# Patient Record
Sex: MALE | Race: WHITE | NOT HISPANIC OR LATINO | Employment: FULL TIME | URBAN - METROPOLITAN AREA
[De-identification: names, ages, dates, MRNs, and addresses within clinical notes are randomized per-mention and may not be internally consistent; named-entity substitution may affect disease eponyms.]

---

## 2022-08-17 ENCOUNTER — TELEPHONE (OUTPATIENT)
Dept: NEUROLOGY | Facility: HOSPITAL | Age: 72
End: 2022-08-17
Payer: COMMERCIAL

## 2022-08-17 DIAGNOSIS — D50.0 ANEMIA DUE TO CHRONIC BLOOD LOSS: Primary | ICD-10-CM

## 2022-08-17 NOTE — TELEPHONE ENCOUNTER
----- Message from Michael Silveira MD sent at 8/17/2022 11:28 AM CDT -----  Please schedule for upper DBE tomorrow. I know it's too many!

## 2022-08-17 NOTE — TELEPHONE ENCOUNTER
Attempt to give pt arrival time and instructions for upper sbe scheduled on Thursday, August 18, 2022.  Pt request to be call again in 30-45 minutes.     You are scheduled for an Upper SBE on ___Thursday, August 18, 2022 at [facility] at [address] [place] [address]  700_____________________________    You should eat light meals the day before the procedure and nothing to eat or drink after midnight the night before your procedure.    You will need to be at the 1st floor admission desk at the hospital on ___130pm arrival ____________________

## 2022-08-17 NOTE — TELEPHONE ENCOUNTER
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 Surgery is being considered by Dr. Ruiz but upper DBE is requested to evaluate for additional tumors of the small bowel prior to surgery.     Plan:   Upper DBE tomorrow with general anesthesia   Inspect stomach for history of "gastritis" which could be related to therapy for renal cell cancer     Michael Silveira MD  "

## 2022-08-18 ENCOUNTER — ANESTHESIA EVENT (OUTPATIENT)
Dept: ENDOSCOPY | Facility: HOSPITAL | Age: 72
End: 2022-08-18
Payer: COMMERCIAL

## 2022-08-18 ENCOUNTER — ANESTHESIA (OUTPATIENT)
Dept: ENDOSCOPY | Facility: HOSPITAL | Age: 72
End: 2022-08-18
Payer: COMMERCIAL

## 2022-08-18 ENCOUNTER — TELEPHONE (OUTPATIENT)
Dept: NEUROLOGY | Facility: HOSPITAL | Age: 72
End: 2022-08-18
Payer: COMMERCIAL

## 2022-08-18 ENCOUNTER — HOSPITAL ENCOUNTER (OUTPATIENT)
Facility: HOSPITAL | Age: 72
Discharge: HOME | End: 2022-08-18
Attending: INTERNAL MEDICINE | Admitting: INTERNAL MEDICINE
Payer: COMMERCIAL

## 2022-08-18 VITALS
SYSTOLIC BLOOD PRESSURE: 128 MMHG | BODY MASS INDEX: 32.32 KG/M2 | OXYGEN SATURATION: 100 % | DIASTOLIC BLOOD PRESSURE: 72 MMHG | WEIGHT: 194 LBS | RESPIRATION RATE: 14 BRPM | HEIGHT: 65 IN | HEART RATE: 66 BPM | TEMPERATURE: 98 F

## 2022-08-18 DIAGNOSIS — D64.9 ANEMIA: ICD-10-CM

## 2022-08-18 DIAGNOSIS — K92.2 GI BLEED: ICD-10-CM

## 2022-08-18 DIAGNOSIS — D50.0 ANEMIA DUE TO BLOOD LOSS, CHRONIC: ICD-10-CM

## 2022-08-18 PROCEDURE — 88341 IMHCHEM/IMCYTCHM EA ADD ANTB: CPT | Mod: 26,,, | Performed by: PATHOLOGY

## 2022-08-18 PROCEDURE — 63600175 PHARM REV CODE 636 W HCPCS: Performed by: ANESTHESIOLOGY

## 2022-08-18 PROCEDURE — 88305 TISSUE EXAM BY PATHOLOGIST: CPT | Mod: 26,,, | Performed by: PATHOLOGY

## 2022-08-18 PROCEDURE — 88342 IMHCHEM/IMCYTCHM 1ST ANTB: CPT | Mod: 59 | Performed by: PATHOLOGY

## 2022-08-18 PROCEDURE — 27201089 HC SNARE, DISP (ANY): Performed by: INTERNAL MEDICINE

## 2022-08-18 PROCEDURE — 88342 CHG IMMUNOCYTOCHEMISTRY: ICD-10-PCS | Mod: 26,,, | Performed by: PATHOLOGY

## 2022-08-18 PROCEDURE — 37000009 HC ANESTHESIA EA ADD 15 MINS: Performed by: INTERNAL MEDICINE

## 2022-08-18 PROCEDURE — 63600175 PHARM REV CODE 636 W HCPCS: Performed by: INTERNAL MEDICINE

## 2022-08-18 PROCEDURE — 25000003 PHARM REV CODE 250: Performed by: NURSE ANESTHETIST, CERTIFIED REGISTERED

## 2022-08-18 PROCEDURE — 25000003 PHARM REV CODE 250: Performed by: INTERNAL MEDICINE

## 2022-08-18 PROCEDURE — 44799 UNLISTED PX SMALL INTESTINE: CPT | Performed by: INTERNAL MEDICINE

## 2022-08-18 PROCEDURE — 63600175 PHARM REV CODE 636 W HCPCS: Performed by: NURSE ANESTHETIST, CERTIFIED REGISTERED

## 2022-08-18 PROCEDURE — 44364 SMALL BOWEL ENDOSCOPY: CPT | Mod: 59 | Performed by: INTERNAL MEDICINE

## 2022-08-18 PROCEDURE — 27201028 HC NEEDLE, SCLERO: Performed by: INTERNAL MEDICINE

## 2022-08-18 PROCEDURE — 44377 SMALL BOWEL ENDOSCOPY/BIOPSY: CPT | Performed by: INTERNAL MEDICINE

## 2022-08-18 PROCEDURE — 88305 TISSUE EXAM BY PATHOLOGIST: CPT | Mod: 59 | Performed by: PATHOLOGY

## 2022-08-18 PROCEDURE — 44366 SMALL BOWEL ENDOSCOPY: CPT | Performed by: INTERNAL MEDICINE

## 2022-08-18 PROCEDURE — 88341 PR IHC OR ICC EACH ADD'L SINGLE ANTIBODY  STAINPR: ICD-10-PCS | Mod: 26,,, | Performed by: PATHOLOGY

## 2022-08-18 PROCEDURE — 88342 IMHCHEM/IMCYTCHM 1ST ANTB: CPT | Mod: 26,,, | Performed by: PATHOLOGY

## 2022-08-18 PROCEDURE — 27201238 HC BALLOON, OVERTUBE (ANY): Performed by: INTERNAL MEDICINE

## 2022-08-18 PROCEDURE — 37000008 HC ANESTHESIA 1ST 15 MINUTES: Performed by: INTERNAL MEDICINE

## 2022-08-18 PROCEDURE — 88341 IMHCHEM/IMCYTCHM EA ADD ANTB: CPT | Performed by: PATHOLOGY

## 2022-08-18 PROCEDURE — 88305 TISSUE EXAM BY PATHOLOGIST: ICD-10-PCS | Mod: 26,,, | Performed by: PATHOLOGY

## 2022-08-18 PROCEDURE — 27200997: Performed by: INTERNAL MEDICINE

## 2022-08-18 RX ORDER — EPINEPHRINE 0.1 MG/ML
INJECTION INTRAVENOUS
Status: COMPLETED | OUTPATIENT
Start: 2022-08-18 | End: 2022-08-18

## 2022-08-18 RX ORDER — SUCCINYLCHOLINE CHLORIDE 20 MG/ML
INJECTION INTRAMUSCULAR; INTRAVENOUS
Status: DISCONTINUED | OUTPATIENT
Start: 2022-08-18 | End: 2022-08-18

## 2022-08-18 RX ORDER — SODIUM CHLORIDE 9 MG/ML
INJECTION, SOLUTION INTRAVENOUS CONTINUOUS
Status: DISCONTINUED | OUTPATIENT
Start: 2022-08-18 | End: 2022-08-18 | Stop reason: HOSPADM

## 2022-08-18 RX ORDER — ONDANSETRON 2 MG/ML
4 INJECTION INTRAMUSCULAR; INTRAVENOUS DAILY PRN
Status: DISCONTINUED | OUTPATIENT
Start: 2022-08-18 | End: 2022-08-18 | Stop reason: HOSPADM

## 2022-08-18 RX ORDER — HYDROMORPHONE HYDROCHLORIDE 2 MG/ML
0.5 INJECTION, SOLUTION INTRAMUSCULAR; INTRAVENOUS; SUBCUTANEOUS EVERY 5 MIN PRN
Status: DISCONTINUED | OUTPATIENT
Start: 2022-08-18 | End: 2022-08-18 | Stop reason: HOSPADM

## 2022-08-18 RX ORDER — LIDOCAINE HCL/PF 100 MG/5ML
SYRINGE (ML) INTRAVENOUS
Status: DISCONTINUED | OUTPATIENT
Start: 2022-08-18 | End: 2022-08-18

## 2022-08-18 RX ORDER — PHENYLEPHRINE HYDROCHLORIDE 10 MG/ML
INJECTION INTRAVENOUS
Status: DISCONTINUED | OUTPATIENT
Start: 2022-08-18 | End: 2022-08-18

## 2022-08-18 RX ORDER — FENTANYL CITRATE 50 UG/ML
INJECTION, SOLUTION INTRAMUSCULAR; INTRAVENOUS
Status: DISCONTINUED | OUTPATIENT
Start: 2022-08-18 | End: 2022-08-18

## 2022-08-18 RX ORDER — PROPOFOL 10 MG/ML
VIAL (ML) INTRAVENOUS
Status: DISCONTINUED | OUTPATIENT
Start: 2022-08-18 | End: 2022-08-18

## 2022-08-18 RX ORDER — ONDANSETRON 2 MG/ML
INJECTION INTRAMUSCULAR; INTRAVENOUS
Status: DISCONTINUED | OUTPATIENT
Start: 2022-08-18 | End: 2022-08-18

## 2022-08-18 RX ORDER — DEXTROMETHORPHAN/PSEUDOEPHED 2.5-7.5/.8
DROPS ORAL
Status: COMPLETED | OUTPATIENT
Start: 2022-08-18 | End: 2022-08-18

## 2022-08-18 RX ORDER — SODIUM CHLORIDE 0.9 % (FLUSH) 0.9 %
10 SYRINGE (ML) INJECTION
Status: DISCONTINUED | OUTPATIENT
Start: 2022-08-18 | End: 2022-08-18 | Stop reason: HOSPADM

## 2022-08-18 RX ORDER — DIPHENHYDRAMINE HYDROCHLORIDE 50 MG/ML
INJECTION INTRAMUSCULAR; INTRAVENOUS
Status: DISCONTINUED | OUTPATIENT
Start: 2022-08-18 | End: 2022-08-18

## 2022-08-18 RX ORDER — GLUCAGON 1 MG
KIT INJECTION
Status: DISCONTINUED | OUTPATIENT
Start: 2022-08-18 | End: 2022-08-18

## 2022-08-18 RX ORDER — ROCURONIUM BROMIDE 10 MG/ML
INJECTION, SOLUTION INTRAVENOUS
Status: DISCONTINUED | OUTPATIENT
Start: 2022-08-18 | End: 2022-08-18

## 2022-08-18 RX ORDER — SODIUM CHLORIDE 0.9 % (FLUSH) 0.9 %
3 SYRINGE (ML) INJECTION
Status: DISCONTINUED | OUTPATIENT
Start: 2022-08-18 | End: 2022-08-18 | Stop reason: HOSPADM

## 2022-08-18 RX ORDER — DIPHENHYDRAMINE HYDROCHLORIDE 50 MG/ML
12.5 INJECTION INTRAMUSCULAR; INTRAVENOUS EVERY 6 HOURS PRN
Status: DISCONTINUED | OUTPATIENT
Start: 2022-08-18 | End: 2022-08-18 | Stop reason: HOSPADM

## 2022-08-18 RX ADMIN — LIDOCAINE HYDROCHLORIDE 20 MG: 20 INJECTION, SOLUTION INTRAVENOUS at 01:08

## 2022-08-18 RX ADMIN — PROPOFOL 200 MG: 10 INJECTION, EMULSION INTRAVENOUS at 01:08

## 2022-08-18 RX ADMIN — ONDANSETRON 4 MG: 2 INJECTION INTRAMUSCULAR; INTRAVENOUS at 03:08

## 2022-08-18 RX ADMIN — ROCURONIUM BROMIDE 20 MG: 10 INJECTION, SOLUTION INTRAVENOUS at 01:08

## 2022-08-18 RX ADMIN — SUCCINYLCHOLINE CHLORIDE 140 MG: 20 INJECTION, SOLUTION INTRAMUSCULAR; INTRAVENOUS at 01:08

## 2022-08-18 RX ADMIN — ONDANSETRON 8 MG: 2 INJECTION, SOLUTION INTRAMUSCULAR; INTRAVENOUS at 01:08

## 2022-08-18 RX ADMIN — SUGAMMADEX 200 MG: 100 INJECTION, SOLUTION INTRAVENOUS at 02:08

## 2022-08-18 RX ADMIN — SODIUM CHLORIDE, SODIUM LACTATE, POTASSIUM CHLORIDE, AND CALCIUM CHLORIDE: .6; .31; .03; .02 INJECTION, SOLUTION INTRAVENOUS at 02:08

## 2022-08-18 RX ADMIN — SIMETHICONE 40 MG: 20 SUSPENSION/ DROPS ORAL at 01:08

## 2022-08-18 RX ADMIN — GLUCAGON HYDROCHLORIDE 0.5 MG: KIT at 02:08

## 2022-08-18 RX ADMIN — EPINEPHRINE 1 MG: 0.1 INJECTION, SOLUTION ENDOTRACHEAL; INTRACARDIAC; INTRAVENOUS at 02:08

## 2022-08-18 RX ADMIN — PHENYLEPHRINE HYDROCHLORIDE 100 MCG: 10 INJECTION INTRAVENOUS at 01:08

## 2022-08-18 RX ADMIN — DIPHENHYDRAMINE HYDROCHLORIDE 12.5 MG: 50 INJECTION INTRAMUSCULAR; INTRAVENOUS at 01:08

## 2022-08-18 RX ADMIN — ROCURONIUM BROMIDE 20 MG: 10 INJECTION, SOLUTION INTRAVENOUS at 02:08

## 2022-08-18 RX ADMIN — FENTANYL CITRATE 50 MCG: 50 INJECTION, SOLUTION INTRAMUSCULAR; INTRAVENOUS at 01:08

## 2022-08-18 RX ADMIN — ROCURONIUM BROMIDE 10 MG: 10 INJECTION, SOLUTION INTRAVENOUS at 01:08

## 2022-08-18 RX ADMIN — SODIUM CHLORIDE: 0.9 INJECTION, SOLUTION INTRAVENOUS at 12:08

## 2022-08-18 NOTE — ANESTHESIA PREPROCEDURE EVALUATION
08/18/2022  Mark Coppola is a 71 y.o., male.      Pre-op Assessment    I have reviewed the Patient Summary Reports.     I have reviewed the Nursing Notes. I have reviewed the NPO Status.   I have reviewed the Medications.     Review of Systems  Anesthesia Hx:  No problems with previous Anesthesia    Cardiovascular:   Hypertension    Pulmonary:  Pulmonary Normal    Endocrine:   Diabetes Hypothyroidism        Physical Exam  General: Well nourished, Cooperative, Alert and Oriented    Airway:  Mallampati: II   Mouth Opening: Normal  TM Distance: Normal  Tongue: Normal    Chest/Lungs:  Clear to auscultation, Normal Respiratory Rate        Anesthesia Plan  Type of Anesthesia, risks & benefits discussed:    Anesthesia Type: MAC  Intra-op Monitoring Plan: Standard ASA Monitors  Post Op Pain Control Plan: multimodal analgesia  Induction:  IV  Informed Consent: Informed consent signed with the Patient and all parties understand the risks and agree with anesthesia plan.  All questions answered.   ASA Score: 3    Ready For Surgery From Anesthesia Perspective.     .

## 2022-08-18 NOTE — OR NURSING
Discharge instructions reviewed with patient and spouse. Copy of procedural results given to patient.   Patient sitting up side of bed at his request  Prior to getting dress       Dr Silveira notifiy patient phone number

## 2022-08-18 NOTE — PLAN OF CARE
Patient has met PACU discharge criteria, VSS, pain well controlled. Family updated by phone. Released from PACU by Dr. Mendez

## 2022-08-18 NOTE — H&P
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 Surgery is being considered by Dr. Ruiz but upper DBE is requested to evaluate for additional tumors of the small bowel prior to surgery.     Past Medical History  Renal cell CA - advanced on diagnosis in 2016; initially treated with right nephrectomy; complicated by metastatic disease to the perineum and now duodenum   History of FE   Hypertension   Gout   History of prostate CA s/p prostatectomy   History of Hashimoto's thyroiditis s/p thyroidectomy      Physical Examination  /71 (BP Location: Left arm, Patient Position: Lying)   Pulse 76   Temp 98.4 °F (36.9 °C) (Tympanic)   Resp 16   Ht 5' 5" (1.651 m)   Wt 88 kg (194 lb)   SpO2 98%   BMI 32.28 kg/m²     General appearance: alert, cooperative, no distress  HENT: Normocephalic, atraumatic, neck symmetrical, no nasal discharge   Lungs: clear to auscultation bilaterally, no dullness to percussion bilaterally  Heart: regular rate and rhythm without rub; no displacement of the PMI   Abdomen: soft, non-tender; bowel sounds normoactive; no organomegaly    Assessment:   71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 Surgery is being considered by Dr. Ruiz but upper DBE is requested to evaluate for additional tumors of the small bowel prior to surgery as well as potential attempt at tumor resection if feasible.     Plan:  Upper DBE with general anesthesia  Potential attempt at endoscopic resection or debulking of the duodenal tumor, especially if there are no additional tumors found in the deep small bowel. Risks/benefits of endoscopic intervention discussed with patient and wife in detail including " risks of perforation and bleeding which are much higher than standard endoscopy. As major surgery such as Whipple is the other option for therapy, patient wishes to plan for endoscopic resection if feasible.     Michael Silveira MD   [address] [facility] [facility], [address]   [place] [address] [phone]

## 2022-08-18 NOTE — PLAN OF CARE
Recovery time completed.IV discontinued at this time.All questions encouraged and answered at this time.

## 2022-08-18 NOTE — TELEPHONE ENCOUNTER
Pt given 1230pm arrival today, [facility] at [address], [place]. [address].  Pt states he is npo.

## 2022-08-18 NOTE — ANESTHESIA POSTPROCEDURE EVALUATION
Anesthesia Post Evaluation    Patient: Mark Coppola    Procedure(s) Performed: Procedure(s) (LRB):  Upper DBE with general anesthesia (N/A)    Final Anesthesia Type: MAC      Patient location during evaluation: PACU  Patient participation: Yes- Able to Participate  Level of consciousness: awake and alert, oriented and awake  Post-procedure vital signs: reviewed and stable  Pain management: adequate  Airway patency: patent    PONV status at discharge: No PONV  Anesthetic complications: no      Cardiovascular status: blood pressure returned to baseline  Respiratory status: unassisted and room air  Hydration status: euvolemic  Follow-up not needed.          Vitals Value Taken Time   /72 08/18/22 1610   Temp 36.8 °C (98.2 °F) 08/18/22 1543   Pulse 66 08/18/22 1610   Resp 14 08/18/22 1610   SpO2 100 % 08/18/22 1610         Event Time   Out of Recovery 16:29:47         Pain/Yg Score: Yg Score: 10 (8/18/2022  4:10 PM)

## 2022-08-18 NOTE — PLAN OF CARE
Admission process complete. Patient ready for procedure. Plan of care reviewed with patient. VSS. NPO status maintained. Call light in reach. Bed locked and in lowest position.      DC instructions

## 2022-08-18 NOTE — TRANSFER OF CARE
"Anesthesia Transfer of Care Note    Patient: Mark Coppola    Procedure(s) Performed: Procedure(s) (LRB):  Upper DBE with general anesthesia (N/A)    Patient location: PACU    Anesthesia Type: general    Transport from OR: Transported from OR on 2-3 L/min O2 by NC with adequate spontaneous ventilation    Post pain: adequate analgesia    Post assessment: no apparent anesthetic complications and tolerated procedure well    Post vital signs: stable    Level of consciousness: sedated    Nausea/Vomiting: no nausea/vomiting    Complications: none    Transfer of care protocol was followed      Last vitals:   Visit Vitals  /71   Pulse 80   Temp 36.5 °C (97.7 °F) (Skin)   Resp 18   Ht 5' 5" (1.651 m)   Wt 88 kg (194 lb)   SpO2 100%   BMI 32.28 kg/m²     "

## 2022-08-18 NOTE — PLAN OF CARE
Pt procedure completed with no complications.V/S are stable. Pt denies any pain or discomfort at this time.Wife at the bedside.Voices no complaints or concerns.Will continue to monitor.

## 2022-08-18 NOTE — [employer]ATION PATIENT INSTRUCTIONS
Discharge Summary/Instructions after an Endoscopic Procedure  Patient Name: Mark Coppola  Patient MRN: [id number]  Patient YOB: 1950  Thursday, August 18, 2022  Michael Silveira MD  Dear patient,  As a result of recent federal legislation (The Federal Cures Act), you may   receive lab or pathology results from your procedure in your [facility]   account before your physician is able to contact you. Your physician or   their representative will relay the results to you with their   recommendations at their soonest availability.  Thank you,  Your health is very important to us during the Covid Crisis. Following your   procedure today, you will receive a daily text for 2 weeks asking about   signs or symptoms of Covid 19.  Please respond to this text when you   receive it so we can follow up and keep you as safe as possible.   RESTRICTIONS:  During your procedure today, you received medications for sedation.  These   medications may affect your judgment, balance and coordination.  Therefore,   for 24 hours, you have the following restrictions:   - DO NOT drive a car, operate machinery, make legal/financial decisions,   sign important papers or drink alcohol.    ACTIVITY:  Today: no heavy lifting, straining or running due to procedural   sedation/anesthesia.  The following day: return to full activity including work.  DIET:  Eat and drink normally unless instructed otherwise.     TREATMENT FOR COMMON SIDE EFFECTS:  - Mild abdominal pain, nausea, belching, bloating or excessive gas:  rest,   eat lightly and use a heating pad.  - Sore Throat: treat with throat lozenges and/or gargle with warm salt   water.  - Because air was used during the procedure, expelling large amounts of air   from your rectum or belching is normal.  - If a bowel prep was taken, you may not have a bowel movement for 1-3 days.    This is normal.  SYMPTOMS TO WATCH FOR AND REPORT TO YOUR PHYSICIAN:  1. Abdominal pain or bloating, other  than gas cramps.  2. Chest pain.  3. Back pain.  4. Signs of infection such as: chills or fever occurring within 24 hours   after the procedure.  5. Rectal bleeding, which would show as bright red, maroon, or black stools.   (A tablespoon of blood from the rectum is not serious, especially if   hemorrhoids are present.)  6. Vomiting.  7. Weakness or dizziness.  GO DIRECTLY TO THE NEAREST EMERGENCY ROOM IF YOU HAVE ANY OF THE FOLLOWING:      Difficulty breathing              Chills and/or fever over 101 F   Persistent vomiting and/or vomiting blood   Severe abdominal pain   Severe chest pain   Black, tarry stools   Bleeding- more than one tablespoon   Any other symptom or condition that you feel may need urgent attention  Your doctor recommends these additional instructions:  If any biopsies were taken, your doctors clinic will contact you in 1 to 2   weeks with any results.  - Discharge patient to home.   - Condition stable   - The signs and symptoms of potential delayed complications were discussed   with the patient. If signs or symptoms of these complications develop, call   the [facility] at [phone].   - Return to normal activities tomorrow.  Written discharge instructions were   provided to the patient.  For questions, problems or results please call your physician - Michael Silveira MD.  EMERGENCY PHONE NUMBER: [phone],  LAB RESULTS: [phone]  IF A COMPLICATION OR EMERGENCY SITUATION ARISES AND YOU ARE UNABLE TO REACH   YOUR PHYSICIAN - GO DIRECTLY TO THE EMERGENCY ROOM.  MD Michael Davis MD  8/18/2022 3:01:38 PM  This report has been verified and signed electronically.  Dear patient,  As a result of recent federal legislation (The Federal Cures Act), you may   receive lab or pathology results from your procedure in your [facility]   account before your physician is able to contact you. Your physician or   their representative will relay the results to you with  their   recommendations at their soonest availability.  Thank you,  PROVATION

## 2022-08-18 NOTE — ANESTHESIA PROCEDURE NOTES
Intubation    Date/Time: 8/18/2022 1:33 PM  Performed by: Candice Payne CRNA  Authorized by: Candice Payne CRNA     Intubation:     Induction:  Rapid sequence induction    Intubated:  Postinduction    Mask Ventilation:  Easy mask    Attempts:  1    Attempted By:  CRNA    Method of Intubation:  Video laryngoscopy    Blade:  Antonio 3    Laryngeal View Grade: Grade I - full view of cords      Difficult Airway Encountered?: No      Complications:  None    Airway Device:  Oral endotracheal tube    Airway Device Size:  7.5    Tube secured:  21    Secured at:  The lips    Placement Verified By:  Capnometry    Complicating Factors:  None    Findings Post-Intubation:  BS equal bilateral and atraumatic/condition of teeth unchanged

## 2022-08-19 LAB — POCT GLUCOSE: 111 MG/DL (ref 70–110)

## 2022-08-25 ENCOUNTER — TELEPHONE (OUTPATIENT)
Dept: NEUROLOGY | Facility: HOSPITAL | Age: 72
End: 2022-08-25
Payer: COMMERCIAL

## 2022-08-25 LAB
COMMENT: NORMAL
FINAL PATHOLOGIC DIAGNOSIS: NORMAL
GROSS: NORMAL
Lab: NORMAL
MICROSCOPIC EXAM: NORMAL

## 2022-08-25 NOTE — TELEPHONE ENCOUNTER
----- Message from Michael Silveira MD sent at 8/25/2022 11:35 AM CDT -----  Please fax this patient's procedure report to Dr. Ruiz at his new office.   Please schedule a clinic visit for Dr. Coppola to see me sometime in September just to review his results in detail and pictures and review plans for future management

## 2022-08-25 NOTE — TELEPHONE ENCOUNTER
Clinic appt schedule with pt on Wednesday, September 14, 2022 at 11am.  Pt given clinic address and repeated correctly.  Procedure report faxed to Dr. Ruiz at [phone].

## 2022-09-13 ENCOUNTER — TELEPHONE (OUTPATIENT)
Dept: NEUROLOGY | Facility: HOSPITAL | Age: 72
End: 2022-09-13
Payer: COMMERCIAL

## 2022-09-13 NOTE — TELEPHONE ENCOUNTER
----- Message from Geena Oscar sent at 9/13/2022  1:03 PM CDT -----  Contact: [phone]  Who Called: PT  Regarding: change appt time to later in the day   Would the patient rather a call back or a response via [employer]? Call back  Best Call Back Number: [phone]  Additional Information: n/a

## 2022-09-19 ENCOUNTER — OFFICE VISIT (OUTPATIENT)
Dept: NEUROLOGY | Facility: HOSPITAL | Age: 72
End: 2022-09-19
Attending: INTERNAL MEDICINE
Payer: COMMERCIAL

## 2022-09-19 VITALS
HEIGHT: 65 IN | DIASTOLIC BLOOD PRESSURE: 79 MMHG | TEMPERATURE: 98 F | HEART RATE: 62 BPM | SYSTOLIC BLOOD PRESSURE: 152 MMHG | WEIGHT: 194 LBS | BODY MASS INDEX: 32.32 KG/M2

## 2022-09-19 DIAGNOSIS — C78.4 SECONDARY MALIGNANCY OF DUODENUM: Primary | ICD-10-CM

## 2022-09-19 PROCEDURE — 99213 OFFICE O/P EST LOW 20 MIN: CPT | Performed by: INTERNAL MEDICINE

## 2022-09-19 RX ORDER — APREMILAST 30 MG/1
30 TABLET, FILM COATED ORAL 2 TIMES DAILY
Status: ON HOLD | COMMUNITY
End: 2023-11-17 | Stop reason: CLARIF

## 2022-09-19 NOTE — PROGRESS NOTES
"[facility]    CC: GI blood loss    HPI 71 y.o. male with a history of metastatic renal cell cancer presenting for follow up of DBE for evaluation of iron deficiency anemia secondary to chronic GI blood loss associated with recently discovered renal cell metastasis to the duodenum. 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 He denies any fatigue, shortness of breath, chest pain, or weakness. He denies any dark stool, melena, or hematochezia. He reports his last iron infusion was on 9/7/2022.    Upper DBE 8/18  - 10 mm polyp around ampulla  - 25 mm pedunculated polyp with actively bleeding in 2nd portion of duodenum  - patchy mucosa in jejunum     Past Medical History  Renal Cell Carcinoma (2016) s/p right nephrectomy now with metastasis to perineum and duodenum  FE  HTN  Gout  History of prostate cancer s/p porstatectomy  Hashimoto's Thyroiditis s/p Thyroidectomy      Physical Examination  BP (!) 152/79 (BP Location: Right arm, Patient Position: Sitting, BP Method: Large (Automatic))   Pulse 62   Temp 97.8 °F (36.6 °C) (Oral)   Ht 5' 5" (1.651 m)   Wt 88 kg (194 lb 0.1 oz)   BMI 32.28 kg/m²   General appearance: alert, cooperative, no distress  HENT: Normocephalic, atraumatic, neck symmetrical, no nasal discharge   Lungs: clear to auscultation bilaterally, no dullness to percussion bilaterally  Heart: regular rate and rhythm without rub; no displacement of the PMI   Abdomen: soft, non-tender; bowel sounds normoactive; no organomegaly  Extremities: extremities symmetric; no clubbing, cyanosis, or edema  Neurologic: Alert and conversant    Labs:  Hb = 9.4 I MCV = 87    Assessment:   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 This intervention has successfully improved his GI " blood loss. However, he has another metastatic lesion involving his ampulla which could be proactively removed by ampulectomy for debulking to prevent future complications such as bleeding     Plan:  Case referred to Dr. Yifan Partida with [facility] for ampulectomy for debulking of ampullary renal cell metastasis   Plan future sessions of debulking for recurrent renal cell tumor grown in the small bowel by DBE as needed     Michael Silveira MD   [address] [facility], [address]   [place] [address] [phone]

## 2023-04-06 DIAGNOSIS — R26.9 ABNORMALITY OF GAIT: ICD-10-CM

## 2023-04-06 DIAGNOSIS — I51.9 MYXEDEMA HEART DISEASE: ICD-10-CM

## 2023-04-06 DIAGNOSIS — I10 ESSENTIAL HYPERTENSION, MALIGNANT: ICD-10-CM

## 2023-04-06 DIAGNOSIS — E78.5 HYPERLIPEMIA: ICD-10-CM

## 2023-04-06 DIAGNOSIS — E03.9 MYXEDEMA HEART DISEASE: ICD-10-CM

## 2023-04-06 DIAGNOSIS — R53.83 FATIGUE: ICD-10-CM

## 2023-04-06 DIAGNOSIS — D64.9 ANEMIA, UNSPECIFIED: ICD-10-CM

## 2023-04-06 DIAGNOSIS — D51.9 VITAMIN B12 DEFICIENCY ANEMIA: ICD-10-CM

## 2023-10-02 ENCOUNTER — HOSPITAL ENCOUNTER (OUTPATIENT)
Dept: RADIATION THERAPY | Facility: HOSPITAL | Age: 73
Discharge: HOME | End: 2023-10-02
Attending: RADIOLOGY
Payer: COMMERCIAL

## 2023-10-05 ENCOUNTER — OFFICE VISIT (OUTPATIENT)
Dept: HEMATOLOGY/ONCOLOGY | Facility: CLINIC | Age: 73
End: 2023-10-05
Payer: COMMERCIAL

## 2023-10-05 VITALS
DIASTOLIC BLOOD PRESSURE: 65 MMHG | HEART RATE: 76 BPM | HEIGHT: 65 IN | BODY MASS INDEX: 32.28 KG/M2 | RESPIRATION RATE: 17 BRPM | SYSTOLIC BLOOD PRESSURE: 142 MMHG | TEMPERATURE: 97 F | OXYGEN SATURATION: 97 %

## 2023-10-05 DIAGNOSIS — C79.31 METASTATIC RENAL CELL CARCINOMA TO BRAIN: Primary | ICD-10-CM

## 2023-10-05 DIAGNOSIS — D64.9 SYMPTOMATIC ANEMIA: ICD-10-CM

## 2023-10-05 DIAGNOSIS — C79.31 METASTATIC CANCER TO BRAIN: Primary | ICD-10-CM

## 2023-10-05 DIAGNOSIS — C64.9 METASTATIC RENAL CELL CARCINOMA TO BRAIN: Primary | ICD-10-CM

## 2023-10-05 DIAGNOSIS — C79.31 METASTATIC RENAL CELL CARCINOMA TO BRAIN: ICD-10-CM

## 2023-10-05 DIAGNOSIS — C64.9 METASTATIC RENAL CELL CARCINOMA TO BRAIN: ICD-10-CM

## 2023-10-05 DIAGNOSIS — C64.9 METASTATIC RENAL CELL CARCINOMA, UNSPECIFIED LATERALITY: ICD-10-CM

## 2023-10-05 PROCEDURE — 3288F PR FALLS RISK ASSESSMENT DOCUMENTED: ICD-10-PCS | Mod: CPTII,S$GLB,, | Performed by: INTERNAL MEDICINE

## 2023-10-05 PROCEDURE — 99205 OFFICE O/P NEW HI 60 MIN: CPT | Mod: S$GLB,,, | Performed by: INTERNAL MEDICINE

## 2023-10-05 PROCEDURE — 1126F AMNT PAIN NOTED NONE PRSNT: CPT | Mod: CPTII,S$GLB,, | Performed by: INTERNAL MEDICINE

## 2023-10-05 PROCEDURE — 3078F PR MOST RECENT DIASTOLIC BLOOD PRESSURE < 80 MM HG: ICD-10-PCS | Mod: CPTII,S$GLB,, | Performed by: INTERNAL MEDICINE

## 2023-10-05 PROCEDURE — 99205 PR OFFICE/OUTPT VISIT, NEW, LEVL V, 60-74 MIN: ICD-10-PCS | Mod: S$GLB,,, | Performed by: INTERNAL MEDICINE

## 2023-10-05 PROCEDURE — 99999 PR PBB SHADOW E&M-EST. PATIENT-LVL V: CPT | Mod: PBBFAC,,, | Performed by: INTERNAL MEDICINE

## 2023-10-05 PROCEDURE — 3077F PR MOST RECENT SYSTOLIC BLOOD PRESSURE >= 140 MM HG: ICD-10-PCS | Mod: CPTII,S$GLB,, | Performed by: INTERNAL MEDICINE

## 2023-10-05 PROCEDURE — 1159F MED LIST DOCD IN RCRD: CPT | Mod: CPTII,S$GLB,, | Performed by: INTERNAL MEDICINE

## 2023-10-05 PROCEDURE — 1159F PR MEDICATION LIST DOCUMENTED IN MEDICAL RECORD: ICD-10-PCS | Mod: CPTII,S$GLB,, | Performed by: INTERNAL MEDICINE

## 2023-10-05 PROCEDURE — 1160F RVW MEDS BY RX/DR IN RCRD: CPT | Mod: CPTII,S$GLB,, | Performed by: INTERNAL MEDICINE

## 2023-10-05 PROCEDURE — 1126F PR PAIN SEVERITY QUANTIFIED, NO PAIN PRESENT: ICD-10-PCS | Mod: CPTII,S$GLB,, | Performed by: INTERNAL MEDICINE

## 2023-10-05 PROCEDURE — 3008F BODY MASS INDEX DOCD: CPT | Mod: CPTII,S$GLB,, | Performed by: INTERNAL MEDICINE

## 2023-10-05 PROCEDURE — 99999 PR PBB SHADOW E&M-EST. PATIENT-LVL V: ICD-10-PCS | Mod: PBBFAC,,, | Performed by: INTERNAL MEDICINE

## 2023-10-05 PROCEDURE — 3078F DIAST BP <80 MM HG: CPT | Mod: CPTII,S$GLB,, | Performed by: INTERNAL MEDICINE

## 2023-10-05 PROCEDURE — 1101F PR PT FALLS ASSESS DOC 0-1 FALLS W/OUT INJ PAST YR: ICD-10-PCS | Mod: CPTII,S$GLB,, | Performed by: INTERNAL MEDICINE

## 2023-10-05 PROCEDURE — 3008F PR BODY MASS INDEX (BMI) DOCUMENTED: ICD-10-PCS | Mod: CPTII,S$GLB,, | Performed by: INTERNAL MEDICINE

## 2023-10-05 PROCEDURE — 3288F FALL RISK ASSESSMENT DOCD: CPT | Mod: CPTII,S$GLB,, | Performed by: INTERNAL MEDICINE

## 2023-10-05 PROCEDURE — 3077F SYST BP >= 140 MM HG: CPT | Mod: CPTII,S$GLB,, | Performed by: INTERNAL MEDICINE

## 2023-10-05 PROCEDURE — 1160F PR REVIEW ALL MEDS BY PRESCRIBER/CLIN PHARMACIST DOCUMENTED: ICD-10-PCS | Mod: CPTII,S$GLB,, | Performed by: INTERNAL MEDICINE

## 2023-10-05 PROCEDURE — 1101F PT FALLS ASSESS-DOCD LE1/YR: CPT | Mod: CPTII,S$GLB,, | Performed by: INTERNAL MEDICINE

## 2023-10-05 NOTE — PROGRESS NOTES
Subjective     Patient ID: Mark Coppola is a 72 y.o. male.    Chief Complaint: No chief complaint on file.    HPI      - 7/1/2016 Left nephrectomy  Pathology:  Clear cell RCC, Grade 3, pT3, negative margins    - 10/2/2018 recurrence on surveillance imaging at right paratracheal LN  Tried Sutent x 1-2 months but discontinued with side effects    - end of 2018- 2/2020: Ipi/Nivo followed by Nivo single agent  Developed lichen planus and discontinued    - 3/19/2020 Bone scan negative  - 5/8/2020 Brain MRI negative  - 5/8/2020 CT C/A/P revealed a 1.5 cm LLL pleural nodule  - 8/19/2020 CT C/A/P revealed the 1.5 cm LLL pleural nodule and a 0.5 cm LLL noduke    - 2/26/2021 CT C/A/P: revealed increased increased pleural and pulmonary nodules  - 5/18/2021 CT C/A/P Sacroiliac lytic lesion, other areas stable    - XRT    - GI Bleed  Intervention 9/2022    PMH:  - Prostate Cancer  2002, attempted radical prostatectomy aborted but LND completed  Karval 8 prostate cancer  Completed proton beam XRT and ADT  Radiation proctitis s/p APC 2020    -HTN  - Type 2 DM  - Hyperlipidemia  - Hypothyroidism (Hashimoto's-> thyroidectomy)  - Neuropathy  - Diverticulosis  - Fe def anemia    Review of Systems       Objective     Physical Exam       Assessment and Plan     {There are no diagnoses linked to this encounter. (Refresh or delete this SmartLink)}    ***         No follow-ups on file.

## 2023-10-05 NOTE — PROGRESS NOTES
Subjective     Patient ID: Mark Coppola is a 72 y.o. male.    Chief Complaint: Prostate Cancer    HPI    Presents for medical oncology opinion:  Metastatic renal cell cancer  CARIS- [facility]  Currently off therapy- multiple transfusions with disease related bleed    Primary Medical Oncologist: Dr. Gael Corado  Primary Radiation Oncologist: Dr. Baylee Hinson    Reports the following:  - stereotactic XRT CNS approx 6 months ago and told most recent MRI - Friday MRI  Left cerebellar, right cerebellar, right frontal progression    6 weeks ago stopped working- rhinovirus  Got very ill    Chronic nausea  No headaches  Undergoing PT-- generalized weakness  LE swelling  Weight decreased over last 2-3 years -- eating better with steroid and now having blood glucose elevations (> 200)  Treating hypercalcemia    Prior consults at [facility] [place], [facility]    Oncology History:    [facility]  [place] like he would die climbing there and when he returned home and sought out work up  Developed hematuria and hydronephrosis    - 7/1/2016 Left nephrectomy at [facility]  Pathology:  Clear cell RCC, Grade 3, pT3, negative margins    - surveillance imaging    - 10/2/2018 recurrence on surveillance imaging at right paratracheal LN  Tried Sutent x 1-2 months but discontinued with side effects (hand foot symptoms)    - end of 2018- 2/2020: Ipi/Nivo followed by Nivo single agent  Plaquenil  Otezela  Developed lichen planus and discontinued    - 3/19/2020 Bone scan negative    - 5/8/2020 Brain MRI negative    - 5/8/2020 CT C/A/P revealed a 1.5 cm LLL pleural nodule    - 8/19/2020 CT C/A/P revealed the 1.5 cm LLL pleural nodule and a 0.5 cm LLL noduke    - 2/26/2021 CT C/A/P: revealed increased increased pleural and pulmonary nodules    - 5/18/2021 CT C/A/P Sacroiliac lytic lesion, other areas stable    - he reports on Keytruda at same point    - XRT to SI joint    6 weeks ago rhinovirus  -dexamethasone 4 mg bid  Xgeva (touro) to  reduce calcium Dr. Sotelo    Last Keytruda dose  Told to discontinue      - GI Bleed  Intervention 2022- cauterization   Intermittently requires transfusion - last transfusion was last Saturday  Can be 1 x per week  Recent scopes - Dr. 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 Negative for chills and fever.   HENT:  Negative for trouble swallowing.    Eyes:  Negative for visual disturbance.   Respiratory:  Negative for cough and shortness of breath.    Cardiovascular:  Positive for leg swelling. Negative for chest pain and palpitations.   Gastrointestinal:  Negative for abdominal distention, abdominal pain, change in bowel habit, constipation and nausea.   Genitourinary:  Negative for decreased urine volume, difficulty urinating and dysuria.   Musculoskeletal:  Negative for arthralgias.   Neurological:  Negative for dizziness, weakness, light-headedness and numbness.   Hematological:  Negative for adenopathy. Does not bruise/bleed easily.   Psychiatric/Behavioral:  Negative for dysphoric mood. The patient is not nervous/anxious.           Objective     Physical Exam  Constitutional:       General: He is not in acute distress.     Appearance: He is ill-appearing.      Comments: Presents with son  ECOG= 2   HENT:      Head: Normocephalic and atraumatic.   Eyes:      General: No scleral icterus.     Extraocular Movements: Extraocular movements intact.      Pupils: Pupils are  equal, round, and reactive to light.   Cardiovascular:      Rate and Rhythm: Normal rate and regular rhythm.      Heart sounds: Murmur heard.      No friction rub. No gallop.   Pulmonary:      Effort: Pulmonary effort is normal. No respiratory distress.      Breath sounds: Normal breath sounds. No wheezing, rhonchi or rales.   Abdominal:      General: Abdomen is flat. Bowel sounds are normal. There is no distension.      Palpations: Abdomen is soft. There is no mass.      Tenderness: There is no abdominal tenderness. There is no rebound.   Musculoskeletal:         General: No swelling. Normal range of motion.      Cervical back: Normal range of motion and neck supple.      Right lower leg: No edema.      Left lower leg: No edema.   Skin:     General: Skin is warm and dry.      Coloration: Skin is pale. Skin is not jaundiced.      Findings: No rash.   Neurological:      General: No focal deficit present.      Mental Status: He is alert and oriented to person, place, and time.      Sensory: No sensory deficit.      Motor: Weakness (generalized) present.      Gait: Gait abnormal.   Psychiatric:         Mood and Affect: Mood normal.         Behavior: Behavior normal.         Thought Content: Thought content normal.         Judgment: Judgment normal.     Outside available recrrds reviewed     Assessment and Plan     1. Metastatic cancer to brain  -     Ambulatory referral/consult to Radiation Oncology; Future; Expected date: 10/13/2023    2. Metastatic renal cell carcinoma, unspecified laterality    3. Symptomatic anemia    4. Metastatic renal cell carcinoma to brain        Dr. Donn Velazquez referral asap with progression in brain    NGS testing    RTC post to see what systemic option remain- concerns with performance status and bleed    Route Chart for Scheduling    Med Onc Chart Routing  Urgent    Follow up with physician . See Dr. Donn Velazquez next week; RTC 2 weeks cbc, cmp and EP with me- needs 1 hour slot and on a  Tuesday   Follow up with [facility]    Infusion scheduling note    Injection scheduling note    Labs    Imaging    Pharmacy appointment    Other referrals

## 2023-10-13 ENCOUNTER — TELEPHONE (OUTPATIENT)
Dept: HEMATOLOGY/ONCOLOGY | Facility: CLINIC | Age: 73
End: 2023-10-13
Payer: COMMERCIAL

## 2023-10-13 PROBLEM — C79.31 METASTATIC RENAL CELL CARCINOMA TO BRAIN: Status: ACTIVE | Noted: 2023-10-13

## 2023-10-13 PROBLEM — C64.9 METASTATIC RENAL CELL CARCINOMA TO BRAIN: Status: ACTIVE | Noted: 2023-10-13

## 2023-10-13 PROBLEM — D64.9 SYMPTOMATIC ANEMIA: Status: ACTIVE | Noted: 2023-10-13

## 2023-10-13 PROBLEM — C64.9 METASTATIC RENAL CELL CARCINOMA: Status: ACTIVE | Noted: 2023-10-13

## 2023-10-13 NOTE — TELEPHONE ENCOUNTER
----- Message from Veronica Acevedo sent at 10/13/2023  2:16 PM CDT -----  Contact: son  Type:  Needs Medical Advice    Who Called: pt's son jr.   Would the patient rather a call back or a response via [facility]? call  Best Call Back Number: [phone]   Additional Information:   Son requesting a call regarding what is the next steps in for his for care

## 2023-10-17 DIAGNOSIS — E11.65 TYPE 2 DIABETES MELLITUS WITH HYPERGLYCEMIA, UNSPECIFIED WHETHER LONG TERM INSULIN USE: Primary | ICD-10-CM

## 2023-10-17 RX ORDER — LANCING DEVICE
1 EACH MISCELLANEOUS 2 TIMES DAILY WITH MEALS
Qty: 1 EACH | Refills: 0 | Status: ON HOLD | OUTPATIENT
Start: 2023-10-17 | End: 2023-11-17 | Stop reason: CLARIF

## 2023-10-17 RX ORDER — INSULIN PUMP SYRINGE, 3 ML
EACH MISCELLANEOUS
Qty: 1 EACH | Refills: 0 | Status: ON HOLD | OUTPATIENT
Start: 2023-10-17 | End: 2023-11-17 | Stop reason: CLARIF

## 2023-10-17 RX ORDER — PEN NEEDLE, DIABETIC 30 GX3/16"
1 NEEDLE, DISPOSABLE MISCELLANEOUS 2 TIMES DAILY WITH MEALS
Qty: 100 EACH | Refills: 11 | Status: ON HOLD | OUTPATIENT
Start: 2023-10-17 | End: 2023-11-17 | Stop reason: CLARIF

## 2023-10-17 RX ORDER — INSULIN ASPART 100 [IU]/ML
INJECTION, SOLUTION INTRAVENOUS; SUBCUTANEOUS
Status: ON HOLD
Start: 2023-10-17 | End: 2023-11-17 | Stop reason: CLARIF

## 2023-10-17 RX ORDER — INSULIN ASPART 100 [IU]/ML
5 INJECTION, SOLUTION INTRAVENOUS; SUBCUTANEOUS
Qty: 13.5 ML | Refills: 0 | Status: ON HOLD | OUTPATIENT
Start: 2023-10-17 | End: 2023-11-17 | Stop reason: CLARIF

## 2023-10-17 RX ORDER — PEN NEEDLE, DIABETIC 29 G X1/2"
1 NEEDLE, DISPOSABLE MISCELLANEOUS 2 TIMES DAILY WITH MEALS
Qty: 100 EACH | Refills: 11 | Status: ON HOLD | OUTPATIENT
Start: 2023-10-17 | End: 2023-11-17 | Stop reason: CLARIF

## 2023-10-17 RX ORDER — LANCETS
1 EACH MISCELLANEOUS 2 TIMES DAILY WITH MEALS
Qty: 100 EACH | Refills: 11 | Status: ON HOLD | OUTPATIENT
Start: 2023-10-17 | End: 2023-11-17 | Stop reason: CLARIF

## 2023-10-17 NOTE — PROGRESS NOTES
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  He was recently started on steroids for his brain tumor.  He does not want to go to the ER.  Will order insulin and diabetic supplies.  Will increase Metformin to 1g BID.   He requests to also have a new home health agency.  Orders done.  CM/SW informed of new orders.    Adele Morales MD, [facility]  Senior Physician  [facility]

## 2023-10-17 NOTE — PROGRESS NOTES
[facility]  [address]  [facility] [address]  Phone: [phone]    Home Health Orders  Face to Face Encounter    Patient Name: Mark Coppola  YOB: 1950    PCP: IVAN Corado MD   PCP Address: [address] / [facility] [address]  PCP Phone Number: [phone]  PCP Fax: [phone]    Encounter Date: 10/17/2023    Admit To Home Health    Diagnoses:  There are no hospital problems to display for this patient.      Follow Up Appointments:  Future Appointments   Date Time Provider Department Center   10/18/2023  2:00 PM Donn Velazquez MD [facility]           Allergies:  Review of patient's allergies indicates:   Allergen Reactions    Iodinated contrast media Rash         Medications: Review discharge medications with patient and family and provide education.      Cannot display discharge medications since this is not an admission.        I have seen and examined this patient within the last 30 days. My clinical findings that support the need for the home health skilled services and home bound status are the following:no   Weakness/numbness causing balance and gait disturbance due to Malignancy/Cancer making it taxing to leave home.       Code Status:    Code Status: Prior      Nursing:   Agency to admit patient within 24 hours of hospital discharge unless specified on physician order or at patient request    SN to complete comprehensive assessment including routine vital signs. Instruct on disease process and s/s of complications to report to MD. Review/verify medication list sent home with the patient at time of discharge  and instruct patient/caregiver as needed. Frequency may be adjusted depending on start of care date.     Skilled nurse to perform up to 3 visits PRN for symptoms related to diagnosis    Notify MD if SBP > 160 or < 90; DBP > 90 or < 50; HR > 120 or < 50; Temp > 101; O2 < 88%    Ok to schedule additional visits based  on staff availability and patient request on consecutive days within the home health episode.      When multiple disciplines ordered:    Start of Care occurs on Sunday - Wednesday schedule remaining discipline evaluations as ordered on separate consecutive days following the start of care.    Thursday SOC -schedule subsequent evaluations Friday and Monday the following week.     Friday - Saturday SOC - schedule subsequent discipline evaluations on consecutive days starting Monday of the following week.      Diet:   diabetic diet 1800 calorie      Labs:  SN to perform labs:  CBC: Weekly; 2 week(s) and CMP: Weekly; 2 week(s) and Report Lab results to PCP.      Activities:   activity as tolerated      Diabetes Care:       Check blood sugar:        Fingerstick blood sugar AC and HS    Fingerstick blood sugar every 6 hours if unable to eat       Report CBG < 60 or > 400 to physician.                                          Insulin Sliding Scale          Glucose  Novolog Insulin Subcutaneous        0 - 60   Orange juice or glucose tablet, hold insulin      No insulin   201-250  2 units   251-300  4 units   301-350  6 units   351-400  8 units   >400   10 units then call physician      For all post-discharge communication and subsequent orders please contact patient's primary care physician. If unable to reach primary care physician or do not receive response within 30 minutes, please contact [facility] on call for clinical staff order clarification      I certify that this patient is confined to his home and needs intermittent skilled nursing care, physical therapy and occupational therapy.      Adele Morales MD  [facility]

## 2023-10-19 ENCOUNTER — OFFICE VISIT (OUTPATIENT)
Dept: RADIATION ONCOLOGY | Facility: CLINIC | Age: 73
End: 2023-10-19
Payer: COMMERCIAL

## 2023-10-19 DIAGNOSIS — C64.9 METASTATIC RENAL CELL CARCINOMA TO BRAIN: Primary | ICD-10-CM

## 2023-10-19 DIAGNOSIS — C79.31 METASTATIC RENAL CELL CARCINOMA TO BRAIN: Primary | ICD-10-CM

## 2023-10-19 DIAGNOSIS — C79.31 METASTATIC CANCER TO BRAIN: ICD-10-CM
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
## 2023-10-19 DIAGNOSIS — I67.89 RADIATION THERAPY INDUCED BRAIN NECROSIS: ICD-10-CM

## 2023-10-19 PROCEDURE — 99204 OFFICE O/P NEW MOD 45 MIN: CPT | Mod: 95,,, | Performed by: RADIOLOGY

## 2023-10-19 PROCEDURE — 99204 PR OFFICE/OUTPT VISIT, NEW, LEVL IV, 45-59 MIN: ICD-10-PCS | Mod: 95,,, | Performed by: RADIOLOGY

## 2023-10-19 NOTE — PROGRESS NOTES
10/19/2023    The patient location is: [place], [place]  The chief complaint leading to consultation is: Brain mets vs radiation necrosis    Visit type: audiovisual    Face to Face time with patient: 13 minutes  48 minutes of total time spent on the encounter, which includes face to face time and non-face to face time preparing to see the patient (eg, review of tests), Obtaining and/or reviewing separately obtained history, Documenting clinical information in the electronic or other health record, Independently interpreting results (not separately reported) and communicating results to the patient/family/caregiver, or Care coordination (not separately reported).   Each patient to whom he or she provides medical services by telemedicine is:  (1) informed of the relationship between the physician and patient and the respective role of any other health care provider with respect to management of the patient; and (2) notified that he or she may decline to receive medical services by telemedicine and may withdraw from such care at any time.      Radiation Oncology Consultation    Assessment   This is a 73 y.o. 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 He has had multiple systemic therapies and courses of radiation since that time. Most recent MRI Brain 9/29/23 with 3 enhancing lesions, 2 of which appear to have been previously treated. He is referred for consideration of treatment options.    Unfortunately I have no prior images before his 9/29/23 MRI and none of his prior radiation records. The cerebellar lesions could be radiation necrosis rather than new metastases, but I cannot make that determination without more records. Since there is an alternative diagnosis, I do not recommend proceeding with additional radiation until I can review those records. He was unsure of any dates of his prior treatments and directed me to reach out to his former Radiation  Oncologist Dr. Baylee Hinson, who has since left [facility].        Plan   1) Will try to obtain prior radiation records from [facility] and [facility].            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 In 10/2018 he was discovered to have metastatic disease. He has been treated with multiple systemic therapies off and on since that time. He has also had multiple courses of radiation (no records available at time of consult; summary of treatments per outside Medical Oncology note). CT C/A/P 9/8/23 demonstrated systemic progression of disease. MRI Brain 9/29/23 demonstrated 2 bilateral enhancing lesions with appearance of prior treatment effect as well as a smaller Right anterior frontal dural-based lesion. He met with Dr. Michael on 10/5/23 for second opinion on systemic therapy options. She has in turn referred him for consideration of management of brain metastases.     I met with the patient and his son by video visit. He reports having confusion starting in September, which prompted the MRI and then initiation of dexamethasone. Confusion has improved with dex, but this has also caused hyperglycemia. His dex was recently decreased by another physician. He denies focal weakness.       Is the patient female between ages 15-55:  no    Does the patient have a CIED:  no    Prior Radiation History:   Course 1: Prostate radiation at [facility] 7/2012  Course 2: SBRT to SI Joint  Course 3: 7/23/21  SRS to 4 brain metastases  Course 4: Palliative RT to ampulla/metastatic deposit in abdomen w/ Dr. Hinson at [facility]        No past medical history on file.    Past Surgical History:   Procedure Laterality Date    ENDOSCOPY OF PROXIMAL SMALL INTESTINE N/A 8/18/2022    Procedure: Upper DBE with general anesthesia;  Surgeon: Michael Silveira MD;  Location: [facility];  Service: Endoscopy;   "Laterality: N/A;    RADICAL NEPHRECTOMY Right 08/18/2016    THYROIDECTOMY         Social History     Tobacco Use    Smoking status: Never       Cancer-related family history is not on file.    Current Outpatient Medications on File Prior to Visit   Medication Sig Dispense Refill    apremilast (OTEZLA) 30 mg Tab Take 30 mg by mouth 2 (two) times daily.      blood sugar diagnostic Strp 1 strip by Misc.(Non-Drug; Combo Route) route 2 (two) times daily with meals. 100 strip 11    blood-glucose meter kit Use as instructed 1 each 0    insulin aspart U-100 (NOVOLOG U-100 INSULIN ASPART) 100 unit/mL injection Inject 5 Units into the skin 3 (three) times daily before meals. 13.5 mL 0    insulin aspart U-100 (NOVOLOG U-100 INSULIN ASPART) 100 unit/mL injection Moderate dose: Novolog correction based on before meal glucose: 150-200: add 2 units 201-250: add 4 units 251-300: add 6 units 301-350: add 8 units >350: add 10 units      insulin detemir U-100 (LEVEMIR) 100 unit/mL injection Inject 10 Units into the skin 2 (two) times a day. 18 mL 0    insulin syringe-needle U-100 0.3 mL 30 gauge x 5/16" Syrg 1 each by Misc.(Non-Drug; Combo Route) route 2 (two) times daily with meals. 100 each 11    irbesartan (AVAPRO) 150 MG tablet   0    lancets Misc 1 lancet  by Misc.(Non-Drug; Combo Route) route 2 (two) times daily with meals. 100 each 11    lancing device Misc 1 Device by Misc.(Non-Drug; Combo Route) route 2 (two) times daily with meals. 1 each 0    lenvatinib (LENVIMA) 14 mg/day(10 mg x 1-4 mg x 1) Cap Take by mouth.      levothyroxine (SYNTHROID) 200 MCG tablet   0    metformin (GLUCOPHAGE) 500 MG tablet   0    pembrolizumab (KEYTRUDA IV) Inject into the vein.      pen needle, diabetic 31 gauge x 5/16" Ndle 1 each by Misc.(Non-Drug; Combo Route) route 2 (two) times daily with meals. 100 each 11    pravastatin (PRAVACHOL) 40 MG tablet Take 20 mg by mouth once daily. Taking 1/2 of 40 mg tablet daily    0    " triamterene-hydrochlorothiazide 37.5-25 mg (MAXZIDE-25) 37.5-25 mg per tablet   0     No current facility-administered medications on file prior to visit.       Review of patient's allergies indicates:   Allergen Reactions    Iodinated contrast media Rash         Vital Signs: There were no vitals taken for this visit.    ECOG Performance Status: (2) Ambulatory and capable of self care, unable to carry out work activity, up and about > 50% or waking hours    Physical Exam  Constitutional:       Appearance: Normal appearance.   HENT:      Head: Normocephalic and atraumatic.   Eyes:      General: No scleral icterus.     Extraocular Movements: Extraocular movements intact.   Pulmonary:      Effort: No accessory muscle usage or respiratory distress.   Neurological:      Mental Status: He is alert and oriented to person, place, and time.   Psychiatric:         Mood and Affect: Mood and affect normal.         Judgment: Judgment normal.          Labs:    Imaging: I have personally reviewed the patient's available images and reports and summarized pertinent findings above in HPI.     Pathology: I have personally reviewed the patient's available pathology and summarized pertinent findings above in HPI.       - Thank you for allowing me to participate in the care of your patient.    Donn Velazquez MD, PhD

## 2023-10-24 ENCOUNTER — LAB VISIT (OUTPATIENT)
Dept: LAB | Facility: HOSPITAL | Age: 73
End: 2023-10-24
Attending: HOSPITALIST
Payer: COMMERCIAL

## 2023-10-24 DIAGNOSIS — E11.00 TYPE II DIABETES MELLITUS WITH HYPEROSMOLARITY, UNCONTROLLED: Primary | ICD-10-CM

## 2023-10-24 LAB
ALBUMIN SERPL BCP-MCNC: 1.9 G/DL (ref 3.5–5.2)
ALP SERPL-CCNC: 145 U/L (ref 55–135)
ALT SERPL W/O P-5'-P-CCNC: 18 U/L (ref 10–44)
ANION GAP SERPL CALC-SCNC: 8 MMOL/L (ref 8–16)
AST SERPL-CCNC: 10 U/L (ref 10–40)
BASOPHILS # BLD AUTO: 0 K/UL (ref 0–0.2)
BASOPHILS NFR BLD: 0 % (ref 0–1.9)
BILIRUB SERPL-MCNC: 0.4 MG/DL (ref 0.1–1)
BUN SERPL-MCNC: 28 MG/DL (ref 8–23)
CALCIUM SERPL-MCNC: 9.6 MG/DL (ref 8.7–10.5)
CHLORIDE SERPL-SCNC: 100 MMOL/L (ref 95–110)
CO2 SERPL-SCNC: 22 MMOL/L (ref 23–29)
CREAT SERPL-MCNC: 0.8 MG/DL (ref 0.5–1.4)
DIFFERENTIAL METHOD: ABNORMAL
EOSINOPHIL # BLD AUTO: 0 K/UL (ref 0–0.5)
EOSINOPHIL NFR BLD: 0 % (ref 0–8)
ERYTHROCYTE [DISTWIDTH] IN BLOOD BY AUTOMATED COUNT: 19.8 % (ref 11.5–14.5)
EST. GFR  (NO RACE VARIABLE): >60 ML/MIN/1.73 M^2
GLUCOSE SERPL-MCNC: 335 MG/DL (ref 70–110)
HCT VFR BLD AUTO: 28 % (ref 40–54)
HGB BLD-MCNC: 8.4 G/DL (ref 14–18)
IMM GRANULOCYTES # BLD AUTO: 0.12 K/UL (ref 0–0.04)
IMM GRANULOCYTES NFR BLD AUTO: 1.7 % (ref 0–0.5)
LYMPHOCYTES # BLD AUTO: 0.2 K/UL (ref 1–4.8)
LYMPHOCYTES NFR BLD: 3.2 % (ref 18–48)
MCH RBC QN AUTO: 26.3 PG (ref 27–31)
MCHC RBC AUTO-ENTMCNC: 30 G/DL (ref 32–36)
MCV RBC AUTO: 88 FL (ref 82–98)
MONOCYTES # BLD AUTO: 0.3 K/UL (ref 0.3–1)
MONOCYTES NFR BLD: 3.7 % (ref 4–15)
NEUTROPHILS # BLD AUTO: 6.5 K/UL (ref 1.8–7.7)
NEUTROPHILS NFR BLD: 91.4 % (ref 38–73)
NRBC BLD-RTO: 0 /100 WBC
PLATELET # BLD AUTO: 208 K/UL (ref 150–450)
PMV BLD AUTO: 9 FL (ref 9.2–12.9)
POTASSIUM SERPL-SCNC: 4.8 MMOL/L (ref 3.5–5.1)
PROT SERPL-MCNC: 5.6 G/DL (ref 6–8.4)
RBC # BLD AUTO: 3.2 M/UL (ref 4.6–6.2)
SODIUM SERPL-SCNC: 130 MMOL/L (ref 136–145)
WBC # BLD AUTO: 7.12 K/UL (ref 3.9–12.7)

## 2023-10-24 PROCEDURE — 85025 COMPLETE CBC W/AUTO DIFF WBC: CPT | Performed by: HOSPITALIST

## 2023-10-24 PROCEDURE — 80053 COMPREHEN METABOLIC PANEL: CPT | Performed by: HOSPITALIST

## 2023-10-25 ENCOUNTER — TELEPHONE (OUTPATIENT)
Dept: DIABETES | Facility: CLINIC | Age: 73
End: 2023-10-25

## 2023-10-25 DIAGNOSIS — Z79.4 TYPE 2 DIABETES MELLITUS WITH HYPERGLYCEMIA, WITH LONG-TERM CURRENT USE OF INSULIN: Primary | ICD-10-CM

## 2023-10-25 DIAGNOSIS — E11.65 TYPE 2 DIABETES MELLITUS WITH HYPERGLYCEMIA, WITH LONG-TERM CURRENT USE OF INSULIN: Primary | ICD-10-CM

## 2023-10-27 ENCOUNTER — OFFICE VISIT (OUTPATIENT)
Dept: RADIATION ONCOLOGY | Facility: CLINIC | Age: 73
End: 2023-10-27
Payer: COMMERCIAL

## 2023-10-27 VITALS
WEIGHT: 179 LBS | DIASTOLIC BLOOD PRESSURE: 61 MMHG | BODY MASS INDEX: 29.79 KG/M2 | OXYGEN SATURATION: 95 % | HEART RATE: 80 BPM | TEMPERATURE: 98 F | SYSTOLIC BLOOD PRESSURE: 128 MMHG

## 2023-10-27 DIAGNOSIS — C79.31 METASTATIC RENAL CELL CARCINOMA TO BRAIN: Primary | ICD-10-CM

## 2023-10-27 DIAGNOSIS — C64.9 METASTATIC RENAL CELL CARCINOMA TO BRAIN: Primary | ICD-10-CM

## 2023-10-27 PROCEDURE — 1126F PR PAIN SEVERITY QUANTIFIED, NO PAIN PRESENT: ICD-10-PCS | Mod: CPTII,S$GLB,, | Performed by: RADIOLOGY

## 2023-10-27 PROCEDURE — 3078F PR MOST RECENT DIASTOLIC BLOOD PRESSURE < 80 MM HG: ICD-10-PCS | Mod: CPTII,S$GLB,, | Performed by: RADIOLOGY

## 2023-10-27 PROCEDURE — 1101F PT FALLS ASSESS-DOCD LE1/YR: CPT | Mod: CPTII,S$GLB,, | Performed by: RADIOLOGY

## 2023-10-27 PROCEDURE — 99999 PR PBB SHADOW E&M-EST. PATIENT-LVL IV: CPT | Mod: PBBFAC,,, | Performed by: RADIOLOGY

## 2023-10-27 PROCEDURE — 1126F AMNT PAIN NOTED NONE PRSNT: CPT | Mod: CPTII,S$GLB,, | Performed by: RADIOLOGY

## 2023-10-27 PROCEDURE — 3288F FALL RISK ASSESSMENT DOCD: CPT | Mod: CPTII,S$GLB,, | Performed by: RADIOLOGY

## 2023-10-27 PROCEDURE — 1159F MED LIST DOCD IN RCRD: CPT | Mod: CPTII,S$GLB,, | Performed by: RADIOLOGY

## 2023-10-27 PROCEDURE — 99214 OFFICE O/P EST MOD 30 MIN: CPT | Mod: 25,S$GLB,, | Performed by: RADIOLOGY

## 2023-10-27 PROCEDURE — 99214 PR OFFICE/OUTPT VISIT, EST, LEVL IV, 30-39 MIN: ICD-10-PCS | Mod: 25,S$GLB,, | Performed by: RADIOLOGY

## 2023-10-27 PROCEDURE — 77263 PR  RADIATION THERAPY PLAN COMPLEX: ICD-10-PCS | Mod: ,,, | Performed by: RADIOLOGY

## 2023-10-27 PROCEDURE — 3078F DIAST BP <80 MM HG: CPT | Mod: CPTII,S$GLB,, | Performed by: RADIOLOGY

## 2023-10-27 PROCEDURE — 3288F PR FALLS RISK ASSESSMENT DOCUMENTED: ICD-10-PCS | Mod: CPTII,S$GLB,, | Performed by: RADIOLOGY

## 2023-10-27 PROCEDURE — 1159F PR MEDICATION LIST DOCUMENTED IN MEDICAL RECORD: ICD-10-PCS | Mod: CPTII,S$GLB,, | Performed by: RADIOLOGY

## 2023-10-27 PROCEDURE — 3074F PR MOST RECENT SYSTOLIC BLOOD PRESSURE < 130 MM HG: ICD-10-PCS | Mod: CPTII,S$GLB,, | Performed by: RADIOLOGY

## 2023-10-27 PROCEDURE — 3074F SYST BP LT 130 MM HG: CPT | Mod: CPTII,S$GLB,, | Performed by: RADIOLOGY

## 2023-10-27 PROCEDURE — 99999 PR PBB SHADOW E&M-EST. PATIENT-LVL IV: ICD-10-PCS | Mod: PBBFAC,,, | Performed by: RADIOLOGY

## 2023-10-27 PROCEDURE — 1101F PR PT FALLS ASSESS DOC 0-1 FALLS W/OUT INJ PAST YR: ICD-10-PCS | Mod: CPTII,S$GLB,, | Performed by: RADIOLOGY

## 2023-10-27 PROCEDURE — 77263 THER RADIOLOGY TX PLNG CPLX: CPT | Mod: ,,, | Performed by: RADIOLOGY

## 2023-10-30 NOTE — PROGRESS NOTES
10/27/2023    Radiation Oncology Follow-Up Visit      Prior Radiation History:   Course 1: Prostate radiation at [facility] 7/2012  Course 2: SBRT to SI Joint  Course 3: 7/23/21  SRS to 4 brain metastases  Course 4: Palliative RT to ampulla/metastatic deposit in abdomen w/ Dr. Hinson at [facility]    Is the patient female between ages 15-55:  No    Does the patient have a CIED:  No      Assessment   This is a 73 y.o. 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 He has had multiple systemic therapies and courses of radiation since that time including SRS to 4 metastases (2 Right cerebellar, 1 Left cerebellar, 1 Right anterior frontal periventricular) 7/23/23 at [facility] with Dr. Baylee Hinson. Most recent MRI Brain 9/29/23 with 3 enhancing lesions, 2 of which appear to have been previously treated. He is referred for consideration of treatment options.    Upon my review of his MRI from 5/2023 and 9/2023, all lesions are stable with the exception of interval development of the Right anterior frontal dural based enhancing lesion; this is consistent with new metastasis. The other lesions were included in his prior SRS fields from 7/2021 and do not appear worse compared to May 2023 scan.     I recommend treating the Right anterior frontal metastasis to 24 Gy x1 with Gamma Knife radiosurgery. I discussed potential side effects including short-term vasogenic edema and late radiation necrosis, which could result in neurologic deficits. At the end of our discussion, he was in agreement with proceeding with the recommended treatment.          Plan   1) Referral to Dr. Ragland for co-planning of Gamma Knife radiosurgery.   2) Schedule MRI Brain stealth w/ contrast for radiosurgery treatment planning.   3) Schedule Gamma Knife radiosurgery (frameless).            CHIEF COMPLAINT: Brain metastasis    HPI/Focused ROS: Since his initial consultation with me, I received dosimetry from his SRS  treatment at [facility] in 7/2021. I also spoke with his former Radiation Oncologist Dr. Baylee Hinson to confirm his radiation history. Per her history, he developed significant radiation necrosis following SRS when the patient was started on Ipi/Nivo combination immunotherapy. I was able to obtain his most recent prior MRI performed at [facility] 5/4/23 and compare with the MRI performed at [facility] 9/29/23. He is here today to discuss next steps in treatment.     In clinic today, accompanied by his wife and son. Blood sugars are improving, though he has not been sticking to a low sugar diet. Currently on dexamethasone 2 mg bid. They have not noted worsening of AMS with decrease in decadron. Denies focal weakness, visual change, or seizures.       History reviewed. No pertinent past medical history.    Past Surgical History:   Procedure Laterality Date    ENDOSCOPY OF PROXIMAL SMALL INTESTINE N/A 8/18/2022    Procedure: Upper DBE with general anesthesia;  Surgeon: Michael Silveira MD;  Location: [facility];  Service: Endoscopy;  Laterality: N/A;    RADICAL NEPHRECTOMY Right 08/18/2016    THYROIDECTOMY         Social History     Tobacco Use    Smoking status: Never       Cancer-related family history is not on file.    Current Outpatient Medications on File Prior to Visit   Medication Sig Dispense Refill    apremilast (OTEZLA) 30 mg Tab Take 30 mg by mouth 2 (two) times daily.      blood sugar diagnostic Strp 1 strip by Misc.(Non-Drug; Combo Route) route 2 (two) times daily with meals. 100 strip 11    blood-glucose meter kit Use as instructed 1 each 0    insulin aspart U-100 (NOVOLOG U-100 INSULIN ASPART) 100 unit/mL injection Inject 5 Units into the skin 3 (three) times daily before meals. 13.5 mL 0    insulin aspart U-100 (NOVOLOG U-100 INSULIN ASPART) 100 unit/mL injection Moderate dose: Novolog correction based on before meal glucose: 150-200: add 2 units 201-250: add 4 units 251-300: add 6 units 301-350: add 8 units  ">350: add 10 units      insulin detemir U-100 (LEVEMIR) 100 unit/mL injection Inject 10 Units into the skin 2 (two) times a day. 18 mL 0    insulin syringe-needle U-100 0.3 mL 30 gauge x 5/16" Syrg 1 each by Misc.(Non-Drug; Combo Route) route 2 (two) times daily with meals. 100 each 11    irbesartan (AVAPRO) 150 MG tablet   0    lancets Misc 1 lancet  by Misc.(Non-Drug; Combo Route) route 2 (two) times daily with meals. 100 each 11    lancing device Misc 1 Device by Misc.(Non-Drug; Combo Route) route 2 (two) times daily with meals. 1 each 0    lenvatinib (LENVIMA) 14 mg/day(10 mg x 1-4 mg x 1) Cap Take by mouth.      levothyroxine (SYNTHROID) 200 MCG tablet   0    metformin (GLUCOPHAGE) 500 MG tablet   0    pembrolizumab (KEYTRUDA IV) Inject into the vein.      pen needle, diabetic 31 gauge x 5/16" Ndle 1 each by Misc.(Non-Drug; Combo Route) route 2 (two) times daily with meals. 100 each 11    pravastatin (PRAVACHOL) 40 MG tablet Take 20 mg by mouth once daily. Taking 1/2 of 40 mg tablet daily    0    triamterene-hydrochlorothiazide 37.5-25 mg (MAXZIDE-25) 37.5-25 mg per tablet   0     No current facility-administered medications on file prior to visit.       Review of patient's allergies indicates:   Allergen Reactions    Iodinated contrast media Rash         Vital Signs: /61 (BP Location: Right arm, Patient Position: Sitting, BP Method: Large (Automatic))   Pulse 80   Temp 97.9 °F (36.6 °C)   Wt 81.2 kg (179 lb 0.2 oz)   SpO2 95%   BMI 29.79 kg/m²     ECOG Performance Status: 1 - Ambulates, capable of light work    Physical Exam  Constitutional:       Appearance: Normal appearance.   HENT:      Head: Normocephalic and atraumatic.   Eyes:      General: No scleral icterus.     Extraocular Movements: Extraocular movements intact.   Pulmonary:      Effort: No accessory muscle usage or respiratory distress.   Musculoskeletal:      Cervical back: Normal range of motion.   Neurological:      Mental Status: He " is alert and oriented to person, place, and time.      Comments: In clinic supplied wheelchair   Psychiatric:         Mood and Affect: Mood and affect normal.         Judgment: Judgment normal.          Labs:    Imaging: I have personally reviewed the patient's available images and reports and summarized pertinent findings above in HPI.     Pathology: No new path

## 2023-10-31 ENCOUNTER — HOSPITAL ENCOUNTER (OUTPATIENT)
Dept: RADIOLOGY | Facility: HOSPITAL | Age: 73
Discharge: HOME | End: 2023-10-31
Attending: NEUROLOGICAL SURGERY
Payer: COMMERCIAL

## 2023-10-31 DIAGNOSIS — C64.9 METASTATIC RENAL CELL CARCINOMA TO BRAIN: ICD-10-CM

## 2023-10-31 DIAGNOSIS — C79.31 METASTATIC RENAL CELL CARCINOMA TO BRAIN: ICD-10-CM

## 2023-10-31 PROCEDURE — A9585 GADOBUTROL INJECTION: HCPCS | Performed by: NEUROLOGICAL SURGERY

## 2023-10-31 PROCEDURE — 70552 MRI BRAIN STEM W/DYE: CPT | Mod: TC

## 2023-10-31 PROCEDURE — 25500020 PHARM REV CODE 255: Performed by: NEUROLOGICAL SURGERY

## 2023-10-31 PROCEDURE — 70552 MRI BRAIN STEALTH W/O FIDUCIALS WITH CONTRAST: ICD-10-PCS | Mod: 26,,, | Performed by: RADIOLOGY

## 2023-10-31 PROCEDURE — 70552 MRI BRAIN STEM W/DYE: CPT | Mod: 26,,, | Performed by: RADIOLOGY

## 2023-10-31 RX ORDER — GADOBUTROL 604.72 MG/ML
9 INJECTION INTRAVENOUS
Status: COMPLETED | OUTPATIENT
Start: 2023-10-31 | End: 2023-10-31

## 2023-10-31 RX ADMIN — GADOBUTROL 9 ML: 604.72 INJECTION INTRAVENOUS at 08:10

## 2023-11-02 ENCOUNTER — TELEPHONE (OUTPATIENT)
Dept: NEUROSURGERY | Facility: CLINIC | Age: 73
End: 2023-11-02
Payer: COMMERCIAL

## 2023-11-03 ENCOUNTER — TELEPHONE (OUTPATIENT)
Dept: NEUROSURGERY | Facility: CLINIC | Age: 73
End: 2023-11-03
Payer: COMMERCIAL

## 2023-11-03 ENCOUNTER — APPOINTMENT (OUTPATIENT)
Dept: RADIATION THERAPY | Facility: HOSPITAL | Age: 73
End: 2023-11-03
Attending: RADIOLOGY
Payer: COMMERCIAL

## 2023-11-03 PROCEDURE — 77334 RADIATION TREATMENT AID(S): CPT | Mod: 26,,, | Performed by: RADIOLOGY

## 2023-11-03 PROCEDURE — 77290 THER RAD SIMULAJ FIELD CPLX: CPT | Mod: 26,,, | Performed by: RADIOLOGY

## 2023-11-03 PROCEDURE — 77334 RADIATION TREATMENT AID(S): CPT | Mod: TC | Performed by: RADIOLOGY

## 2023-11-03 PROCEDURE — 77290 THER RAD SIMULAJ FIELD CPLX: CPT | Mod: TC | Performed by: RADIOLOGY

## 2023-11-03 PROCEDURE — 77334 PR  RADN TREATMENT AID(S) COMPLX: ICD-10-PCS | Mod: 26,,, | Performed by: RADIOLOGY

## 2023-11-03 PROCEDURE — 77290 PR  SET RADN THERAPY FIELD COMPLEX: ICD-10-PCS | Mod: 26,,, | Performed by: RADIOLOGY

## 2023-11-08 ENCOUNTER — PATIENT MESSAGE (OUTPATIENT)
Dept: HEMATOLOGY/ONCOLOGY | Facility: CLINIC | Age: 73
End: 2023-11-08
Payer: COMMERCIAL

## 2023-11-08 ENCOUNTER — TELEPHONE (OUTPATIENT)
Dept: HEMATOLOGY/ONCOLOGY | Facility: CLINIC | Age: 73
End: 2023-11-08
Payer: COMMERCIAL

## 2023-11-08 ENCOUNTER — OFFICE VISIT (OUTPATIENT)
Dept: NEUROSURGERY | Facility: CLINIC | Age: 73
End: 2023-11-08
Payer: COMMERCIAL

## 2023-11-08 DIAGNOSIS — C64.9 METASTATIC RENAL CELL CARCINOMA TO BRAIN: Primary | ICD-10-CM

## 2023-11-08 DIAGNOSIS — C79.31 METASTATIC RENAL CELL CARCINOMA TO BRAIN: Primary | ICD-10-CM

## 2023-11-08 PROCEDURE — 99204 OFFICE O/P NEW MOD 45 MIN: CPT | Mod: 95,,, | Performed by: NEUROLOGICAL SURGERY

## 2023-11-08 PROCEDURE — 1160F PR REVIEW ALL MEDS BY PRESCRIBER/CLIN PHARMACIST DOCUMENTED: ICD-10-PCS | Mod: CPTII,95,, | Performed by: NEUROLOGICAL SURGERY

## 2023-11-08 PROCEDURE — 1160F RVW MEDS BY RX/DR IN RCRD: CPT | Mod: CPTII,95,, | Performed by: NEUROLOGICAL SURGERY

## 2023-11-08 PROCEDURE — 1159F MED LIST DOCD IN RCRD: CPT | Mod: CPTII,95,, | Performed by: NEUROLOGICAL SURGERY

## 2023-11-08 PROCEDURE — 1159F PR MEDICATION LIST DOCUMENTED IN MEDICAL RECORD: ICD-10-PCS | Mod: CPTII,95,, | Performed by: NEUROLOGICAL SURGERY

## 2023-11-08 PROCEDURE — 99204 PR OFFICE/OUTPT VISIT, NEW, LEVL IV, 45-59 MIN: ICD-10-PCS | Mod: 95,,, | Performed by: NEUROLOGICAL SURGERY

## 2023-11-08 NOTE — PROGRESS NOTES
The patient location is: [place]  The chief complaint leading to consultation is: GK for mets    Visit type: audiovisual    Face to Face time with patient: 15 minutes  30 minutes of total time spent on the encounter, which includes face to face time and non-face to face time preparing to see the patient (eg, review of tests), Obtaining and/or reviewing separately obtained history, Documenting clinical information in the electronic or other health record, Independently interpreting results (not separately reported) and communicating results to the patient/family/caregiver, or Care coordination (not separately reported).         Each patient to whom he or she provides medical services by telemedicine is:  (1) informed of the relationship between the physician and patient and the respective role of any other health care provider with respect to management of the patient; and (2) notified that he or she may decline to receive medical services by telemedicine and may withdraw from such care at any time.    Notes:    Subjective:   I, Josie Mejia, attest that this documentation has been prepared under the direction and in the presence of Bartolo Ragland MD.     Patient ID: Mark Coppola is a 73 y.o. male     Chief Complaint: No chief complaint on file.      HPI  Mr. Mark Coppola is a 73 y.o. gentleman with Stage IV RCC s/p Left nephrectomy 7/2016 with development of metastatic disease in 2018. He has had multiple systemic therapies and courses of radiation since that time including SRS to 4 metastases (2 Right cerebellar, 1 Left cerebellar, 1 Right anterior frontal periventricular) 7/23/23 at [facility] with Dr. Baylee Hinson. Most recent MRI Brain 9/29/23 with 3 enhancing lesions, 2 which were previously treated. There is interval development of the Right anterior frontal dural based enhancing lesion, consistent with new metastasis.  He is referred to me for treatment planning of his new lesion. Denies headaches, changes in  vision, or any other neurologic deficits at this time.    Review of Systems   Constitutional:  Negative for activity change, appetite change, fatigue, fever and unexpected weight change.   HENT:  Negative for facial swelling.    Eyes: Negative.    Respiratory: Negative.     Cardiovascular: Negative.    Gastrointestinal:  Negative for diarrhea, nausea and vomiting.   Endocrine: Negative.    Genitourinary: Negative.    Musculoskeletal:  Negative for back pain, joint swelling, myalgias and neck pain.   Neurological:  Negative for dizziness, seizures, weakness, numbness and headaches.   Psychiatric/Behavioral: Negative.          No past medical history on file.    Objective:    There were no vitals filed for this visit.   Physical Exam  Constitutional:       General: He is not in acute distress.     Appearance: Normal appearance.   HENT:      Head: Normocephalic and atraumatic.   Neurological:      Mental Status: He is alert and oriented to person, place, and time.         IMAGING:  MRI Brain Stealth (10/31/2023):  Limited postcontrast only MRI brain for intraoperative navigation/preop planning purposes.  Previously identified lesion scattered enhancing lesions again identified relatively similar.  Allowing for single sequence postcontrast examination there is no definite new lesion.     I have personally reviewed the images with the pt.      I, Dr. Bartolo Ragland, personally performed the services described in this documentation. All medical record entries made by the scribe, Josie Mejia, were at my direction and in my presence.  I have reviewed the chart and agree that the record reflects my personal performance and is accurate and complete. Bartolo Ragland MD. 11/08/2023    Assessment:       Renal cell carcinoma.  Brain metastasis.      Plan:   I have personally reviewed the MRI brain with the pt which shows limited postcontrast only MRI brain for intraoperative navigation/preop planning purposes.  Previously identified  lesion scattered enhancing lesions again identified relatively similar.  Allowing for single sequence postcontrast examination there is no definite new lesion.     I recommend GK SRS for brain mets. I have discussed the risks/benefits, indications, and alternatives for the proposed procedure in detail. I have answered all of their questions and patient wish to proceed with surgery. We will schedule patient.    In addition to this, the pt presented to me with changes in mentation and gait instability. I have discussed the possibility of NPH and suggested performing a high-volume lumbar puncture to establish diagnosis of NPH and predict response to  shunting. I have discussed the risks/benefits, indications, and alternatives for the proposed proedure in detail. I have answered all of their questions and the pt would like some time to consider. The patient and his family would like to proceed with radiosurgery first and would like to re-discuss LP afterwards.

## 2023-11-08 NOTE — NURSING
Attempted to contact patient for follow up scheduling with Dr. Michael. No answer. Detailed voicemail with Oncology Nurse Navigator's direct contact number provided for return call. Portal message also sent for 11/28, 8am as tentative appointment time.

## 2023-11-08 NOTE — PATIENT INSTRUCTIONS
I have personally reviewed the MRI brain with the pt which shows limited postcontrast only MRI brain for intraoperative navigation/preop planning purposes.  Previously identified lesion scattered enhancing lesions again identified relatively similar.  Allowing for single sequence postcontrast examination there is no definite new lesion.     I recommend GK SRS for brain mets. I have discussed the risks/benefits, indications, and alternatives for the proposed procedure in detail. I have answered all of their questions and patient wish to proceed with surgery. We will schedule patient.    In addition to this, the pt presented to me with changes in mentation and gait instability. I have discussed the possibility of NPH and suggested performing a high-volume lumbar puncture to establish diagnosis of NPH and predict response to  shunting. I have discussed the risks/benefits, indications, and alternatives for the proposed proedure in detail. I have answered all of their questions and the pt would like some time to consider. The patient and his family would like to proceed with radiosurgery first and would like to re-discuss LP afterwards.

## 2023-11-09 PROCEDURE — 77470 PR  SPECIAL RADIATION TREATMENT: ICD-10-PCS | Mod: 26,59,, | Performed by: RADIOLOGY

## 2023-11-09 PROCEDURE — 77470 SPECIAL RADIATION TREATMENT: CPT | Mod: 59,TC | Performed by: RADIOLOGY

## 2023-11-09 PROCEDURE — 77470 SPECIAL RADIATION TREATMENT: CPT | Mod: 26,59,, | Performed by: RADIOLOGY

## 2023-11-10 ENCOUNTER — DOCUMENT SCAN (OUTPATIENT)
Dept: HOME HEALTH SERVICES | Facility: HOSPITAL | Age: 73
End: 2023-11-10
Payer: COMMERCIAL

## 2023-11-10 ENCOUNTER — PATIENT MESSAGE (OUTPATIENT)
Dept: RADIATION THERAPY | Facility: HOSPITAL | Age: 73
End: 2023-11-10
Payer: COMMERCIAL

## 2023-11-10 ENCOUNTER — PROCEDURE VISIT (OUTPATIENT)
Dept: RADIATION THERAPY | Facility: HOSPITAL | Age: 73
End: 2023-11-10
Payer: COMMERCIAL

## 2023-11-10 DIAGNOSIS — C79.31 METASTATIC RENAL CELL CARCINOMA TO BRAIN: Primary | ICD-10-CM

## 2023-11-10 DIAGNOSIS — C64.9 METASTATIC RENAL CELL CARCINOMA TO BRAIN: Primary | ICD-10-CM

## 2023-11-10 PROCEDURE — 77370 RADIATION PHYSICS CONSULT: CPT | Performed by: RADIOLOGY

## 2023-11-10 PROCEDURE — 77295 3-D RADIOTHERAPY PLAN: CPT | Mod: TC | Performed by: RADIOLOGY

## 2023-11-10 PROCEDURE — 77300 PR RADIATION THERAPY,DOSIMETRY PLAN: ICD-10-PCS | Mod: 26,,, | Performed by: RADIOLOGY

## 2023-11-10 PROCEDURE — 77334 RADIATION TREATMENT AID(S): CPT | Mod: 26,,, | Performed by: RADIOLOGY

## 2023-11-10 PROCEDURE — 77295 3-D RADIOTHERAPY PLAN: CPT | Mod: 26,,, | Performed by: RADIOLOGY

## 2023-11-10 PROCEDURE — 77300 RADIATION THERAPY DOSE PLAN: CPT | Mod: TC | Performed by: RADIOLOGY

## 2023-11-10 PROCEDURE — 61796 SRS CRANIAL LESION SIMPLE: CPT | Mod: ,,, | Performed by: NEUROLOGICAL SURGERY

## 2023-11-10 PROCEDURE — 77334 RADIATION TREATMENT AID(S): CPT | Mod: TC | Performed by: RADIOLOGY

## 2023-11-10 PROCEDURE — 61796 PR STEREOTACTIC RADIOSURGERY, CRANIAL,SIMPLE,SINGLE: ICD-10-PCS | Mod: ,,, | Performed by: NEUROLOGICAL SURGERY

## 2023-11-10 PROCEDURE — 77300 RADIATION THERAPY DOSE PLAN: CPT | Mod: 26,,, | Performed by: RADIOLOGY

## 2023-11-10 PROCEDURE — 77432 PR STEREOTACTIC RADIATION TX MANAGEMENT CRANIAL LESION: ICD-10-PCS | Mod: ,,, | Performed by: RADIOLOGY

## 2023-11-10 PROCEDURE — 77334 PR  RADN TREATMENT AID(S) COMPLX: ICD-10-PCS | Mod: 26,,, | Performed by: RADIOLOGY

## 2023-11-10 PROCEDURE — 77371 SRS MULTISOURCE: CPT | Performed by: RADIOLOGY

## 2023-11-10 PROCEDURE — 77432 STEREOTACTIC RADIATION TRMT: CPT | Mod: ,,, | Performed by: RADIOLOGY

## 2023-11-10 PROCEDURE — 77295 PR 3D RADIOTHERAPY PLAN: ICD-10-PCS | Mod: 26,,, | Performed by: RADIOLOGY

## 2023-11-10 NOTE — PROCEDURES
Patient: Mark Coppola    MRN: [id number]    : 1950    DATE OF PROCEDURE: 11/10/2023      PRE-OPERATIVE DIAGNOSIS:  Brain metastasis       POST-OPERATIVE DIAGNOSIS:  Same         PROCEDURE PERFORMED:                 1)         Gamma Knife stereotactic radiosurgery to Right frontal brain metastasis.       RADIATION ONCOLOGIST: Donn Velazquez      NEUROSURGEON:  AMILCAR Neurosurgeon: Bartolo Ragland       MEDICAL PHYSICIST: AMILCAR Medical Physicist: Jackson Mon       PROCEDURE SUMMARY:    Target # Site Dose per Fraction (Gy) Cumulative Dose % Isodose Line Fraction # Total Fractions   1 Rt Frontal 24 24 52 1 1       INDICATIONS AND CONSENT:  Mark Coppola is a 73 y.o. male with  Stage IV RCC s/p Left nephrectomy 2016 with development of metastatic disease in 2018. He has had multiple systemic therapies and courses of radiation since that time including SRS to 4 metastases (2 Right cerebellar, 1 Left cerebellar, 1 Right anterior frontal periventricular) 23 at [facility] with Dr. Baylee Hinson. Most recent MRI Brain 23 with 3 enhancing lesions, 2 of which were previously treated.  It was recommended that he undergo Gamma Knife stereotactic radiosurgery to the new Right frontal metastasis.  The risks of this procedure as well as possible complications were discussed with the patient pre-operatively.       DESCRIPTION OF PROCEDURE:  On the day of the procedure, the patient was positioned on the table in a custom-shaped immobilization mask.  Cone-beam CT was performed for stereotactic reference, and the images were imported into the Gamma Knife planning station. Images were co-registered with pre-planning MRI, which was used to define the target volume(s) and organs at risk. Under direct physician supervision, acceptable localization of target and normal tissue was achieved with image guidance.  We targeted the lesion(s) with the dose(s) prescribed above to the margin of (each) lesion.  Treatment was carried out  without difficulty using automated positioning.  Upon completion of the Gamma Knife procedure, the immobilization mask was removed.

## 2023-11-10 NOTE — PROCEDURES
GAMMA KNIFE TREATMENT SUMMARY     NAME OF PATIENT:              Mark Coppola      DATE:                                     11/10/2023     Neurosurgeon:                       Bartolo Ragland M.D., Ph.D.     Radiation Oncologist:           Donn Velazquez M.D.     DIAGNOSIS:                          Renal cell carcinoma.  Brain metastasis.                                                                                             Operative Procedure:    Planning of gamma radiosurgery.  Delivery of gamma radiosurgery.    Indications in Detail:    Mr. Mark Coppola is a 73 y.o. gentleman with Stage IV RCC s/p Left nephrectomy 7/2016 with development of metastatic disease in 2018. He has had multiple systemic therapies and courses of radiation since that time including SRS to 4 metastases (2 Right cerebellar, 1 Left cerebellar, 1 Right anterior frontal periventricular) 7/23/23 at [facility] with Dr. Baylee Hinson. Most recent MRI Brain 9/29/23 with 3 enhancing lesions, 2 which were previously treated. There is interval development of the Right anterior frontal dural based enhancing lesion, consistent with new metastasis.  He is referred to me for treatment planning of his new lesion. Denies headaches, changes in vision, or any other neurologic deficits at this time.  Since his previous treatment, he has developed a solitary new right frontal metastasis.  I have recommend GK SRS for brain mets. I have discussed the risks/benefits, indications, and alternatives for the proposed procedure in detail. I have answered all of their questions and patient wish to proceed.     Procedure in Detail:  The patient was seen in the pretreatment area and the risks, benefits, and alternatives were discussed. The patient understood and wished to proceed. Dr. Ragland, Dr. Velazquez and Dr. Mon planned the radiosurgery based on a recent MRI that had been performed.  The right frontal target was identified.  The tumors were targeted using Gamma  Plan (Lightning planned).  The treatment consisted of 11 shots using various collimators.  Dr. Velazquez prescribed a dose of 24 Gy to the 52% isodose line. The patient had a mask that was made in used to immobilize him during the treatment.  The patient was immobilized using this mask.  The cone beam CT and plan were coregistered.  The patient was placed in the unit and the shots were delivered sequentially.  The patient was placed in the unit and the shots were delivered sequentially.  The total beam on-time was ... minutes.  The patient tolerated the treatment well.      COMPLICATIONS:  None.  ESTIMATED BLOOD LOSS:  None.  DISPOSITION:  The patient is to followup in the [facility] clinic.           Dr. Bartolo Ragland MD, Ph.D.

## 2023-11-10 NOTE — PATIENT INSTRUCTIONS
After Gamma Knife Treatment    You may return to your normal activities as you feel up to doing them.   Do not drive the day of your treatment.    When should I call the doctor?    Call your doctor right away if you have any of the following:   Seizures   Severe headache   Vision changes   Numbness in your arms and/or legs   Weakness      What follow-up care will I have?       The results of the Gamma Knife treatment do not happen right away.   They may show up over a period of time. You will have scans (CT, MRI   or angiography) done again to measure the success of the treatment.     The first scan is normally done about 2- 3 months after the treatment.   How often these scans will be done depends on your diagnosis.     It is very important to keep your follow up appointments with the   radiation oncologist and neurosurgeon. You will be given information   with the date, time and location for these appointments.  Who do I call if I have questions?     If you have questions about your Gamma Knife treatment :     The [facility] Radiation Oncology Department- Gamma Knife Suite at [phone].     To reach the neurosurgeon in the evenings or weekends, call the   hospital  at [phone] and ask for the neurosurgeon on   call.

## 2023-11-13 ENCOUNTER — TELEPHONE (OUTPATIENT)
Dept: RADIATION ONCOLOGY | Facility: CLINIC | Age: 73
End: 2023-11-13
Payer: COMMERCIAL

## 2023-11-13 PROCEDURE — 77336 RADIATION PHYSICS CONSULT: CPT | Performed by: RADIOLOGY

## 2023-11-13 NOTE — TELEPHONE ENCOUNTER
I spoke with patient's son this morning to inquire how he tolerated increasing dexamethasone to 4 mg bid over the weekend (from 2 mg bid last Friday). There was no improvement in his confusion, but his blood glucose increased and was difficult to manage.     Since he had no improvement with increase in decadron, I do not believe his AMS is primarily related to cerebral edema. I provided the following taper schedule:   - Today start 2 mg bid x4 days  - Then 2 mg daily x7 days  - Then 2 mg every other day x 7 days, then stop.     He is scheduled for re-staging MRI Brain and follow-up in Neuro-Oncology clinic in February 2024.

## 2023-11-14 ENCOUNTER — PATIENT MESSAGE (OUTPATIENT)
Dept: NEUROSURGERY | Facility: CLINIC | Age: 73
End: 2023-11-14
Payer: COMMERCIAL

## 2023-11-14 ENCOUNTER — LAB VISIT (OUTPATIENT)
Dept: LAB | Facility: HOSPITAL | Age: 73
End: 2023-11-14
Attending: HOSPITALIST
Payer: COMMERCIAL

## 2023-11-14 DIAGNOSIS — E11.00 TYPE II DIABETES MELLITUS WITH HYPEROSMOLARITY, UNCONTROLLED: Primary | ICD-10-CM

## 2023-11-14 LAB
ALBUMIN SERPL BCP-MCNC: 1.9 G/DL (ref 3.5–5.2)
ALP SERPL-CCNC: 155 U/L (ref 55–135)
ALT SERPL W/O P-5'-P-CCNC: 29 U/L (ref 10–44)
ANION GAP SERPL CALC-SCNC: 8 MMOL/L (ref 8–16)
AST SERPL-CCNC: 12 U/L (ref 10–40)
BACTERIA #/AREA URNS AUTO: NORMAL /HPF
BASOPHILS # BLD AUTO: 0.01 K/UL (ref 0–0.2)
BASOPHILS NFR BLD: 0.1 % (ref 0–1.9)
BILIRUB SERPL-MCNC: 0.4 MG/DL (ref 0.1–1)
BILIRUB UR QL STRIP: NEGATIVE
BUN SERPL-MCNC: 50 MG/DL (ref 8–23)
CALCIUM SERPL-MCNC: 10.7 MG/DL (ref 8.7–10.5)
CHLORIDE SERPL-SCNC: 101 MMOL/L (ref 95–110)
CLARITY UR REFRACT.AUTO: CLEAR
CO2 SERPL-SCNC: 23 MMOL/L (ref 23–29)
COLOR UR AUTO: YELLOW
CREAT SERPL-MCNC: 0.9 MG/DL (ref 0.5–1.4)
DIFFERENTIAL METHOD: ABNORMAL
EOSINOPHIL # BLD AUTO: 0 K/UL (ref 0–0.5)
EOSINOPHIL NFR BLD: 0 % (ref 0–8)
ERYTHROCYTE [DISTWIDTH] IN BLOOD BY AUTOMATED COUNT: 19.2 % (ref 11.5–14.5)
EST. GFR  (NO RACE VARIABLE): >60 ML/MIN/1.73 M^2
GLUCOSE SERPL-MCNC: 322 MG/DL (ref 70–110)
GLUCOSE UR QL STRIP: ABNORMAL
HCT VFR BLD AUTO: 28 % (ref 40–54)
HGB BLD-MCNC: 8.1 G/DL (ref 14–18)
HGB UR QL STRIP: NEGATIVE
IMM GRANULOCYTES # BLD AUTO: 0.13 K/UL (ref 0–0.04)
IMM GRANULOCYTES NFR BLD AUTO: 1.3 % (ref 0–0.5)
KETONES UR QL STRIP: NEGATIVE
LEUKOCYTE ESTERASE UR QL STRIP: NEGATIVE
LYMPHOCYTES # BLD AUTO: 0.4 K/UL (ref 1–4.8)
LYMPHOCYTES NFR BLD: 3.5 % (ref 18–48)
MCH RBC QN AUTO: 25.1 PG (ref 27–31)
MCHC RBC AUTO-ENTMCNC: 28.9 G/DL (ref 32–36)
MCV RBC AUTO: 87 FL (ref 82–98)
MICROSCOPIC COMMENT: NORMAL
MONOCYTES # BLD AUTO: 0.6 K/UL (ref 0.3–1)
MONOCYTES NFR BLD: 5.7 % (ref 4–15)
NEUTROPHILS # BLD AUTO: 9.3 K/UL (ref 1.8–7.7)
NEUTROPHILS NFR BLD: 89.4 % (ref 38–73)
NITRITE UR QL STRIP: NEGATIVE
NRBC BLD-RTO: 0 /100 WBC
PH UR STRIP: 6 [PH] (ref 5–8)
PLATELET # BLD AUTO: 298 K/UL (ref 150–450)
PMV BLD AUTO: 8.7 FL (ref 9.2–12.9)
POTASSIUM SERPL-SCNC: 4.4 MMOL/L (ref 3.5–5.1)
PROT SERPL-MCNC: 5.8 G/DL (ref 6–8.4)
PROT UR QL STRIP: NEGATIVE
RBC # BLD AUTO: 3.23 M/UL (ref 4.6–6.2)
RBC #/AREA URNS AUTO: 0 /HPF (ref 0–4)
SODIUM SERPL-SCNC: 132 MMOL/L (ref 136–145)
SP GR UR STRIP: 1.01 (ref 1–1.03)
SQUAMOUS #/AREA URNS AUTO: 0 /HPF
URN SPEC COLLECT METH UR: ABNORMAL
WBC # BLD AUTO: 10.36 K/UL (ref 3.9–12.7)
WBC #/AREA URNS AUTO: 1 /HPF (ref 0–5)
YEAST UR QL AUTO: NORMAL

## 2023-11-14 PROCEDURE — 87088 URINE BACTERIA CULTURE: CPT | Performed by: HOSPITALIST

## 2023-11-14 PROCEDURE — 85025 COMPLETE CBC W/AUTO DIFF WBC: CPT | Performed by: HOSPITALIST

## 2023-11-14 PROCEDURE — 87077 CULTURE AEROBIC IDENTIFY: CPT | Performed by: HOSPITALIST

## 2023-11-14 PROCEDURE — 81001 URINALYSIS AUTO W/SCOPE: CPT | Performed by: HOSPITALIST

## 2023-11-14 PROCEDURE — 87086 URINE CULTURE/COLONY COUNT: CPT | Performed by: HOSPITALIST

## 2023-11-14 PROCEDURE — 80053 COMPREHEN METABOLIC PANEL: CPT | Performed by: HOSPITALIST

## 2023-11-14 PROCEDURE — 87186 SC STD MICRODIL/AGAR DIL: CPT | Performed by: HOSPITALIST

## 2023-11-15 ENCOUNTER — PATIENT MESSAGE (OUTPATIENT)
Dept: CASE MANAGEMENT | Facility: HOSPITAL | Age: 73
End: 2023-11-15
Payer: COMMERCIAL

## 2023-11-15 RX ORDER — CIPROFLOXACIN 500 MG/1
500 TABLET ORAL 2 TIMES DAILY
Qty: 20 TABLET | Refills: 0 | Status: ON HOLD | OUTPATIENT
Start: 2023-11-15 | End: 2023-11-19 | Stop reason: HOSPADM

## 2023-11-15 NOTE — TELEPHONE ENCOUNTER
Urine cx returned with GNR, could be making sugars worse.  Start Cipro 500mg BID and send to [facility] on [address].  Son Hong informed.    Adele Morales MD, [facility]

## 2023-11-16 ENCOUNTER — HOSPITAL ENCOUNTER (OUTPATIENT)
Facility: HOSPITAL | Age: 73
Discharge: HOME | End: 2023-11-19
Attending: STUDENT IN AN ORGANIZED HEALTH CARE EDUCATION/TRAINING PROGRAM | Admitting: STUDENT IN AN ORGANIZED HEALTH CARE EDUCATION/TRAINING PROGRAM
Payer: COMMERCIAL

## 2023-11-16 DIAGNOSIS — G93.41 ENCEPHALOPATHY, METABOLIC: Primary | ICD-10-CM

## 2023-11-16 DIAGNOSIS — R41.82 AMS (ALTERED MENTAL STATUS): ICD-10-CM

## 2023-11-16 PROBLEM — R73.9 STEROID-INDUCED HYPERGLYCEMIA: Status: ACTIVE | Noted: 2023-11-16

## 2023-11-16 PROBLEM — E83.52 HYPERCALCEMIA OF MALIGNANCY: Status: ACTIVE | Noted: 2023-11-16

## 2023-11-16 PROBLEM — T38.0X5A STEROID-INDUCED HYPERGLYCEMIA: Status: ACTIVE | Noted: 2023-11-16

## 2023-11-16 PROBLEM — N30.00 ACUTE CYSTITIS WITHOUT HEMATURIA: Status: ACTIVE | Noted: 2023-11-16

## 2023-11-16 PROBLEM — D69.6 THROMBOCYTOPENIA: Status: ACTIVE | Noted: 2023-11-16

## 2023-11-16 LAB
ALBUMIN SERPL BCP-MCNC: 2.1 G/DL (ref 3.5–5.2)
ALP SERPL-CCNC: 174 U/L (ref 55–135)
ALT SERPL W/O P-5'-P-CCNC: 29 U/L (ref 10–44)
ANION GAP SERPL CALC-SCNC: 9 MMOL/L (ref 8–16)
AST SERPL-CCNC: 12 U/L (ref 10–40)
BACTERIA #/AREA URNS AUTO: NORMAL /HPF
BACTERIA UR CULT: ABNORMAL
BASOPHILS # BLD AUTO: 0.01 K/UL (ref 0–0.2)
BASOPHILS NFR BLD: 0.1 % (ref 0–1.9)
BILIRUB SERPL-MCNC: 0.5 MG/DL (ref 0.1–1)
BILIRUB UR QL STRIP: NEGATIVE
BUN SERPL-MCNC: 37 MG/DL (ref 8–23)
CA-I BLDV-SCNC: 1.34 MMOL/L (ref 1.06–1.42)
CALCIUM SERPL-MCNC: 10.3 MG/DL (ref 8.7–10.5)
CHLORIDE SERPL-SCNC: 100 MMOL/L (ref 95–110)
CLARITY UR REFRACT.AUTO: CLEAR
CO2 SERPL-SCNC: 25 MMOL/L (ref 23–29)
COLOR UR AUTO: YELLOW
CREAT SERPL-MCNC: 0.9 MG/DL (ref 0.5–1.4)
DIFFERENTIAL METHOD: ABNORMAL
EOSINOPHIL # BLD AUTO: 0 K/UL (ref 0–0.5)
EOSINOPHIL NFR BLD: 0 % (ref 0–8)
ERYTHROCYTE [DISTWIDTH] IN BLOOD BY AUTOMATED COUNT: 18.7 % (ref 11.5–14.5)
EST. GFR  (NO RACE VARIABLE): >60 ML/MIN/1.73 M^2
ESTIMATED AVG GLUCOSE: 226 MG/DL (ref 68–131)
GLUCOSE SERPL-MCNC: 188 MG/DL (ref 70–110)
GLUCOSE UR QL STRIP: NEGATIVE
HBA1C MFR BLD: 9.5 % (ref 4–5.6)
HCT VFR BLD AUTO: 33.9 % (ref 40–54)
HGB BLD-MCNC: 9.2 G/DL (ref 14–18)
HGB UR QL STRIP: NEGATIVE
IMM GRANULOCYTES # BLD AUTO: 0.21 K/UL (ref 0–0.04)
IMM GRANULOCYTES NFR BLD AUTO: 1.8 % (ref 0–0.5)
KETONES UR QL STRIP: NEGATIVE
LEUKOCYTE ESTERASE UR QL STRIP: NEGATIVE
LYMPHOCYTES # BLD AUTO: 0.3 K/UL (ref 1–4.8)
LYMPHOCYTES NFR BLD: 2.8 % (ref 18–48)
MAGNESIUM SERPL-MCNC: 1.8 MG/DL (ref 1.6–2.6)
MCH RBC QN AUTO: 24.4 PG (ref 27–31)
MCHC RBC AUTO-ENTMCNC: 27.1 G/DL (ref 32–36)
MCV RBC AUTO: 90 FL (ref 82–98)
MICROSCOPIC COMMENT: NORMAL
MONOCYTES # BLD AUTO: 0.4 K/UL (ref 0.3–1)
MONOCYTES NFR BLD: 3.7 % (ref 4–15)
NEUTROPHILS # BLD AUTO: 10.6 K/UL (ref 1.8–7.7)
NEUTROPHILS NFR BLD: 91.6 % (ref 38–73)
NITRITE UR QL STRIP: NEGATIVE
NRBC BLD-RTO: 0 /100 WBC
PH UR STRIP: 5 [PH] (ref 5–8)
PHOSPHATE SERPL-MCNC: 2.9 MG/DL (ref 2.7–4.5)
PLATELET # BLD AUTO: 98 K/UL (ref 150–450)
PLATELET BLD QL SMEAR: ABNORMAL
PMV BLD AUTO: 8.5 FL (ref 9.2–12.9)
POTASSIUM SERPL-SCNC: 4.8 MMOL/L (ref 3.5–5.1)
PROT SERPL-MCNC: 6.1 G/DL (ref 6–8.4)
PROT UR QL STRIP: NEGATIVE
RBC # BLD AUTO: 3.77 M/UL (ref 4.6–6.2)
RBC #/AREA URNS AUTO: 1 /HPF (ref 0–4)
SODIUM SERPL-SCNC: 134 MMOL/L (ref 136–145)
SP GR UR STRIP: 1.02 (ref 1–1.03)
URN SPEC COLLECT METH UR: NORMAL
WBC # BLD AUTO: 11.54 K/UL (ref 3.9–12.7)
WBC #/AREA URNS AUTO: 1 /HPF (ref 0–5)

## 2023-11-16 PROCEDURE — 25000003 PHARM REV CODE 250

## 2023-11-16 PROCEDURE — 63600175 PHARM REV CODE 636 W HCPCS

## 2023-11-16 PROCEDURE — 84100 ASSAY OF PHOSPHORUS: CPT

## 2023-11-16 PROCEDURE — G0378 HOSPITAL OBSERVATION PER HR: HCPCS

## 2023-11-16 PROCEDURE — 80053 COMPREHEN METABOLIC PANEL: CPT

## 2023-11-16 PROCEDURE — 99205 OFFICE O/P NEW HI 60 MIN: CPT | Mod: ,,, | Performed by: STUDENT IN AN ORGANIZED HEALTH CARE EDUCATION/TRAINING PROGRAM

## 2023-11-16 PROCEDURE — 82330 ASSAY OF CALCIUM: CPT | Performed by: STUDENT IN AN ORGANIZED HEALTH CARE EDUCATION/TRAINING PROGRAM

## 2023-11-16 PROCEDURE — 83036 HEMOGLOBIN GLYCOSYLATED A1C: CPT | Performed by: INTERNAL MEDICINE

## 2023-11-16 PROCEDURE — 81001 URINALYSIS AUTO W/SCOPE: CPT

## 2023-11-16 PROCEDURE — 96372 THER/PROPH/DIAG INJ SC/IM: CPT | Performed by: INTERNAL MEDICINE

## 2023-11-16 PROCEDURE — 99285 EMERGENCY DEPT VISIT HI MDM: CPT | Mod: 25

## 2023-11-16 PROCEDURE — 63600175 PHARM REV CODE 636 W HCPCS: Performed by: INTERNAL MEDICINE

## 2023-11-16 PROCEDURE — 85025 COMPLETE CBC W/AUTO DIFF WBC: CPT

## 2023-11-16 PROCEDURE — 96365 THER/PROPH/DIAG IV INF INIT: CPT

## 2023-11-16 PROCEDURE — 96361 HYDRATE IV INFUSION ADD-ON: CPT

## 2023-11-16 PROCEDURE — 96366 THER/PROPH/DIAG IV INF ADDON: CPT

## 2023-11-16 PROCEDURE — 82962 GLUCOSE BLOOD TEST: CPT

## 2023-11-16 PROCEDURE — 99205 PR OFFICE/OUTPT VISIT, NEW, LEVL V, 60-74 MIN: ICD-10-PCS | Mod: ,,, | Performed by: STUDENT IN AN ORGANIZED HEALTH CARE EDUCATION/TRAINING PROGRAM

## 2023-11-16 PROCEDURE — 83735 ASSAY OF MAGNESIUM: CPT

## 2023-11-16 PROCEDURE — 96367 TX/PROPH/DG ADDL SEQ IV INF: CPT

## 2023-11-16 PROCEDURE — 25000003 PHARM REV CODE 250: Performed by: INTERNAL MEDICINE

## 2023-11-16 RX ORDER — FAMOTIDINE 20 MG/1
20 TABLET, FILM COATED ORAL 2 TIMES DAILY
Status: DISCONTINUED | OUTPATIENT
Start: 2023-11-16 | End: 2023-11-18

## 2023-11-16 RX ORDER — MINERAL OIL
1 ENEMA (ML) RECTAL NIGHTLY
Status: ON HOLD | COMMUNITY
End: 2023-11-17 | Stop reason: CLARIF

## 2023-11-16 RX ORDER — INSULIN ASPART 100 [IU]/ML
5 INJECTION, SOLUTION INTRAVENOUS; SUBCUTANEOUS
Status: DISCONTINUED | OUTPATIENT
Start: 2023-11-16 | End: 2023-11-16

## 2023-11-16 RX ORDER — MULTIVITAMIN
1 TABLET ORAL DAILY
Status: ON HOLD | COMMUNITY
End: 2023-11-17 | Stop reason: CLARIF

## 2023-11-16 RX ORDER — IBUPROFEN 200 MG
16 TABLET ORAL
Status: DISCONTINUED | OUTPATIENT
Start: 2023-11-16 | End: 2023-11-18

## 2023-11-16 RX ORDER — AMOXICILLIN 250 MG
1 CAPSULE ORAL 2 TIMES DAILY
Status: DISCONTINUED | OUTPATIENT
Start: 2023-11-16 | End: 2023-11-19 | Stop reason: HOSPADM

## 2023-11-16 RX ORDER — HYDROCODONE BITARTRATE AND ACETAMINOPHEN 5; 325 MG/1; MG/1
1 TABLET ORAL EVERY 4 HOURS PRN
Status: DISCONTINUED | OUTPATIENT
Start: 2023-11-16 | End: 2023-11-19 | Stop reason: HOSPADM

## 2023-11-16 RX ORDER — DIPHENOXYLATE HYDROCHLORIDE AND ATROPINE SULFATE 2.5; .025 MG/1; MG/1
2 TABLET ORAL EVERY 6 HOURS PRN
Status: ON HOLD | COMMUNITY
Start: 2023-07-31 | End: 2023-11-17 | Stop reason: CLARIF

## 2023-11-16 RX ORDER — BENZONATATE 100 MG/1
100 CAPSULE ORAL 3 TIMES DAILY
Status: ON HOLD | COMMUNITY
Start: 2023-09-13 | End: 2023-11-17 | Stop reason: CLARIF

## 2023-11-16 RX ORDER — INSULIN ASPART 100 [IU]/ML
7 INJECTION, SOLUTION INTRAVENOUS; SUBCUTANEOUS
Status: DISCONTINUED | OUTPATIENT
Start: 2023-11-17 | End: 2023-11-17

## 2023-11-16 RX ORDER — IBUPROFEN 200 MG
24 TABLET ORAL
Status: DISCONTINUED | OUTPATIENT
Start: 2023-11-16 | End: 2023-11-18

## 2023-11-16 RX ORDER — DEXAMETHASONE 4 MG/1
TABLET ORAL
Status: ON HOLD | COMMUNITY
Start: 2023-09-28 | End: 2023-11-17 | Stop reason: CLARIF

## 2023-11-16 RX ORDER — INSULIN ASPART 100 [IU]/ML
0-5 INJECTION, SOLUTION INTRAVENOUS; SUBCUTANEOUS
Status: DISCONTINUED | OUTPATIENT
Start: 2023-11-16 | End: 2023-11-17

## 2023-11-16 RX ORDER — ENOXAPARIN SODIUM 100 MG/ML
40 INJECTION SUBCUTANEOUS EVERY 24 HOURS
Status: DISCONTINUED | OUTPATIENT
Start: 2023-11-16 | End: 2023-11-18

## 2023-11-16 RX ORDER — HYDROCODONE BITARTRATE AND ACETAMINOPHEN 5; 325 MG/1; MG/1
1 TABLET ORAL EVERY 6 HOURS PRN
Status: ON HOLD | COMMUNITY
Start: 2023-09-19 | End: 2023-11-17 | Stop reason: CLARIF

## 2023-11-16 RX ORDER — PEN NEEDLE, DIABETIC 32GX 5/32"
NEEDLE, DISPOSABLE MISCELLANEOUS
Status: ON HOLD | COMMUNITY
Start: 2023-11-11 | End: 2023-11-17 | Stop reason: CLARIF

## 2023-11-16 RX ORDER — LANCETS 33 GAUGE
EACH MISCELLANEOUS 2 TIMES DAILY
Status: ON HOLD | COMMUNITY
Start: 2023-10-18 | End: 2023-11-17 | Stop reason: CLARIF

## 2023-11-16 RX ORDER — ESOMEPRAZOLE MAGNESIUM 40 MG/1
40 CAPSULE, DELAYED RELEASE ORAL 2 TIMES DAILY
COMMUNITY

## 2023-11-16 RX ORDER — ACETAMINOPHEN 500 MG
2000 TABLET ORAL
Status: ON HOLD | COMMUNITY
End: 2023-11-17 | Stop reason: CLARIF

## 2023-11-16 RX ORDER — INSULIN ASPART 100 [IU]/ML
INJECTION, SOLUTION INTRAVENOUS; SUBCUTANEOUS
Status: ON HOLD | COMMUNITY
Start: 2023-10-19 | End: 2023-11-17 | Stop reason: CLARIF

## 2023-11-16 RX ORDER — MULTIVITAMIN
1 TABLET ORAL
Status: ON HOLD | COMMUNITY
End: 2023-11-17 | Stop reason: CLARIF

## 2023-11-16 RX ORDER — HYDROXYCHLOROQUINE SULFATE 200 MG/1
200 TABLET, FILM COATED ORAL 2 TIMES DAILY
Status: ON HOLD | COMMUNITY
Start: 2023-06-29 | End: 2023-11-17 | Stop reason: CLARIF

## 2023-11-16 RX ORDER — FLUCONAZOLE 200 MG/1
200 TABLET ORAL
Status: ON HOLD | COMMUNITY
End: 2023-11-17 | Stop reason: CLARIF

## 2023-11-16 RX ORDER — SODIUM CHLORIDE, SODIUM LACTATE, POTASSIUM CHLORIDE, CALCIUM CHLORIDE 600; 310; 30; 20 MG/100ML; MG/100ML; MG/100ML; MG/100ML
INJECTION, SOLUTION INTRAVENOUS CONTINUOUS
Status: DISCONTINUED | OUTPATIENT
Start: 2023-11-16 | End: 2023-11-17

## 2023-11-16 RX ORDER — LOPERAMIDE HCL 2 MG
2 TABLET ORAL
Status: ON HOLD | COMMUNITY
Start: 2023-07-31 | End: 2023-11-17 | Stop reason: CLARIF

## 2023-11-16 RX ORDER — ACETAMINOPHEN 325 MG/1
650 TABLET ORAL EVERY 8 HOURS PRN
Status: DISCONTINUED | OUTPATIENT
Start: 2023-11-16 | End: 2023-11-19 | Stop reason: HOSPADM

## 2023-11-16 RX ORDER — LEVOTHYROXINE SODIUM 100 UG/1
0.2 TABLET ORAL
Status: DISCONTINUED | OUTPATIENT
Start: 2023-11-17 | End: 2023-11-19 | Stop reason: HOSPADM

## 2023-11-16 RX ORDER — CABOZANTINIB 40 MG/1
TABLET ORAL
Status: ON HOLD | COMMUNITY
Start: 2023-09-18 | End: 2023-11-17 | Stop reason: CLARIF

## 2023-11-16 RX ORDER — PRAVASTATIN SODIUM 20 MG/1
20 TABLET ORAL NIGHTLY
Status: ON HOLD | COMMUNITY
Start: 2023-06-29 | End: 2023-11-17 | Stop reason: CLARIF

## 2023-11-16 RX ORDER — DEXAMETHASONE 1 MG/1
2 TABLET ORAL 2 TIMES DAILY WITH MEALS
Status: DISCONTINUED | OUTPATIENT
Start: 2023-11-16 | End: 2023-11-17

## 2023-11-16 RX ORDER — CIPROFLOXACIN 2 MG/ML
400 INJECTION, SOLUTION INTRAVENOUS ONCE
Status: COMPLETED | OUTPATIENT
Start: 2023-11-16 | End: 2023-11-16

## 2023-11-16 RX ORDER — IBUPROFEN 100 MG/5ML
1 SUSPENSION, ORAL (FINAL DOSE FORM) ORAL DAILY
Status: ON HOLD | COMMUNITY
End: 2023-11-17 | Stop reason: CLARIF

## 2023-11-16 RX ORDER — INSULIN DETEMIR 100 [IU]/ML
INJECTION, SOLUTION SUBCUTANEOUS
Status: ON HOLD | COMMUNITY
Start: 2023-11-09 | End: 2023-11-17 | Stop reason: CLARIF

## 2023-11-16 RX ORDER — ONDANSETRON 4 MG/1
TABLET, ORALLY DISINTEGRATING ORAL
COMMUNITY

## 2023-11-16 RX ORDER — GLUCAGON 1 MG
1 KIT INJECTION
Status: DISCONTINUED | OUTPATIENT
Start: 2023-11-16 | End: 2023-11-18

## 2023-11-16 RX ORDER — ICOSAPENT ETHYL 1000 MG/1
CAPSULE ORAL
Status: ON HOLD | COMMUNITY
Start: 2023-07-19 | End: 2023-11-17 | Stop reason: CLARIF

## 2023-11-16 RX ORDER — SODIUM CHLORIDE, SODIUM LACTATE, POTASSIUM CHLORIDE, CALCIUM CHLORIDE 600; 310; 30; 20 MG/100ML; MG/100ML; MG/100ML; MG/100ML
INJECTION, SOLUTION INTRAVENOUS CONTINUOUS
Status: DISCONTINUED | OUTPATIENT
Start: 2023-11-26 | End: 2023-11-16

## 2023-11-16 RX ORDER — IBUPROFEN 200 MG
200 TABLET ORAL
Status: ON HOLD | COMMUNITY
End: 2023-11-17 | Stop reason: CLARIF

## 2023-11-16 RX ORDER — CIPROFLOXACIN 500 MG/1
500 TABLET ORAL 2 TIMES DAILY
Status: DISCONTINUED | OUTPATIENT
Start: 2023-11-16 | End: 2023-11-19 | Stop reason: HOSPADM

## 2023-11-16 RX ORDER — TALC
6 POWDER (GRAM) TOPICAL NIGHTLY PRN
Status: DISCONTINUED | OUTPATIENT
Start: 2023-11-16 | End: 2023-11-19 | Stop reason: HOSPADM

## 2023-11-16 RX ORDER — SODIUM CHLORIDE 0.9 % (FLUSH) 0.9 %
10 SYRINGE (ML) INJECTION
Status: DISCONTINUED | OUTPATIENT
Start: 2023-11-16 | End: 2023-11-19 | Stop reason: HOSPADM

## 2023-11-16 RX ORDER — APREMILAST 30 MG/1
1 TABLET, FILM COATED ORAL 2 TIMES DAILY
Status: ON HOLD | COMMUNITY
End: 2023-11-17 | Stop reason: CLARIF

## 2023-11-16 RX ORDER — INSULIN ASPART 100 [IU]/ML
INJECTION, SUSPENSION SUBCUTANEOUS
Status: ON HOLD | COMMUNITY
Start: 2023-11-14 | End: 2023-11-17 | Stop reason: CLARIF

## 2023-11-16 RX ORDER — BLOOD-GLUCOSE METER
EACH MISCELLANEOUS
Status: ON HOLD | COMMUNITY
Start: 2023-10-18 | End: 2023-11-17 | Stop reason: CLARIF

## 2023-11-16 RX ORDER — DEXAMETHASONE 2 MG/1
2 TABLET ORAL 2 TIMES DAILY WITH MEALS
Status: ON HOLD | COMMUNITY
End: 2023-11-19 | Stop reason: HOSPADM

## 2023-11-16 RX ORDER — FUROSEMIDE 20 MG/1
20 TABLET ORAL
Status: ON HOLD | COMMUNITY
Start: 2023-10-04 | End: 2023-11-17 | Stop reason: CLARIF

## 2023-11-16 RX ADMIN — HYDROCODONE BITARTRATE AND ACETAMINOPHEN 1 TABLET: 5; 325 TABLET ORAL at 06:11

## 2023-11-16 RX ADMIN — CIPROFLOXACIN 400 MG: 2 INJECTION, SOLUTION INTRAVENOUS at 01:11

## 2023-11-16 RX ADMIN — DEXAMETHASONE 2 MG: 1 TABLET ORAL at 05:11

## 2023-11-16 RX ADMIN — SODIUM CHLORIDE, POTASSIUM CHLORIDE, SODIUM LACTATE AND CALCIUM CHLORIDE: 600; 310; 30; 20 INJECTION, SOLUTION INTRAVENOUS at 03:11

## 2023-11-16 RX ADMIN — ENOXAPARIN SODIUM 40 MG: 40 INJECTION SUBCUTANEOUS at 05:11

## 2023-11-16 RX ADMIN — CEFTRIAXONE 1 G: 1 INJECTION, POWDER, FOR SOLUTION INTRAMUSCULAR; INTRAVENOUS at 11:11

## 2023-11-16 RX ADMIN — SODIUM CHLORIDE, POTASSIUM CHLORIDE, SODIUM LACTATE AND CALCIUM CHLORIDE 1000 ML: 600; 310; 30; 20 INJECTION, SOLUTION INTRAVENOUS at 10:11

## 2023-11-16 NOTE — SUBJECTIVE & OBJECTIVE
"Interval HPI:   Overnight events: consult placed for hyperglycemia    PMH, PSH, FH, SH updated and reviewed     ROS:    Review of Systems   Constitutional:  Negative for activity change and unexpected weight change.   HENT:  Negative for trouble swallowing.    Endocrine: Negative for polydipsia and polyuria.   Musculoskeletal:  Negative for arthralgias and myalgias.   Neurological:  Negative for speech difficulty and weakness.   Psychiatric/Behavioral:  Positive for confusion.        Labs Reviewed and Include:  BASELINE Creatinine:     Lab Results   Component Value Date    WBC 11.54 11/16/2023    HCT 33.9 (L) 11/16/2023    HGB 9.2 (L) 11/16/2023       Recent Labs   Lab 11/16/23  1016   *   CALCIUM 10.3   ALBUMIN 2.1*   PROT 6.1   *   K 4.8   CO2 25      BUN 37*   CREATININE 0.9   ALKPHOS 174*   ALT 29   AST 12   BILITOT 0.5     Lab Results   Component Value Date    HGBA1C 9.5 (H) 11/16/2023       Nutritional status:   Body mass index is 29.95 kg/m².  Lab Results   Component Value Date    ALBUMIN 2.1 (L) 11/16/2023    ALBUMIN 1.9 (L) 11/14/2023    ALBUMIN 1.9 (L) 10/31/2023     No results found for: "PREALBUMIN"    Estimated Creatinine Clearance: 69.7 mL/min (based on SCr of 0.9 mg/dL).    POCT Glucose results:    Current Insulin Regimen:       PHYSICAL EXAMINATION:  Vitals:    11/16/23 1300   BP: (!) 124/57   Pulse: 68   Resp:    Temp:      Body mass index is 29.95 kg/m².     Physical Exam  Constitutional:       General: He is not in acute distress.  HENT:      Mouth/Throat:      Mouth: Mucous membranes are dry.   Eyes:      General: No scleral icterus.     Conjunctiva/sclera: Conjunctivae normal.   Cardiovascular:      Rate and Rhythm: Normal rate.   Pulmonary:      Effort: Pulmonary effort is normal. No respiratory distress.   Neurological:      Mental Status: He is alert.   Psychiatric:         Mood and Affect: Mood normal.         Behavior: Behavior normal.          "

## 2023-11-16 NOTE — ASSESSMENT & PLAN NOTE
Key History and Diagnostic Findings  Hyperglycemia started with initiation of dexamethasone  Now on 2g BID dexamethasone  Does have an elevated hemoglobin A1c now although would question the accuracy given approximately two months of steroid induced hyperglycemia. A1c of 5.8 in 2020 indicates potentially prediabetic  Home regimen prior to admission: L10BID, N5WM and ISS    Plan  - Weight based insulin requirements: 20 units long acting, 6-7U TIDWM  - Steroid induced hyperglycemia benefits greater from mealtime insulin adjustment.  - Increase mealtime Novolog to 7 units with meals  - Change levemir to 20 units nightly, patient is not a type 1 diabetic so he would not require BID split dosing  - Continue accuchecks ACHS and add low dose sliding scale to the order

## 2023-11-16 NOTE — ASSESSMENT & PLAN NOTE
"Patient's FSGs are uncontrolled due to hyperglycemia on current medication regimen.  Last A1c reviewed-   Lab Results   Component Value Date    HGBA1C 4.8 12/19/2022     Most recent fingerstick glucose reviewed- No results for input(s): "POCTGLUCOSE" in the last 24 hours.  Current correctional scale  Low  Maintain anti-hyperglycemic dose as follows-   Antihyperglycemics (From admission, onward)      Start     Stop Route Frequency Ordered    11/16/23 2100  insulin detemir U-100 (Levemir) pen 10 Units         -- SubQ 2 times daily 11/16/23 1341    11/16/23 1645  insulin aspart U-100 pen 5 Units         -- SubQ 3 times daily with meals 11/16/23 1341    11/16/23 1438  insulin aspart U-100 pen 0-5 Units         -- SubQ Before meals & nightly PRN 11/16/23 1341          Hold Oral hypoglycemics while patient is in the hospital.    Endocrinology consult  "

## 2023-11-16 NOTE — ASSESSMENT & PLAN NOTE
Labs from [facility]          Patients taking Sutent may have increasing levothyroxine requirements while on Sutent. He has taken this medicine in the past, although not clearly recently.  Also a question of compliance with medications at home given recent worsening of confusion and unclear if he is taking Levothyroxine appropriately.    - check TSH and FT4 in the AM  - plan to adjust levothyroxine accordingly.

## 2023-11-16 NOTE — SUBJECTIVE & OBJECTIVE
"No past medical history on file.    Past Surgical History:   Procedure Laterality Date    ENDOSCOPY OF PROXIMAL SMALL INTESTINE N/A 8/18/2022    Procedure: Upper DBE with general anesthesia;  Surgeon: Michael Silveira MD;  Location: [facility];  Service: Endoscopy;  Laterality: N/A;    RADICAL NEPHRECTOMY Right 08/18/2016    THYROIDECTOMY         Review of patient's allergies indicates:   Allergen Reactions    Iodinated contrast media Rash     Delayed non itching rash after CT scan.    Per Nsg Notes 4/30/19 - premed with Benadryl 25mg tab, and no reaction noted post CT.   8/6/19 did well with Benadryl 25 mg PO x1       No current facility-administered medications on file prior to encounter.     Current Outpatient Medications on File Prior to Encounter   Medication Sig    BD DAY 2ND GEN PEN NEEDLE 32 gauge x 5/32" Ndle USE FOUR TIMES DAILY WITH INSULIN    benzonatate (TESSALON) 100 MG capsule Take 100 mg by mouth 3 (three) times daily.    CABOMETYX 40 mg Tab     cabozantinib (CABOMETYX) 60 mg Tab     dexamethasone (DECADRON ORAL)     dexAMETHasone (DECADRON) 4 MG Tab     diphenoxylate-atropine 2.5-0.025 mg (LOMOTIL) 2.5-0.025 mg per tablet Take 2 tablets by mouth every 6 (six) hours as needed.    furosemide (LASIX) 20 MG tablet Take 20 mg by mouth.    HYDROcodone-acetaminophen (NORCO) 5-325 mg per tablet Take 1 tablet by mouth every 6 (six) hours as needed.    hydroxychloroquine (PLAQUENIL) 200 mg tablet Take 200 mg by mouth 2 (two) times daily.    icosapent ethyL (VASCEPA) 1 gram Cap     LEVEMIR FLEXPEN 100 unit/mL (3 mL) InPn pen SMARTSIG:10 Unit(s) SUB-Q Twice Daily    loperamide (IMODIUM A-D) 2 mg Tab Take 2 mg by mouth.    NOVOLOG FLEXPEN U-100 INSULIN 100 unit/mL (3 mL) InPn pen USE WITH SLIDING SCALE 4 TIMES DAILY    NOVOLOG MIX 70-30FLEXPEN U-100 100 unit/mL (70-30) InPn pen     pravastatin (PRAVACHOL) 20 MG tablet Take 20 mg by mouth every evening.    TRUE METRIX GLUCOSE METER Misc use as directed    " "TRUEPLUS LANCETS 33 gauge Misc Apply topically 2 (two) times daily.    apremilast (OTEZLA) 30 mg Tab Take 30 mg by mouth 2 (two) times daily.    apremilast (OTEZLA) 30 mg Tab Take 1 tablet by mouth 2 (two) times daily.    ascorbic acid, vitamin C, (VITAMIN C) 1000 MG tablet Take 1 tablet by mouth once daily.    blood sugar diagnostic Strp 1 strip by Misc.(Non-Drug; Combo Route) route 2 (two) times daily with meals.    blood-glucose meter kit Use as instructed    cholecalciferol, vitamin D3, (VITAMIN D3) 50 mcg (2,000 unit) Cap capsule Take 2,000 Units by mouth.    chondroitin sulfate A sodium 400 mg Cap Take 2 tablets by mouth once daily.    ciprofloxacin HCl (CIPRO) 500 MG tablet Take 1 tablet (500 mg total) by mouth 2 (two) times daily. for 10 days    crisaborole (EUCRISA) 2 % Oint Apply 1 Application topically.    dexAMETHasone (DECADRON) 2 MG tablet Take 2 tablets by mouth 2 times daily with meals.    esomeprazole (NEXIUM) 40 MG capsule Take 40 mg by mouth 2 (two) times daily.    famotidine-calcium carbonate-magnesium hydroxide (PEPCID COMPLETE) chewable tablet Take 1 tablet by mouth nightly as needed.    fexofenadine (ALLEGRA) 180 MG tablet Take 1 tablet by mouth every evening.    fluconazole (DIFLUCAN) 200 MG Tab Take 200 mg by mouth.    ibuprofen (ADVIL,MOTRIN) 200 MG tablet Take 200 mg by mouth.    insulin aspart U-100 (NOVOLOG U-100 INSULIN ASPART) 100 unit/mL injection Inject 5 Units into the skin 3 (three) times daily before meals.    insulin aspart U-100 (NOVOLOG U-100 INSULIN ASPART) 100 unit/mL injection Moderate dose: Novolog correction based on before meal glucose: 150-200: add 2 units 201-250: add 4 units 251-300: add 6 units 301-350: add 8 units >350: add 10 units    insulin detemir U-100 (LEVEMIR) 100 unit/mL injection Inject 10 Units into the skin 2 (two) times a day.    insulin syringe-needle U-100 0.3 mL 30 gauge x 5/16" Syrg 1 each by Misc.(Non-Drug; Combo Route) route 2 (two) times daily with " "meals.    irbesartan (AVAPRO) 150 MG tablet     lancets Misc 1 lancet  by Misc.(Non-Drug; Combo Route) route 2 (two) times daily with meals.    lancing device Misc 1 Device by Misc.(Non-Drug; Combo Route) route 2 (two) times daily with meals.    lenvatinib (LENVIMA) 14 mg/day(10 mg x 1-4 mg x 1) Cap Take by mouth.    lenvatinib (LENVIMA) 14 mg/day(10 mg x 1-4 mg x 1) Cap Take 14 mg by mouth daily as needed.    lenvatinib (LENVIMA) 14 mg/day(10 mg x 1-4 mg x 1) Cap Take 14 mg by mouth daily as needed.    levothyroxine (SYNTHROID) 200 MCG tablet     metformin (GLUCOPHAGE) 500 MG tablet     multivitamin (THERAGRAN) per tablet Take 1 tablet by mouth.    multivitamin (THERAGRAN) per tablet Take 1 tablet by mouth once daily.    nivolumab 100 mg/10 mL injection Inject into the vein.    nivolumab 40 mg/4 mL injection Inject into the vein.    ondansetron (ZOFRAN-ODT) 4 MG TbDL DISSOLVE 1 TABLET ON THE TONGUE EVERY 4 HOURS AS NEEDED FOR NAUSEA    pembrolizumab (KEYTRUDA IV) Inject into the vein.    pen needle, diabetic 31 gauge x 5/16" Ndle 1 each by Misc.(Non-Drug; Combo Route) route 2 (two) times daily with meals.    pravastatin (PRAVACHOL) 40 MG tablet Take 20 mg by mouth once daily. Taking 1/2 of 40 mg tablet daily      triamterene-hydrochlorothiazide 37.5-25 mg (MAXZIDE-25) 37.5-25 mg per tablet      Family History    None       Tobacco Use    Smoking status: Never    Smokeless tobacco: Not on file   Substance and Sexual Activity    Alcohol use: Not on file    Drug use: Not on file    Sexual activity: Not on file     Review of Systems   Constitutional:  Positive for activity change, appetite change, fatigue and unexpected weight change. Negative for chills, diaphoresis and fever.   HENT:  Negative for congestion and dental problem.    Eyes:  Negative for discharge.   Respiratory:  Negative for apnea, chest tightness, shortness of breath and stridor.    Cardiovascular:  Negative for chest pain and leg swelling. "   Gastrointestinal:  Negative for abdominal distention, abdominal pain, nausea and vomiting.   Genitourinary:  Positive for dysuria. Negative for difficulty urinating.   Musculoskeletal:  Negative for arthralgias and back pain.   Neurological:  Positive for weakness. Negative for dizziness and light-headedness.   Psychiatric/Behavioral:  Positive for behavioral problems and confusion. Negative for hallucinations. The patient is hyperactive.      Objective:     Vital Signs (Most Recent):  Temp: 98 °F (36.7 °C) (11/16/23 0840)  Pulse: 68 (11/16/23 1300)  Resp: 20 (11/16/23 0840)  BP: (!) 124/57 (11/16/23 1300)  SpO2: 96 % (11/16/23 1300) Vital Signs (24h Range):  Temp:  [98 °F (36.7 °C)] 98 °F (36.7 °C)  Pulse:  [66-72] 68  Resp:  [20] 20  SpO2:  [95 %-99 %] 96 %  BP: (124-128)/(57-61) 124/57     Weight: 81.6 kg (180 lb)  Body mass index is 29.95 kg/m².     Physical Exam  Constitutional:       General: He is not in acute distress.     Appearance: He is not diaphoretic.   HENT:      Head: Normocephalic and atraumatic.      Mouth/Throat:      Pharynx: No oropharyngeal exudate.   Cardiovascular:      Rate and Rhythm: Normal rate and regular rhythm.      Heart sounds: No murmur heard.  Pulmonary:      Effort: No respiratory distress.      Breath sounds: No stridor. No wheezing.   Abdominal:      General: There is no distension.      Palpations: There is no mass.      Tenderness: There is no abdominal tenderness.   Musculoskeletal:         General: No swelling or tenderness.   Skin:     Coloration: Skin is not jaundiced.   Neurological:      General: No focal deficit present.      Mental Status: He is alert. He is disoriented.      Cranial Nerves: No cranial nerve deficit.   Psychiatric:         Mood and Affect: Mood normal.                Significant Labs: All pertinent labs within the past 24 hours have been reviewed.  CBC:   Recent Labs   Lab 11/16/23  1016   WBC 11.54   HGB 9.2*   HCT 33.9*   PLT 98*     CMP:   Recent  Labs   Lab 11/16/23  1016   *   K 4.8      CO2 25   *   BUN 37*   CREATININE 0.9   CALCIUM 10.3   PROT 6.1   ALBUMIN 2.1*   BILITOT 0.5   ALKPHOS 174*   AST 12   ALT 29   ANIONGAP 9       Significant Imaging: I have reviewed all pertinent imaging results/findings within the past 24 hours.  Impression:     No acute intracranial hemorrhage or CT evidence of acute major vascular territory infarct.     Patient with known intracranial metastatic disease better demonstrated on prior MRI.  Allowing for differences in technique, no significant detrimental changes from prior study.

## 2023-11-16 NOTE — ASSESSMENT & PLAN NOTE
2016 diagnosed. Follows with Dr. Corado at [facility], recently 2nd opinion Dr. Michael at [facility].   - S/p left nephrectomy  - remission until 8/20 - pleural based nodule, bony lesions, now intracranial lesions  - recent Gamma Knife for frontal brain mass - Dr. Velazquez/ Dr. Ragland ( 11/23)  - Has had progression on Keytruda it appears.   - Unclear what options remain given concerns of performance status, GIB

## 2023-11-16 NOTE — ASSESSMENT & PLAN NOTE
"The patient has hypercalcemia that is currently uncontrolled. The patient has the following symptoms due to their hypercalcemia: weakness and encephalopathy. The hypercalcemia is likely due to Malignancy. We will obtain the following labs to work up the hypercalcemia: PTH No results found for: "PTH" . We will treat the hypercalcemia with: IV fluids ordered at a rate of 150 ml/hr. Their latest calcium has been reviewed and is listed below.  Ionized Calcium   Date Value Ref Range Status   11/16/2023 1.34 1.06 - 1.42 mmol/L Final     "

## 2023-11-16 NOTE — ASSESSMENT & PLAN NOTE
"Patient's anemia is currently controlled. Has not received any PRBCs to date. Etiology likely d/t chronic blood loss  Current CBC reviewed-   Lab Results   Component Value Date    HGB 9.2 (L) 11/16/2023    HCT 33.9 (L) 11/16/2023       No results found for: "FERRITIN"  Will check iron studies  Monitor serial CBC and transfuse if patient becomes hemodynamically unstable, symptomatic or H/H drops below 7/21.  "

## 2023-11-16 NOTE — CONSULTS
[facility]  Endocrinology  Consult Note    Consult Requested by: Jose Maria Taylor MD   Reason for admit: <principal problem not specified>    HISTORY OF PRESENT ILLNESS:  Mark Coppola is a 73 y.o. 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 Labs obtained two days ago were positive for a corrected calcium of 12.8. Patient is a poor historian owing to recent worsening of confusion potentially in the setting of UTI. I spoke with patients leonora Pitts at bedside.    Endocrinology is consulted to assist with management of hyperglycemia.    Patient was started on dexamethasone for progression of brain mets and he underwent gamma knife with Dr. Ragland on 11/10/23. Since starting steroids he has had significant hyperglycemia for which he was started on BID levemir and mealtime novolog per his primary card doctor. He has had persistently elevated blood glucose levels in the 300s at home and is wearing a DEXCOM now. Hong states that he is not sure regarding patients compliance with insulin injections at home. Patient does not think that he is taking any levemir.    Additional review of the chart shows hypothyroidism. Family at bedside report that this has not been addressed.            Medications and/or Treatments Impacting Glycemic Control:  Immunotherapy:    Immunosuppressants       None          Steroids:   Hormones (From admission, onward)      Start     Stop Route Frequency Ordered    11/16/23 1645  dexAMETHasone tablet 2 mg         -- Oral 2 times daily with meals 11/16/23 1341    11/16/23 1436  melatonin tablet 6 mg         -- Oral Nightly PRN 11/16/23 1341          Pressors:    Autonomic Drugs (From admission, onward)      None            (Not in a hospital  admission)      Current Facility-Administered Medications   Medication Dose Route Frequency Provider Last Rate Last Admin    acetaminophen tablet 650 mg  650 mg Oral Q8H PRN Jose Maria Taylor MD        ciprofloxacin HCl tablet 500 mg  500 mg Oral BID Jose Maria Taylor MD        dexAMETHasone tablet 2 mg  2 mg Oral BIDWM Jose Maria Taylor MD        dextrose 10% bolus 125 mL 125 mL  12.5 g Intravenous PRN Jose Maria Taylor MD        dextrose 10% bolus 250 mL 250 mL  25 g Intravenous PRN Jose Maria Taylor MD        enoxaparin injection 40 mg  40 mg Subcutaneous Daily Jose Maria Taylor MD        famotidine tablet 20 mg  20 mg Oral BID Jose Maria Taylor MD        glucagon (human recombinant) injection 1 mg  1 mg Intramuscular PRN Jose Maria Taylor MD        glucose chewable tablet 16 g  16 g Oral PRN Jose Maria Taylor MD        glucose chewable tablet 24 g  24 g Oral PRN Jose Maria Taylor MD        HYDROcodone-acetaminophen 5-325 mg per tablet 1 tablet  1 tablet Oral Q4H PRN Jose Maria Taylor MD        insulin aspart U-100 pen 0-5 Units  0-5 Units Subcutaneous QID (AC + HS) PRN Jose Maria Taylor MD        insulin aspart U-100 pen 0-5 Units  0-5 Units Subcutaneous QID (AC + HS) PRN Ricci Garcia DO        [START ON 11/17/2023] insulin aspart U-100 pen 7 Units  7 Units Subcutaneous TIDWM Ricci Garcia DO        insulin detemir U-100 (Levemir) pen 20 Units  20 Units Subcutaneous QHS Ricci Garcia DO        lactated ringers infusion   Intravenous Continuous Jose Maria Taylor  mL/hr at 11/16/23 1559 New Bag at 11/16/23 1559    Followed by    [START ON 11/26/2023] lactated ringers infusion   Intravenous Continuous Jose Maria Taylor MD        [START ON 11/17/2023] levothyroxine tablet 200 mcg  0.2 mg Oral Before breakfast Jose Maria Taylor MD        melatonin tablet 6 mg  6 mg Oral Nightly PRN Jose Maria Taylor MD        senna-docusate 8.6-50 mg per tablet 1 tablet  1 tablet Oral BID Jose Maria Taylor  "MD KAREN        sodium chloride 0.9% flush 10 mL  10 mL Intravenous PRN Jose Maria Taylor MD         Current Outpatient Medications   Medication Sig Dispense Refill    BD DAY 2ND GEN PEN NEEDLE 32 gauge x 5/32" Ndle USE FOUR TIMES DAILY WITH INSULIN      benzonatate (TESSALON) 100 MG capsule Take 100 mg by mouth 3 (three) times daily.      CABOMETYX 40 mg Tab       cabozantinib (CABOMETYX) 60 mg Tab       dexamethasone (DECADRON ORAL)       dexAMETHasone (DECADRON) 4 MG Tab       diphenoxylate-atropine 2.5-0.025 mg (LOMOTIL) 2.5-0.025 mg per tablet Take 2 tablets by mouth every 6 (six) hours as needed.      furosemide (LASIX) 20 MG tablet Take 20 mg by mouth.      HYDROcodone-acetaminophen (NORCO) 5-325 mg per tablet Take 1 tablet by mouth every 6 (six) hours as needed.      hydroxychloroquine (PLAQUENIL) 200 mg tablet Take 200 mg by mouth 2 (two) times daily.      icosapent ethyL (VASCEPA) 1 gram Cap       LEVEMIR FLEXPEN 100 unit/mL (3 mL) InPn pen SMARTSIG:10 Unit(s) SUB-Q Twice Daily      loperamide (IMODIUM A-D) 2 mg Tab Take 2 mg by mouth.      NOVOLOG FLEXPEN U-100 INSULIN 100 unit/mL (3 mL) InPn pen USE WITH SLIDING SCALE 4 TIMES DAILY      NOVOLOG MIX 70-30FLEXPEN U-100 100 unit/mL (70-30) InPn pen       pravastatin (PRAVACHOL) 20 MG tablet Take 20 mg by mouth every evening.      TRUE METRIX GLUCOSE METER Misc use as directed      TRUEPLUS LANCETS 33 gauge Misc Apply topically 2 (two) times daily.      apremilast (OTEZLA) 30 mg Tab Take 30 mg by mouth 2 (two) times daily.      apremilast (OTEZLA) 30 mg Tab Take 1 tablet by mouth 2 (two) times daily.      ascorbic acid, vitamin C, (VITAMIN C) 1000 MG tablet Take 1 tablet by mouth once daily.      blood sugar diagnostic Strp 1 strip by Misc.(Non-Drug; Combo Route) route 2 (two) times daily with meals. 100 strip 11    blood-glucose meter kit Use as instructed 1 each 0    cholecalciferol, vitamin D3, (VITAMIN D3) 50 mcg (2,000 unit) Cap capsule Take 2,000 Units " "by mouth.      chondroitin sulfate A sodium 400 mg Cap Take 2 tablets by mouth once daily.      ciprofloxacin HCl (CIPRO) 500 MG tablet Take 1 tablet (500 mg total) by mouth 2 (two) times daily. for 10 days 20 tablet 0    crisaborole (EUCRISA) 2 % Oint Apply 1 Application topically.      dexAMETHasone (DECADRON) 2 MG tablet Take 2 tablets by mouth 2 times daily with meals.      esomeprazole (NEXIUM) 40 MG capsule Take 40 mg by mouth 2 (two) times daily.      famotidine-calcium carbonate-magnesium hydroxide (PEPCID COMPLETE) chewable tablet Take 1 tablet by mouth nightly as needed.      fexofenadine (ALLEGRA) 180 MG tablet Take 1 tablet by mouth every evening.      fluconazole (DIFLUCAN) 200 MG Tab Take 200 mg by mouth.      ibuprofen (ADVIL,MOTRIN) 200 MG tablet Take 200 mg by mouth.      insulin aspart U-100 (NOVOLOG U-100 INSULIN ASPART) 100 unit/mL injection Inject 5 Units into the skin 3 (three) times daily before meals. 13.5 mL 0    insulin aspart U-100 (NOVOLOG U-100 INSULIN ASPART) 100 unit/mL injection Moderate dose: Novolog correction based on before meal glucose: 150-200: add 2 units 201-250: add 4 units 251-300: add 6 units 301-350: add 8 units >350: add 10 units      insulin detemir U-100 (LEVEMIR) 100 unit/mL injection Inject 10 Units into the skin 2 (two) times a day. 18 mL 0    insulin syringe-needle U-100 0.3 mL 30 gauge x 5/16" Syrg 1 each by Misc.(Non-Drug; Combo Route) route 2 (two) times daily with meals. 100 each 11    irbesartan (AVAPRO) 150 MG tablet   0    lancets Misc 1 lancet  by Misc.(Non-Drug; Combo Route) route 2 (two) times daily with meals. 100 each 11    lancing device Misc 1 Device by Misc.(Non-Drug; Combo Route) route 2 (two) times daily with meals. 1 each 0    lenvatinib (LENVIMA) 14 mg/day(10 mg x 1-4 mg x 1) Cap Take by mouth.      lenvatinib (LENVIMA) 14 mg/day(10 mg x 1-4 mg x 1) Cap Take 14 mg by mouth daily as needed.      lenvatinib (LENVIMA) 14 mg/day(10 mg x 1-4 mg x 1) " "Cap Take 14 mg by mouth daily as needed.      levothyroxine (SYNTHROID) 200 MCG tablet   0    metformin (GLUCOPHAGE) 500 MG tablet   0    multivitamin (THERAGRAN) per tablet Take 1 tablet by mouth.      multivitamin (THERAGRAN) per tablet Take 1 tablet by mouth once daily.      nivolumab 100 mg/10 mL injection Inject into the vein.      nivolumab 40 mg/4 mL injection Inject into the vein.      ondansetron (ZOFRAN-ODT) 4 MG TbDL DISSOLVE 1 TABLET ON THE TONGUE EVERY 4 HOURS AS NEEDED FOR NAUSEA      pembrolizumab (KEYTRUDA IV) Inject into the vein.      pen needle, diabetic 31 gauge x 5/16" Ndle 1 each by Misc.(Non-Drug; Combo Route) route 2 (two) times daily with meals. 100 each 11    pravastatin (PRAVACHOL) 40 MG tablet Take 20 mg by mouth once daily. Taking 1/2 of 40 mg tablet daily    0    triamterene-hydrochlorothiazide 37.5-25 mg (MAXZIDE-25) 37.5-25 mg per tablet   0       Interval HPI:   Overnight events: consult placed for hyperglycemia    PMH, PSH, FH, SH updated and reviewed     ROS:    Review of Systems   Constitutional:  Negative for activity change and unexpected weight change.   HENT:  Negative for trouble swallowing.    Endocrine: Negative for polydipsia and polyuria.   Musculoskeletal:  Negative for arthralgias and myalgias.   Neurological:  Negative for speech difficulty and weakness.   Psychiatric/Behavioral:  Positive for confusion.        Labs Reviewed and Include:  BASELINE Creatinine:     Lab Results   Component Value Date    WBC 11.54 11/16/2023    HCT 33.9 (L) 11/16/2023    HGB 9.2 (L) 11/16/2023       Recent Labs   Lab 11/16/23  1016   *   CALCIUM 10.3   ALBUMIN 2.1*   PROT 6.1   *   K 4.8   CO2 25      BUN 37*   CREATININE 0.9   ALKPHOS 174*   ALT 29   AST 12   BILITOT 0.5     Lab Results   Component Value Date    HGBA1C 9.5 (H) 11/16/2023       Nutritional status:   Body mass index is 29.95 kg/m².  Lab Results   Component Value Date    ALBUMIN 2.1 (L) 11/16/2023    " "ALBUMIN 1.9 (L) 11/14/2023    ALBUMIN 1.9 (L) 10/31/2023     No results found for: "PREALBUMIN"    Estimated Creatinine Clearance: 69.7 mL/min (based on SCr of 0.9 mg/dL).    POCT Glucose results:    Current Insulin Regimen:       PHYSICAL EXAMINATION:  Vitals:    11/16/23 1300   BP: (!) 124/57   Pulse: 68   Resp:    Temp:      Body mass index is 29.95 kg/m².     Physical Exam  Constitutional:       General: He is not in acute distress.  HENT:      Mouth/Throat:      Mouth: Mucous membranes are dry.   Eyes:      General: No scleral icterus.     Conjunctiva/sclera: Conjunctivae normal.   Cardiovascular:      Rate and Rhythm: Normal rate.   Pulmonary:      Effort: Pulmonary effort is normal. No respiratory distress.   Neurological:      Mental Status: He is alert.   Psychiatric:         Mood and Affect: Mood normal.         Behavior: Behavior normal.        .     ASSESSMENT and PLAN:    Renal/  Hypercalcemia of malignancy  Labs obtained in September show low PTH, Normal Vitamin D and undetectable PTHrp  Definitely not parathyroid mediated hypercalcemia  He is receiving treatment for hypercalcemia of malignancy with Xgeva per oncology, so would defer any further management of hypercalcemia of malignancy to Oncology      Endocrine  Steroid-induced hyperglycemia  Key History and Diagnostic Findings  Hyperglycemia started with initiation of dexamethasone  Now on 2g BID dexamethasone  Does have an elevated hemoglobin A1c now although would question the accuracy given approximately two months of steroid induced hyperglycemia. A1c of 5.8 in 2020 indicates potentially prediabetic  Home regimen prior to admission: L10BID, N5WM and ISS    Plan  - Weight based insulin requirements: 20 units long acting, 6-7U TIDWM  - Steroid induced hyperglycemia benefits greater from mealtime insulin adjustment.  - Increase mealtime Novolog to 7 units with meals  - Change levemir to 20 units nightly, patient is not a type 1 diabetic so he " would not require BID split dosing  - Continue accuchecks ACHS and add low dose sliding scale to the order          Postoperative hypothyroidism  Labs from [facility]          Patients taking Sutent may have increasing levothyroxine requirements while on Sutent. He has taken this medicine in the past, although not clearly recently.  Also a question of compliance with medications at home given recent worsening of confusion and unclear if he is taking Levothyroxine appropriately.    - check TSH and FT4 in the AM  - plan to adjust levothyroxine accordingly.        DISCHARGE NEEDS: will assess daily    Ricci Garcia DO  Endocrinology  [facility]

## 2023-11-16 NOTE — ED TRIAGE NOTES
Patient is a 73 year old male that presents to the ED with abnormal labs and worsening confusion. Per pt's wife pt has had some confusion and is currently being treated for UTI on cipro. Pt also had hypercalcemia when doing blood work 2 days ago. The patient himself has no complaints.

## 2023-11-16 NOTE — HPI
Mark Coppola is a 73 y.o. 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 Labs obtained two days ago were positive for a corrected calcium of 12.8. Patient is a poor historian owing to recent worsening of confusion potentially in the setting of UTI. I spoke with patients leonora Pitts at bedside.    Endocrinology is consulted to assist with management of hyperglycemia.    Patient was started on dexamethasone for progression of brain mets and he underwent gamma knife with Dr. Ragland on 11/10/23. Since starting steroids he has had significant hyperglycemia for which he was started on BID levemir and mealtime novolog per his primary card doctor. He has had persistently elevated blood glucose levels in the 300s at home and is wearing a DEXCOM now. Hong states that he is not sure regarding patients compliance with insulin injections at home. Patient does not think that he is taking any levemir.    Additional review of the chart shows hypothyroidism. Family at bedside report that this has not been addressed.

## 2023-11-16 NOTE — HPI
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 He was diagnosed in about 7/16, underwent left nephrectomy and was in remission until about 8/20 when a pleural based nodule appeared. Progression to sacroiliac lytic lesion about 5/21, developed hypoercalcemia and GIB, now on chronic transfusion needs. Recently found to have multiple scattered intracerebral lesions. Started on Dexamethasone and recently underwent gamma knife surgery of a frontal cerebral lesion (Dr. Ragland / Dr. Velazquez).     He is following with Dr. ISIDRO Michael at [facility] as a 2nd opinion for further cancer directed care. He has had most of his care at [facility] (Dr. Corado, Dr. Hinson) until now.     Most recently he was noted to have increased confusion. A UTI was suspected and Ciprofloxacin started empirically. He has known Hypercalcemia of malignancy and had received Xgeva (Denosumab) at [facility] with initial hypocalcemia, now rising Ca 2+ again.     Additionally, profound hyperglycemia had been noted since initiation of Dexamethasone with BG as high as 800mg/dl prompting dose reduction.     The pt. Has been reluctant to present to the ED for confusion but his wife had insisted and he agree to present today.     Evaluation in the ED shows new thrombocytopenia (98 from 298), [place]. Calcium of 11.8. CT Head with previously identified lesions but no new findings. Given a dose of IV Ciprofloxacin and 1L LR and admitted.     On my visit, the pt. Daughter and wife give most of the history. The pt. Is alert and awake and conversational but has tangential thoughts and goes off topic frequently.     Case discussed with Dr. Morales, family friend, who has helped manage Dr. Coppola's condition outpatient.

## 2023-11-16 NOTE — ASSESSMENT & PLAN NOTE
Likely multifactorial  Nothing to suggest complication from recent gamma knife or any new progression / bleed on this CT Head  Metabolic encephalopathy favored (hypercalcemia / UTI / Hyperglycemia).   - discussed optimizing medical mgt. With pt. And wife.   - plan for correction of Calcium, treatment of UTI and hydration.   - consider neurology evaluation if persistent findings or any new aspects arise. (Wife mentioned prior concerns for hydrocephalus, but none apparent on recent imaging).

## 2023-11-16 NOTE — ED PROVIDER NOTES
Encounter Date: 11/16/2023       History     Chief Complaint   Patient presents with    Urinary Tract Infection     On cipro for uti, had mri last Friday and might have hydrocephalus , low rbc, alt mental status increasing over time    Altered Mental Status     The history is provided by the patient, the spouse and a relative.     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  His wife states that over the past week, his mentation has decreased to the point that he struggles at times to perform simple ADLs.  His wife also mentions that she has noticed some blood in his stools, most notably yesterday evening. Additionally, he had a laboratory workup a few days ago which came back significant for hypercalcemia, for which the family hoped to be treated.  Of note, the patient was recently found to have UTI for which he is currently taking Cipro amoxicillin for the past day.  Currently the patient does not have any symptoms, and states that he is only here because his family made him come.  He denies chest pain, abdominal pain, shortness of breath, nausea, vomiting, or diarrhea.    Review of patient's allergies indicates:   Allergen Reactions    Iodinated contrast media Rash     Delayed non itching rash after CT scan.    Per Nsg Notes 4/30/19 - premed with Benadryl 25mg tab, and no reaction noted post CT.   8/6/19 did well with Benadryl 25 mg PO x1     No past medical history on file.  Past Surgical History:   Procedure Laterality Date    ENDOSCOPY OF PROXIMAL SMALL INTESTINE N/A 8/18/2022    Procedure: Upper DBE with general anesthesia;  Surgeon: Michael Silveira MD;  Location: [facility];  Service: Endoscopy;  Laterality: N/A;    RADICAL NEPHRECTOMY Right 08/18/2016    THYROIDECTOMY        No family history on file.  Social History     Tobacco Use    Smoking status: Never       Physical Exam     Initial Vitals [11/16/23 0840]   BP Pulse Resp Temp SpO2   128/61 70 20 98 °F (36.7 °C) 99 %      MAP       --         Physical Exam    Nursing note and vitals reviewed.  Constitutional: He appears well-developed. No distress.   HENT:   Head: Normocephalic and atraumatic.   Mouth/Throat: Oropharynx is clear and moist and mucous membranes are normal.   Eyes: EOM are normal. Pupils are equal, round, and reactive to light.   Neck:   Normal range of motion.  Cardiovascular:  Normal rate, regular rhythm and normal heart sounds.           No murmur heard.  Pulmonary/Chest: Breath sounds normal. No respiratory distress.   Abdominal: Abdomen is soft. He exhibits no distension. There is no abdominal tenderness.   Musculoskeletal:      Cervical back: Normal range of motion.     Neurological: He is alert and oriented to person, place, and time.   Skin: Skin is warm.   Psychiatric:   Ability to fully assess limited by the patient's current mentation         ED Course   Procedures  Labs Reviewed   CBC W/ AUTO DIFFERENTIAL - Abnormal; Notable for the following components:       Result Value    RBC 3.77 (*)     Hemoglobin 9.2 (*)     Hematocrit 33.9 (*)     MCH 24.4 (*)     MCHC 27.1 (*)     RDW 18.7 (*)     Platelets 98 (*)     MPV 8.5 (*)     Immature Granulocytes 1.8 (*)     Gran # (ANC) 10.6 (*)     Immature Grans (Abs) 0.21 (*)     Lymph # 0.3 (*)     Gran % 91.6 (*)     Lymph % 2.8 (*)     Mono % 3.7 (*)     Platelet Estimate Decreased (*)     All other components within normal limits   COMPREHENSIVE METABOLIC PANEL - Abnormal; Notable for the following components:    Sodium 134 (*)     Glucose 188 (*)     BUN 37 (*)     Albumin 2.1 (*)     Alkaline Phosphatase 174 (*)     All other components within normal limits   MAGNESIUM   CALCIUM, IONIZED   URINALYSIS, REFLEX TO URINE CULTURE          Imaging Results                CT Head Without Contrast (Final result)  Result time 11/16/23 11:51:16      Final result by Jarvis Jin MD (11/16/23 11:51:16)                   Impression:      No acute intracranial hemorrhage or CT evidence of acute major vascular territory infarct.    Patient with known intracranial metastatic disease better demonstrated on prior MRI.  Allowing for differences in technique, no significant detrimental changes from prior study.    This report was flagged in Epic as abnormal.    Electronically signed by resident: Evangelist Nickerson  Date:    11/16/2023  Time:    11:01    Electronically signed by: Jarvis Jin MD  Date:    11/16/2023  Time:    11:51               Narrative:    EXAMINATION:  CT HEAD WITHOUT CONTRAST    CLINICAL HISTORY:  Mental status change, unknown cause;    TECHNIQUE:  Low dose axial CT images obtained throughout the head without the use of intravenous contrast.  Axial, sagittal and coronal reconstructions were performed.    COMPARISON:  MRI brain 10/31/2023    FINDINGS:  Redemonstration of known hyperattenuating right frontal lesion measuring 6 mm.  The adjacent parenchyma is hypoattenuating likely reflecting surrounding edema.  Ill-defined hypodense lesion left cerebellum better visualized on prior MRI with mild surrounding edema.  No significant mass effect the 4th ventricle and no evidence of hydrocephalus.    No acute intracranial hemorrhage, or acute major vascular distribution infarct.    There are prominent ventricles and sulci reflective of generalized volume loss.    No new extra-axial blood or fluid collections.    No displaced calvarial fracture.    There is mucosal membrane thickening in the paranasal sinuses with aerated secretions and small air-fluid level in the left sphenoid sinus.  Mastoid air cells are clear.  Foreign body within the floor of the left maxillary sinus.                                       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  We will repeat labs performed 2 days prior to ensure there are no dressing changes, however given the finding of the hypercalcemia we will give the patient a L of fluids.  Given the worsening mentation, we will perform CT scan of the head.  We will also repeat his urinalysis.     Initially planned to give the patient a dose of Rocephin here in the ED for his known UTI considering that the sensitivities from the culture had still not yet come back, however the patient adamantly declined and requested only that he take the ciprofloxacin, for which we gave a dose.    The patient was found to have an improvement in his hypercalcemia, and the rest of his laboratory workup was unremarkable other than his platelets were decreased from 298-98 and he would a slight increase in his WBC count from 10.3 to 11.9.  CT of the head did not show any reciprocal changes from    Here in the ED, his mental status continued to remain altered. Dr. Morales who knows the patient personally reached out, and in coordination with her decision made to admit the patient for further evaluation.    Amount and/or Complexity of Data Reviewed  External Data Reviewed: labs and notes.     Details: Previous medical visits were reviewed to further understand the extent of the patient's past medical history.  Labs: ordered.  Radiology: ordered.    Risk  Prescription drug management.                               Clinical Impression:   Final diagnoses:  [R41.82] AMS (altered mental status)        ED Disposition Condition    Observation                 Bon Tompkins MD  Resident  11/16/23 2236

## 2023-11-16 NOTE — ASSESSMENT & PLAN NOTE
Appears reasonably well controlled currently.   Pt. Not taking any Rx. But will ask wife to bring med list

## 2023-11-16 NOTE — H&P
[facility] - Emergency Dept  [facility] Medicine  History & Physical    Patient Name: Mark Copploa  MRN: [id number]  Patient Class: OP- Observation  Admission Date: 11/16/2023  Attending Physician: Jose Maria Taylor MD   Primary Care Provider: IVAN Corado MD         Patient information was obtained from patient, relative(s), past medical records, and ER records.     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 He was diagnosed in about 7/16, underwent left nephrectomy and was in remission until about 8/20 when a pleural based nodule appeared. Progression to sacroiliac lytic lesion about 5/21, developed hypoercalcemia and GIB, now on chronic transfusion needs. Recently found to have multiple scattered intracerebral lesions. Started on Dexamethasone and recently underwent gamma knife surgery of a frontal cerebral lesion (Dr. Ragland / Dr. Velazquez).   He is following with Dr. ISIDRO Michael at [facility] as a 2nd opinion for further cancer directed care. He has had most of his care at [facility] (Dr. Corado, Dr. Hinson) until now.   Most recently he was noted to have increased confusion. A UTI was suspected and Ciprofloxacin started empirically. He has known Hypercalcemia of malignancy and had received Xgeva (Denosumab) at [facility] with initial hypocalcemia, now rising Ca 2+ again.   Additionally, profound hyperglycemia had been noted since initiation of Dexamethasone with BG as high as 800mg/dl prompting dose reduction.     The pt. Has been reluctant to present to the ED for confusion but his wife had insisted and he agree to present today.     Evaluation in the ED shows new thrombocytopenia (98 from 298), [place]. Calcium of 11.8. CT Head with previously identified lesions but no new findings. Given  "a dose of IV Ciprofloxacin and 1L LR and admitted.     On my visit, the pt. Daughter and wife give most of the history. The pt. Is alert and awake and conversational but has tangential thoughts and goes off topic frequently.     Case discussed with Dr. Morales, family friend, who has helped manage Dr. Coppola's condition outpatient.     No past medical history on file.    Past Surgical History:   Procedure Laterality Date    ENDOSCOPY OF PROXIMAL SMALL INTESTINE N/A 8/18/2022    Procedure: Upper DBE with general anesthesia;  Surgeon: Michael Silveira MD;  Location: [facility];  Service: Endoscopy;  Laterality: N/A;    RADICAL NEPHRECTOMY Right 08/18/2016    THYROIDECTOMY         Review of patient's allergies indicates:   Allergen Reactions    Iodinated contrast media Rash     Delayed non itching rash after CT scan.    Per Nsg Notes 4/30/19 - premed with Benadryl 25mg tab, and no reaction noted post CT.   8/6/19 did well with Benadryl 25 mg PO x1       No current facility-administered medications on file prior to encounter.     Current Outpatient Medications on File Prior to Encounter   Medication Sig    BD DAY 2ND GEN PEN NEEDLE 32 gauge x 5/32" Ndle USE FOUR TIMES DAILY WITH INSULIN    benzonatate (TESSALON) 100 MG capsule Take 100 mg by mouth 3 (three) times daily.    CABOMETYX 40 mg Tab     cabozantinib (CABOMETYX) 60 mg Tab     dexamethasone (DECADRON ORAL)     dexAMETHasone (DECADRON) 4 MG Tab     diphenoxylate-atropine 2.5-0.025 mg (LOMOTIL) 2.5-0.025 mg per tablet Take 2 tablets by mouth every 6 (six) hours as needed.    furosemide (LASIX) 20 MG tablet Take 20 mg by mouth.    HYDROcodone-acetaminophen (NORCO) 5-325 mg per tablet Take 1 tablet by mouth every 6 (six) hours as needed.    hydroxychloroquine (PLAQUENIL) 200 mg tablet Take 200 mg by mouth 2 (two) times daily.    icosapent ethyL (VASCEPA) 1 gram Cap     LEVEMIR FLEXPEN 100 unit/mL (3 mL) InPn pen SMARTSIG:10 Unit(s) SUB-Q Twice Daily    loperamide " (IMODIUM A-D) 2 mg Tab Take 2 mg by mouth.    NOVOLOG FLEXPEN U-100 INSULIN 100 unit/mL (3 mL) InPn pen USE WITH SLIDING SCALE 4 TIMES DAILY    NOVOLOG MIX 70-30FLEXPEN U-100 100 unit/mL (70-30) InPn pen     pravastatin (PRAVACHOL) 20 MG tablet Take 20 mg by mouth every evening.    TRUE METRIX GLUCOSE METER Misc use as directed    TRUEPLUS LANCETS 33 gauge Misc Apply topically 2 (two) times daily.    apremilast (OTEZLA) 30 mg Tab Take 30 mg by mouth 2 (two) times daily.    apremilast (OTEZLA) 30 mg Tab Take 1 tablet by mouth 2 (two) times daily.    ascorbic acid, vitamin C, (VITAMIN C) 1000 MG tablet Take 1 tablet by mouth once daily.    blood sugar diagnostic Strp 1 strip by Misc.(Non-Drug; Combo Route) route 2 (two) times daily with meals.    blood-glucose meter kit Use as instructed    cholecalciferol, vitamin D3, (VITAMIN D3) 50 mcg (2,000 unit) Cap capsule Take 2,000 Units by mouth.    chondroitin sulfate A sodium 400 mg Cap Take 2 tablets by mouth once daily.    ciprofloxacin HCl (CIPRO) 500 MG tablet Take 1 tablet (500 mg total) by mouth 2 (two) times daily. for 10 days    crisaborole (EUCRISA) 2 % Oint Apply 1 Application topically.    dexAMETHasone (DECADRON) 2 MG tablet Take 2 tablets by mouth 2 times daily with meals.    esomeprazole (NEXIUM) 40 MG capsule Take 40 mg by mouth 2 (two) times daily.    famotidine-calcium carbonate-magnesium hydroxide (PEPCID COMPLETE) chewable tablet Take 1 tablet by mouth nightly as needed.    fexofenadine (ALLEGRA) 180 MG tablet Take 1 tablet by mouth every evening.    fluconazole (DIFLUCAN) 200 MG Tab Take 200 mg by mouth.    ibuprofen (ADVIL,MOTRIN) 200 MG tablet Take 200 mg by mouth.    insulin aspart U-100 (NOVOLOG U-100 INSULIN ASPART) 100 unit/mL injection Inject 5 Units into the skin 3 (three) times daily before meals.    insulin aspart U-100 (NOVOLOG U-100 INSULIN ASPART) 100 unit/mL injection Moderate dose: Novolog correction based on before meal glucose: 150-200:  "add 2 units 201-250: add 4 units 251-300: add 6 units 301-350: add 8 units >350: add 10 units    insulin detemir U-100 (LEVEMIR) 100 unit/mL injection Inject 10 Units into the skin 2 (two) times a day.    insulin syringe-needle U-100 0.3 mL 30 gauge x 5/16" Syrg 1 each by Misc.(Non-Drug; Combo Route) route 2 (two) times daily with meals.    irbesartan (AVAPRO) 150 MG tablet     lancets Misc 1 lancet  by Misc.(Non-Drug; Combo Route) route 2 (two) times daily with meals.    lancing device Misc 1 Device by Misc.(Non-Drug; Combo Route) route 2 (two) times daily with meals.    lenvatinib (LENVIMA) 14 mg/day(10 mg x 1-4 mg x 1) Cap Take by mouth.    lenvatinib (LENVIMA) 14 mg/day(10 mg x 1-4 mg x 1) Cap Take 14 mg by mouth daily as needed.    lenvatinib (LENVIMA) 14 mg/day(10 mg x 1-4 mg x 1) Cap Take 14 mg by mouth daily as needed.    levothyroxine (SYNTHROID) 200 MCG tablet     metformin (GLUCOPHAGE) 500 MG tablet     multivitamin (THERAGRAN) per tablet Take 1 tablet by mouth.    multivitamin (THERAGRAN) per tablet Take 1 tablet by mouth once daily.    nivolumab 100 mg/10 mL injection Inject into the vein.    nivolumab 40 mg/4 mL injection Inject into the vein.    ondansetron (ZOFRAN-ODT) 4 MG TbDL DISSOLVE 1 TABLET ON THE TONGUE EVERY 4 HOURS AS NEEDED FOR NAUSEA    pembrolizumab (KEYTRUDA IV) Inject into the vein.    pen needle, diabetic 31 gauge x 5/16" Ndle 1 each by Misc.(Non-Drug; Combo Route) route 2 (two) times daily with meals.    pravastatin (PRAVACHOL) 40 MG tablet Take 20 mg by mouth once daily. Taking 1/2 of 40 mg tablet daily      triamterene-hydrochlorothiazide 37.5-25 mg (MAXZIDE-25) 37.5-25 mg per tablet      Family History    None       Tobacco Use    Smoking status: Never    Smokeless tobacco: Not on file   Substance and Sexual Activity    Alcohol use: Not on file    Drug use: Not on file    Sexual activity: Not on file     Review of Systems   Constitutional:  Positive for activity change, appetite " change, fatigue and unexpected weight change. Negative for chills, diaphoresis and fever.   HENT:  Negative for congestion and dental problem.    Eyes:  Negative for discharge.   Respiratory:  Negative for apnea, chest tightness, shortness of breath and stridor.    Cardiovascular:  Negative for chest pain and leg swelling.   Gastrointestinal:  Negative for abdominal distention, abdominal pain, nausea and vomiting.   Genitourinary:  Positive for dysuria. Negative for difficulty urinating.   Musculoskeletal:  Negative for arthralgias and back pain.   Neurological:  Positive for weakness. Negative for dizziness and light-headedness.   Psychiatric/Behavioral:  Positive for behavioral problems and confusion. Negative for hallucinations. The patient is hyperactive.      Objective:     Vital Signs (Most Recent):  Temp: 98 °F (36.7 °C) (11/16/23 0840)  Pulse: 68 (11/16/23 1300)  Resp: 20 (11/16/23 0840)  BP: (!) 124/57 (11/16/23 1300)  SpO2: 96 % (11/16/23 1300) Vital Signs (24h Range):  Temp:  [98 °F (36.7 °C)] 98 °F (36.7 °C)  Pulse:  [66-72] 68  Resp:  [20] 20  SpO2:  [95 %-99 %] 96 %  BP: (124-128)/(57-61) 124/57     Weight: 81.6 kg (180 lb)  Body mass index is 29.95 kg/m².     Physical Exam  Constitutional:       General: He is not in acute distress.     Appearance: He is not diaphoretic.   HENT:      Head: Normocephalic and atraumatic.      Mouth/Throat:      Pharynx: No oropharyngeal exudate.   Cardiovascular:      Rate and Rhythm: Normal rate and regular rhythm.      Heart sounds: No murmur heard.  Pulmonary:      Effort: No respiratory distress.      Breath sounds: No stridor. No wheezing.   Abdominal:      General: There is no distension.      Palpations: There is no mass.      Tenderness: There is no abdominal tenderness.   Musculoskeletal:         General: No swelling or tenderness.   Skin:     Coloration: Skin is not jaundiced.   Neurological:      General: No focal deficit present.      Mental Status: He is  alert. He is disoriented.      Cranial Nerves: No cranial nerve deficit.   Psychiatric:         Mood and Affect: Mood normal.                Significant Labs: All pertinent labs within the past 24 hours have been reviewed.  CBC:   Recent Labs   Lab 11/16/23  1016   WBC 11.54   HGB 9.2*   HCT 33.9*   PLT 98*     CMP:   Recent Labs   Lab 11/16/23  1016   *   K 4.8      CO2 25   *   BUN 37*   CREATININE 0.9   CALCIUM 10.3   PROT 6.1   ALBUMIN 2.1*   BILITOT 0.5   ALKPHOS 174*   AST 12   ALT 29   ANIONGAP 9       Significant Imaging: I have reviewed all pertinent imaging results/findings within the past 24 hours.  Impression:     No acute intracranial hemorrhage or CT evidence of acute major vascular territory infarct.     Patient with known intracranial metastatic disease better demonstrated on prior MRI.  Allowing for differences in technique, no significant detrimental changes from prior study.    Assessment/Plan:     Metastatic renal cell carcinoma  2016 diagnosed. Follows with Dr. Corado at [facility], recently 2nd opinion Dr. Michael at [facility].   - S/p left nephrectomy  - remission until 8/20 - pleural based nodule, bony lesions, now intracranial lesions  - recent Gamma Knife for frontal brain mass - Dr. Velazquez/ Dr. Ragland ( 11/23)  - Has had progression on Keytruda it appears.   - Unclear what options remain given concerns of performance status, GIB      Thrombocytopenia  - Cause unclear, but new finding.   - suspect prior CTX related?   - will monitor for now. If any significant changes will consult hematology.         Encephalopathy, metabolic  Likely multifactorial  Nothing to suggest complication from recent gamma knife or any new progression / bleed on this CT Head  Metabolic encephalopathy favored (hypercalcemia / UTI / Hyperglycemia).   - discussed optimizing medical mgt. With pt. And wife.   - plan for correction of Calcium, treatment of UTI and hydration.   - consider neurology evaluation if  "persistent findings or any new aspects arise. (Wife mentioned prior concerns for hydrocephalus, but none apparent on recent imaging).       Acute cystitis without hematuria  - complained about dysuria outpatient and started on Cipro empirically.   - UA appears benign, but UCXshows GNR 10-50k. ID and sensitifity pending.   - Continue empiric cipro - follow culture results.   - IV hydration      Type 2 diabetes mellitus without complication  Patient's FSGs are uncontrolled due to hyperglycemia on current medication regimen.  Last A1c reviewed-   Lab Results   Component Value Date    HGBA1C 4.8 12/19/2022     Most recent fingerstick glucose reviewed- No results for input(s): "POCTGLUCOSE" in the last 24 hours.  Current correctional scale  Low  Maintain anti-hyperglycemic dose as follows-   Antihyperglycemics (From admission, onward)      Start     Stop Route Frequency Ordered    11/16/23 2100  insulin detemir U-100 (Levemir) pen 10 Units         -- SubQ 2 times daily 11/16/23 1341    11/16/23 1645  insulin aspart U-100 pen 5 Units         -- SubQ 3 times daily with meals 11/16/23 1341    11/16/23 1438  insulin aspart U-100 pen 0-5 Units         -- SubQ Before meals & nightly PRN 11/16/23 1341          Hold Oral hypoglycemics while patient is in the hospital.    Endocrinology consult    Essential hypertension  Appears reasonably well controlled currently.   Pt. Not taking any Rx. But will ask wife to bring med list      Hypercalcemia of malignancy  The patient has hypercalcemia that is currently uncontrolled. The patient has the following symptoms due to their hypercalcemia: weakness and encephalopathy. The hypercalcemia is likely due to Malignancy. We will obtain the following labs to work up the hypercalcemia: PTH No results found for: "PTH" . We will treat the hypercalcemia with: IV fluids ordered at a rate of 150 ml/hr. Their latest calcium has been reviewed and is listed below.  Ionized Calcium   Date Value Ref " "Range Status   11/16/2023 1.34 1.06 - 1.42 mmol/L Final       Anemia  Patient's anemia is currently controlled. Has not received any PRBCs to date. Etiology likely d/t chronic blood loss  Current CBC reviewed-   Lab Results   Component Value Date    HGB 9.2 (L) 11/16/2023    HCT 33.9 (L) 11/16/2023       No results found for: "FERRITIN"  Will check iron studies  Monitor serial CBC and transfuse if patient becomes hemodynamically unstable, symptomatic or H/H drops below 7/21.      VTE Risk Mitigation (From admission, onward)           Ordered     enoxaparin injection 40 mg  Daily         11/16/23 1341     IP VTE HIGH RISK PATIENT  Once         11/16/23 1341     Place sequential compression device  Until discontinued         11/16/23 1341                       On 11/16/2023, patient should be placed in hospital observation services under my care.            Jose Maria Taylor MD  Department of Hospital Medicine  [facility] - Emergency Dept    N/A  Family history is reviewed and has not changed   Pertinent information:        "

## 2023-11-16 NOTE — ASSESSMENT & PLAN NOTE
- Cause unclear, but new finding.   - suspect prior CTX related?   - will monitor for now. If any significant changes will consult hematology.

## 2023-11-16 NOTE — ASSESSMENT & PLAN NOTE
- complained about dysuria outpatient and started on Cipro empirically.   - UA appears benign, but UCXshows GNR 10-50k. ID and sensitifity pending.   - Continue empiric cipro - follow culture results.   - IV hydration

## 2023-11-16 NOTE — ASSESSMENT & PLAN NOTE
Labs obtained in September show low PTH, Normal Vitamin D and undetectable PTHrp  Definitely not parathyroid mediated hypercalcemia  He is receiving treatment for hypercalcemia of malignancy with Xgeva per oncology, so would defer any further management of hypercalcemia of malignancy to Oncology

## 2023-11-17 PROBLEM — E44.1 MILD MALNUTRITION: Status: ACTIVE | Noted: 2023-11-17

## 2023-11-17 LAB
ALBUMIN SERPL BCP-MCNC: 1.9 G/DL (ref 3.5–5.2)
ALP SERPL-CCNC: 155 U/L (ref 55–135)
ALT SERPL W/O P-5'-P-CCNC: 30 U/L (ref 10–44)
ANION GAP SERPL CALC-SCNC: 8 MMOL/L (ref 8–16)
ANISOCYTOSIS BLD QL SMEAR: SLIGHT
AST SERPL-CCNC: 13 U/L (ref 10–40)
BASOPHILS # BLD AUTO: 0 K/UL (ref 0–0.2)
BASOPHILS NFR BLD: 0 % (ref 0–1.9)
BILIRUB SERPL-MCNC: 0.3 MG/DL (ref 0.1–1)
BUN SERPL-MCNC: 39 MG/DL (ref 8–23)
CALCIUM SERPL-MCNC: 9.8 MG/DL (ref 8.7–10.5)
CHLORIDE SERPL-SCNC: 102 MMOL/L (ref 95–110)
CO2 SERPL-SCNC: 22 MMOL/L (ref 23–29)
CREAT SERPL-MCNC: 1 MG/DL (ref 0.5–1.4)
DIFFERENTIAL METHOD: ABNORMAL
EOSINOPHIL # BLD AUTO: 0 K/UL (ref 0–0.5)
EOSINOPHIL NFR BLD: 0 % (ref 0–8)
ERYTHROCYTE [DISTWIDTH] IN BLOOD BY AUTOMATED COUNT: 19 % (ref 11.5–14.5)
EST. GFR  (NO RACE VARIABLE): >60 ML/MIN/1.73 M^2
FERRITIN SERPL-MCNC: 446 NG/ML (ref 20–300)
GLUCOSE SERPL-MCNC: 345 MG/DL (ref 70–110)
HCT VFR BLD AUTO: 28.6 % (ref 40–54)
HGB BLD-MCNC: 7.9 G/DL (ref 14–18)
HYPOCHROMIA BLD QL SMEAR: ABNORMAL
IMM GRANULOCYTES # BLD AUTO: 0.14 K/UL (ref 0–0.04)
IMM GRANULOCYTES NFR BLD AUTO: 1.5 % (ref 0–0.5)
IRON SERPL-MCNC: 14 UG/DL (ref 45–160)
LYMPHOCYTES # BLD AUTO: 0.3 K/UL (ref 1–4.8)
LYMPHOCYTES NFR BLD: 2.8 % (ref 18–48)
MCH RBC QN AUTO: 24.8 PG (ref 27–31)
MCHC RBC AUTO-ENTMCNC: 27.6 G/DL (ref 32–36)
MCV RBC AUTO: 90 FL (ref 82–98)
MONOCYTES # BLD AUTO: 0.3 K/UL (ref 0.3–1)
MONOCYTES NFR BLD: 3.6 % (ref 4–15)
NEUTROPHILS # BLD AUTO: 8.6 K/UL (ref 1.8–7.7)
NEUTROPHILS NFR BLD: 92.1 % (ref 38–73)
NRBC BLD-RTO: 0 /100 WBC
PLATELET # BLD AUTO: 77 K/UL (ref 150–450)
PLATELET BLD QL SMEAR: ABNORMAL
PMV BLD AUTO: 10.1 FL (ref 9.2–12.9)
POCT GLUCOSE: 206 MG/DL (ref 70–110)
POCT GLUCOSE: 291 MG/DL (ref 70–110)
POCT GLUCOSE: 294 MG/DL (ref 70–110)
POCT GLUCOSE: 299 MG/DL (ref 70–110)
POCT GLUCOSE: 319 MG/DL (ref 70–110)
POCT GLUCOSE: 336 MG/DL (ref 70–110)
POLYCHROMASIA BLD QL SMEAR: ABNORMAL
POTASSIUM SERPL-SCNC: 4.4 MMOL/L (ref 3.5–5.1)
PROT SERPL-MCNC: 5.6 G/DL (ref 6–8.4)
RBC # BLD AUTO: 3.18 M/UL (ref 4.6–6.2)
SATURATED IRON: 7 % (ref 20–50)
SODIUM SERPL-SCNC: 132 MMOL/L (ref 136–145)
T4 FREE SERPL-MCNC: 1.03 NG/DL (ref 0.71–1.51)
TOTAL IRON BINDING CAPACITY: 195 UG/DL (ref 250–450)
TRANSFERRIN SERPL-MCNC: 132 MG/DL (ref 200–375)
TSH SERPL DL<=0.005 MIU/L-ACNC: 0.01 UIU/ML (ref 0.4–4)
WBC # BLD AUTO: 9.28 K/UL (ref 3.9–12.7)

## 2023-11-17 PROCEDURE — 25000003 PHARM REV CODE 250: Performed by: STUDENT IN AN ORGANIZED HEALTH CARE EDUCATION/TRAINING PROGRAM

## 2023-11-17 PROCEDURE — 63600175 PHARM REV CODE 636 W HCPCS: Performed by: STUDENT IN AN ORGANIZED HEALTH CARE EDUCATION/TRAINING PROGRAM

## 2023-11-17 PROCEDURE — 99214 PR OFFICE/OUTPT VISIT, EST, LEVL IV, 30-39 MIN: ICD-10-PCS | Mod: ,,, | Performed by: PSYCHIATRY & NEUROLOGY

## 2023-11-17 PROCEDURE — 84443 ASSAY THYROID STIM HORMONE: CPT | Performed by: STUDENT IN AN ORGANIZED HEALTH CARE EDUCATION/TRAINING PROGRAM

## 2023-11-17 PROCEDURE — 99214 PR OFFICE/OUTPT VISIT, EST, LEVL IV, 30-39 MIN: ICD-10-PCS | Mod: ,,, | Performed by: INTERNAL MEDICINE

## 2023-11-17 PROCEDURE — 96372 THER/PROPH/DIAG INJ SC/IM: CPT | Performed by: INTERNAL MEDICINE

## 2023-11-17 PROCEDURE — 99214 OFFICE O/P EST MOD 30 MIN: CPT | Mod: ,,, | Performed by: PSYCHIATRY & NEUROLOGY

## 2023-11-17 PROCEDURE — 85025 COMPLETE CBC W/AUTO DIFF WBC: CPT | Performed by: INTERNAL MEDICINE

## 2023-11-17 PROCEDURE — 84439 ASSAY OF FREE THYROXINE: CPT | Performed by: STUDENT IN AN ORGANIZED HEALTH CARE EDUCATION/TRAINING PROGRAM

## 2023-11-17 PROCEDURE — 80053 COMPREHEN METABOLIC PANEL: CPT | Performed by: INTERNAL MEDICINE

## 2023-11-17 PROCEDURE — G0378 HOSPITAL OBSERVATION PER HR: HCPCS

## 2023-11-17 PROCEDURE — 84466 ASSAY OF TRANSFERRIN: CPT | Performed by: INTERNAL MEDICINE

## 2023-11-17 PROCEDURE — 25000003 PHARM REV CODE 250: Performed by: INTERNAL MEDICINE

## 2023-11-17 PROCEDURE — 99214 OFFICE O/P EST MOD 30 MIN: CPT | Mod: ,,, | Performed by: INTERNAL MEDICINE

## 2023-11-17 PROCEDURE — 83540 ASSAY OF IRON: CPT | Performed by: INTERNAL MEDICINE

## 2023-11-17 PROCEDURE — 82728 ASSAY OF FERRITIN: CPT | Performed by: INTERNAL MEDICINE

## 2023-11-17 PROCEDURE — 36415 COLL VENOUS BLD VENIPUNCTURE: CPT | Performed by: INTERNAL MEDICINE

## 2023-11-17 PROCEDURE — 63600175 PHARM REV CODE 636 W HCPCS: Performed by: INTERNAL MEDICINE

## 2023-11-17 PROCEDURE — 96372 THER/PROPH/DIAG INJ SC/IM: CPT | Performed by: STUDENT IN AN ORGANIZED HEALTH CARE EDUCATION/TRAINING PROGRAM

## 2023-11-17 RX ORDER — DEXAMETHASONE 4 MG/1
4 TABLET ORAL EVERY 8 HOURS
Status: DISCONTINUED | OUTPATIENT
Start: 2023-11-17 | End: 2023-11-19 | Stop reason: HOSPADM

## 2023-11-17 RX ORDER — INSULIN ASPART 100 [IU]/ML
0-10 INJECTION, SOLUTION INTRAVENOUS; SUBCUTANEOUS
Status: DISCONTINUED | OUTPATIENT
Start: 2023-11-17 | End: 2023-11-18

## 2023-11-17 RX ORDER — INSULIN ASPART 100 [IU]/ML
10 INJECTION, SOLUTION INTRAVENOUS; SUBCUTANEOUS
Status: DISCONTINUED | OUTPATIENT
Start: 2023-11-17 | End: 2023-11-17

## 2023-11-17 RX ORDER — INSULIN ASPART 100 [IU]/ML
0-10 INJECTION, SOLUTION INTRAVENOUS; SUBCUTANEOUS
Status: DISCONTINUED | OUTPATIENT
Start: 2023-11-17 | End: 2023-11-17

## 2023-11-17 RX ORDER — INSULIN ASPART 100 [IU]/ML
14 INJECTION, SOLUTION INTRAVENOUS; SUBCUTANEOUS
Status: DISCONTINUED | OUTPATIENT
Start: 2023-11-17 | End: 2023-11-18

## 2023-11-17 RX ADMIN — SENNOSIDES AND DOCUSATE SODIUM 1 TABLET: 8.6; 5 TABLET ORAL at 08:11

## 2023-11-17 RX ADMIN — DEXAMETHASONE 4 MG: 4 TABLET ORAL at 09:11

## 2023-11-17 RX ADMIN — CIPROFLOXACIN HYDROCHLORIDE 500 MG: 500 TABLET, FILM COATED ORAL at 08:11

## 2023-11-17 RX ADMIN — ENOXAPARIN SODIUM 40 MG: 40 INJECTION SUBCUTANEOUS at 05:11

## 2023-11-17 RX ADMIN — INSULIN ASPART 3 UNITS: 100 INJECTION, SOLUTION INTRAVENOUS; SUBCUTANEOUS at 08:11

## 2023-11-17 RX ADMIN — INSULIN ASPART 4 UNITS: 100 INJECTION, SOLUTION INTRAVENOUS; SUBCUTANEOUS at 09:11

## 2023-11-17 RX ADMIN — CIPROFLOXACIN HYDROCHLORIDE 500 MG: 500 TABLET, FILM COATED ORAL at 12:11

## 2023-11-17 RX ADMIN — DEXAMETHASONE 2 MG: 1 TABLET ORAL at 08:11

## 2023-11-17 RX ADMIN — FAMOTIDINE 20 MG: 20 TABLET, FILM COATED ORAL at 08:11

## 2023-11-17 RX ADMIN — INSULIN ASPART 6 UNITS: 100 INJECTION, SOLUTION INTRAVENOUS; SUBCUTANEOUS at 05:11

## 2023-11-17 RX ADMIN — SENNOSIDES AND DOCUSATE SODIUM 1 TABLET: 8.6; 5 TABLET ORAL at 12:11

## 2023-11-17 RX ADMIN — INSULIN ASPART 7 UNITS: 100 INJECTION, SOLUTION INTRAVENOUS; SUBCUTANEOUS at 08:11

## 2023-11-17 RX ADMIN — INSULIN DETEMIR 20 UNITS: 100 INJECTION, SOLUTION SUBCUTANEOUS at 12:11

## 2023-11-17 RX ADMIN — INSULIN ASPART 7 UNITS: 100 INJECTION, SOLUTION INTRAVENOUS; SUBCUTANEOUS at 12:11

## 2023-11-17 RX ADMIN — INSULIN ASPART 14 UNITS: 100 INJECTION, SOLUTION INTRAVENOUS; SUBCUTANEOUS at 05:11

## 2023-11-17 RX ADMIN — LEVOTHYROXINE SODIUM 200 MCG: 100 TABLET ORAL at 05:11

## 2023-11-17 RX ADMIN — FAMOTIDINE 20 MG: 20 TABLET, FILM COATED ORAL at 12:11

## 2023-11-17 RX ADMIN — INSULIN ASPART 4 UNITS: 100 INJECTION, SOLUTION INTRAVENOUS; SUBCUTANEOUS at 12:11

## 2023-11-17 NOTE — PLAN OF CARE
Recommendations    1. Continue Diabetic diet      2. If PO intake <50%, add Boost glucose control TID      3. RD to monitor and follow    Goals: Meet % EEN, EPN by RD f/u  Nutrition Goal Status: new  Communication of RD Recs:  (POC)

## 2023-11-17 NOTE — ASSESSMENT & PLAN NOTE
Patient's FSGs are uncontrolled due to hyperglycemia on current medication regimen.  Last A1c reviewed-   Lab Results   Component Value Date    HGBA1C 9.5 (H) 11/16/2023     Most recent fingerstick glucose reviewed-   Recent Labs   Lab 11/16/23  1544 11/17/23  0009 11/17/23  0807   POCTGLUCOSE 291* 336* 294*     Current correctional scale  Low  Maintain anti-hyperglycemic dose as follows-   Antihyperglycemics (From admission, onward)      Start     Stop Route Frequency Ordered    11/17/23 1020  insulin aspart U-100 pen 0-10 Units         -- SubQ Before meals & nightly PRN 11/17/23 0921    11/17/23 0715  insulin aspart U-100 pen 7 Units         -- SubQ 3 times daily with meals 11/16/23 1700    11/16/23 2100  insulin detemir U-100 (Levemir) pen 20 Units         -- SubQ Nightly 11/16/23 1700          Hold Oral hypoglycemics while patient is in the hospital.    Endocrinology consult appreciated, insulin Rx. Adjusted.   Remains hyperglycemic today. Endocrine to adjust Rx.

## 2023-11-17 NOTE — SUBJECTIVE & OBJECTIVE
Interval History: no new events over night. BG remains elevated but improved overall. Awake, alert and reading the paper. However keeps talking about topics he has seen on TV this morning rather than engaging in medical discussion.   Daughter at the bedside.     Review of Systems   Constitutional:  Negative for activity change and appetite change.   HENT:  Negative for congestion.    Respiratory:  Negative for apnea and chest tightness.    Cardiovascular:  Negative for chest pain.   Gastrointestinal:  Negative for abdominal distention.   Genitourinary:  Negative for difficulty urinating and dysuria.   Musculoskeletal:  Negative for arthralgias and back pain.   Neurological:  Negative for dizziness.   Hematological:  Negative for adenopathy.   Psychiatric/Behavioral:  Positive for behavioral problems.      Objective:     Vital Signs (Most Recent):  Temp: 97.2 °F (36.2 °C) (11/17/23 0452)  Pulse: (!) 59 (11/17/23 0452)  Resp: 20 (11/17/23 0452)  BP: (!) 144/65 (11/17/23 0452)  SpO2: (!) 94 % (11/17/23 0452) Vital Signs (24h Range):  Temp:  [97.2 °F (36.2 °C)-98.3 °F (36.8 °C)] 97.2 °F (36.2 °C)  Pulse:  [59-72] 59  Resp:  [16-20] 20  SpO2:  [94 %-97 %] 94 %  BP: (113-144)/(55-65) 144/65     Weight: 75.5 kg (166 lb 7.2 oz)  Body mass index is 27.7 kg/m².    Intake/Output Summary (Last 24 hours) at 11/17/2023 1149  Last data filed at 11/17/2023 0438  Gross per 24 hour   Intake 1482.5 ml   Output 500 ml   Net 982.5 ml         Physical Exam  Constitutional:       General: He is not in acute distress.     Appearance: He is not ill-appearing.   Cardiovascular:      Rate and Rhythm: Normal rate and regular rhythm.      Heart sounds: No murmur heard.  Pulmonary:      Effort: No respiratory distress.   Abdominal:      General: There is no distension.      Palpations: There is no mass.   Musculoskeletal:         General: No swelling.   Skin:     Coloration: Skin is not jaundiced.   Neurological:      General: No focal deficit  present.      Mental Status: He is alert.      Cranial Nerves: No cranial nerve deficit.             Significant Labs: All pertinent labs within the past 24 hours have been reviewed.  CBC:   Recent Labs   Lab 11/16/23  1016 11/17/23  0338   WBC 11.54 9.28   HGB 9.2* 7.9*   HCT 33.9* 28.6*   PLT 98* 77*     CMP:   Recent Labs   Lab 11/16/23  1016 11/17/23  0338   * 132*   K 4.8 4.4    102   CO2 25 22*   * 345*   BUN 37* 39*   CREATININE 0.9 1.0   CALCIUM 10.3 9.8   PROT 6.1 5.6*   ALBUMIN 2.1* 1.9*   BILITOT 0.5 0.3   ALKPHOS 174* 155*   AST 12 13   ALT 29 30   ANIONGAP 9 8       Significant Imaging: I have reviewed all pertinent imaging results/findings within the past 24 hours.

## 2023-11-17 NOTE — ASSESSMENT & PLAN NOTE
- Cause unclear, but new finding.   - suspect prior oncologic therapy related?   - slight decline with IV hydration but feel stable.

## 2023-11-17 NOTE — ASSESSMENT & PLAN NOTE
Key History and Diagnostic Findings  Hyperglycemia started with initiation of dexamethasone  Now on 2g BID dexamethasone  Does have an elevated hemoglobin A1c now although would question the accuracy given approximately two months of steroid induced hyperglycemia. A1c of 5.8 in 2020 indicates potentially prediabetic  Home regimen prior to admission: L10BID, N5WM and ISS  Weight based insulin requirements: 20 units long acting, 6-7U TIDWM    Plan  Neurosurgery started pulse dose steroids increasing Dexamethasone from 2g BID to 4g Q8. Anticipate increased blood glucose and insulin requirements. Steroid induced hyperglycemia benefits greater from mealtime insulin adjustment. Insulin adjustments as follows:  - Increased Novolog to 14 units with meals   - Continue levemir to 22 units nightly   - Increased accuchecks to ACHS + 2AM and increase to high dose sliding scale (from medium dose)

## 2023-11-17 NOTE — PROGRESS NOTES
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 He was diagnosed in about 7/16, underwent left nephrectomy and was in remission until about 8/20 when a pleural based nodule appeared. Progression to sacroiliac lytic lesion about 5/21, developed hypoercalcemia and GIB, now on chronic transfusion needs. Recently found to have multiple scattered intracerebral lesions. Started on Dexamethasone and recently underwent gamma knife surgery of a frontal cerebral lesion (Dr. Ragland / Dr. Velazquez).   He is following with Dr. ISIDRO Michael at [facility] as a 2nd opinion for further cancer directed care. He has had most of his care at [facility] (Dr. Corado, Dr. Hinson) until now.   Most recently he was noted to have increased confusion. A UTI was suspected and Ciprofloxacin started empirically. He has known Hypercalcemia of malignancy and had received Xgeva (Denosumab) at [facility] with initial hypocalcemia, now rising Ca 2+ again.   Additionally, profound hyperglycemia had been noted since initiation of Dexamethasone with BG as high as 800mg/dl prompting dose reduction.     The pt. Has been reluctant to present to the ED for confusion but his wife had insisted and he agree to present today.     Evaluation in the ED shows new thrombocytopenia (98 from 298), [place]. Calcium of 11.8. CT Head with previously identified lesions but no new findings. Given a dose of IV Ciprofloxacin and 1L LR and admitted.     On my visit, the pt. Daughter and wife give most of the history. The pt. Is alert and awake and  conversational but has tangential thoughts and goes off topic frequently.     Case discussed with Dr. Morales, family friend, who has helped manage DrTamica Coppola's condition outpatient.     Overview/Hospital Course:  Admitted for hypercalcemia, UTI and AMS.   CA improved with IVF over night. UTI mild and has been on appropriate ABX (Cipro) outpatient as evident by culture and sens.   Continues with difficult to control BG (endocrinology following).   Continues with mild tangential thoughts and confusion today.     Interval History: no new events over night. BG remains elevated but improved overall. Awake, alert and reading the paper. However keeps talking about topics he has seen on TV this morning rather than engaging in medical discussion.   Daughter at the bedside.     Review of Systems   Constitutional:  Negative for activity change and appetite change.   HENT:  Negative for congestion.    Respiratory:  Negative for apnea and chest tightness.    Cardiovascular:  Negative for chest pain.   Gastrointestinal:  Negative for abdominal distention.   Genitourinary:  Negative for difficulty urinating and dysuria.   Musculoskeletal:  Negative for arthralgias and back pain.   Neurological:  Negative for dizziness.   Hematological:  Negative for adenopathy.   Psychiatric/Behavioral:  Positive for behavioral problems.      Objective:     Vital Signs (Most Recent):  Temp: 97.2 °F (36.2 °C) (11/17/23 0452)  Pulse: (!) 59 (11/17/23 0452)  Resp: 20 (11/17/23 0452)  BP: (!) 144/65 (11/17/23 0452)  SpO2: (!) 94 % (11/17/23 0452) Vital Signs (24h Range):  Temp:  [97.2 °F (36.2 °C)-98.3 °F (36.8 °C)] 97.2 °F (36.2 °C)  Pulse:  [59-72] 59  Resp:  [16-20] 20  SpO2:  [94 %-97 %] 94 %  BP: (113-144)/(55-65) 144/65     Weight: 75.5 kg (166 lb 7.2 oz)  Body mass index is 27.7 kg/m².    Intake/Output Summary (Last 24 hours) at 11/17/2023 1149  Last data filed at 11/17/2023 0438  Gross per 24 hour   Intake 1482.5 ml   Output 500 ml   Net 982.5  ml         Physical Exam  Constitutional:       General: He is not in acute distress.     Appearance: He is not ill-appearing.   Cardiovascular:      Rate and Rhythm: Normal rate and regular rhythm.      Heart sounds: No murmur heard.  Pulmonary:      Effort: No respiratory distress.   Abdominal:      General: There is no distension.      Palpations: There is no mass.   Musculoskeletal:         General: No swelling.   Skin:     Coloration: Skin is not jaundiced.   Neurological:      General: No focal deficit present.      Mental Status: He is alert.      Cranial Nerves: No cranial nerve deficit.             Significant Labs: All pertinent labs within the past 24 hours have been reviewed.  CBC:   Recent Labs   Lab 11/16/23  1016 11/17/23  0338   WBC 11.54 9.28   HGB 9.2* 7.9*   HCT 33.9* 28.6*   PLT 98* 77*     CMP:   Recent Labs   Lab 11/16/23  1016 11/17/23  0338   * 132*   K 4.8 4.4    102   CO2 25 22*   * 345*   BUN 37* 39*   CREATININE 0.9 1.0   CALCIUM 10.3 9.8   PROT 6.1 5.6*   ALBUMIN 2.1* 1.9*   BILITOT 0.5 0.3   ALKPHOS 174* 155*   AST 12 13   ALT 29 30   ANIONGAP 9 8       Significant Imaging: I have reviewed all pertinent imaging results/findings within the past 24 hours.    Assessment/Plan:      Metastatic renal cell carcinoma  2016 diagnosed. Follows with Dr. Corado at [facility], recently 2nd opinion Dr. Michael at [facility].   - S/p left nephrectomy  - remission until 8/20 - pleural based nodule, bony lesions, now intracranial lesions  - recent Gamma Knife for frontal brain mass - Dr. Velazquez/ Dr. Ragland ( 11/23)  - Has had progression on Keytruda it appears.   - Unclear what options remain given concerns of performance status, GIB  - Discussed with Dr. Michael, Will ask Heme/Onc to see today.       Thrombocytopenia  - Cause unclear, but new finding.   - suspect prior oncologic therapy related?   - slight decline with IV hydration but feel stable.           Encephalopathy, metabolic  Likely  multifactorial  Nothing to suggest complication from recent gamma knife or any new progression / bleed on this CT Head  - Hypercalcemia corrected  - UTI adequately treated  - no significant metabolic disease evident currently to explain ongoing sx.   - suspect cancerous intracranial pathology is contributing.   - will ask Neurology to see.       Acute cystitis without hematuria  - complained about dysuria outpatient and started on Cipro empirically.   - UA appears benign, but UCXshows GNR 10-50k. ID shows E.Coli -sensitive to Ciprofloxacin.   - continue with oral Cipro for now, likely short course sufficient. Plan for 5 days total (11/19)      Type 2 diabetes mellitus without complication  Patient's FSGs are uncontrolled due to hyperglycemia on current medication regimen.  Last A1c reviewed-   Lab Results   Component Value Date    HGBA1C 9.5 (H) 11/16/2023     Most recent fingerstick glucose reviewed-   Recent Labs   Lab 11/16/23  1544 11/17/23  0009 11/17/23  0807   POCTGLUCOSE 291* 336* 294*     Current correctional scale  Low  Maintain anti-hyperglycemic dose as follows-   Antihyperglycemics (From admission, onward)      Start     Stop Route Frequency Ordered    11/17/23 1020  insulin aspart U-100 pen 0-10 Units         -- SubQ Before meals & nightly PRN 11/17/23 0921    11/17/23 0715  insulin aspart U-100 pen 7 Units         -- SubQ 3 times daily with meals 11/16/23 1700    11/16/23 2100  insulin detemir U-100 (Levemir) pen 20 Units         -- SubQ Nightly 11/16/23 1700          Hold Oral hypoglycemics while patient is in the hospital.    Endocrinology consult appreciated, insulin Rx. Adjusted.   Remains hyperglycemic today. Endocrine to adjust Rx.     Essential hypertension  Appears reasonably well controlled currently.   Pt. Not taking any Rx. But will ask wife to bring med list      Steroid-induced hyperglycemia  - Dexamethasone being weaned        Hypercalcemia of malignancy  The patient has hypercalcemia  "that is currently uncontrolled. The patient has the following symptoms due to their hypercalcemia: weakness and encephalopathy. The hypercalcemia is likely due to Malignancy. We will obtain the following labs to work up the hypercalcemia: PTH No results found for: "PTH" . We will treat the hypercalcemia with: IV fluids ordered at a rate of 150 ml/hr. Their latest calcium has been reviewed and is listed below.  Ionized Calcium   Date Value Ref Range Status   11/16/2023 1.34 1.06 - 1.42 mmol/L Final     - normalized with IV Hydration.     Anemia  Patient's anemia is currently controlled. Has not received any PRBCs to date. Etiology likely d/t chronic blood loss  Current CBC reviewed-   Lab Results   Component Value Date    HGB 7.9 (L) 11/17/2023    HCT 28.6 (L) 11/17/2023       Lab Results   Component Value Date    FERRITIN 446 (H) 11/17/2023     Iron studies reviewed, suspect anemia of chronic disease but has had GIB in the past.   Drop in H/H likely dilution. - no indication for transfusion.       VTE Risk Mitigation (From admission, onward)           Ordered     enoxaparin injection 40 mg  Daily         11/16/23 1341     IP VTE HIGH RISK PATIENT  Once         11/16/23 1341     Place sequential compression device  Until discontinued         11/16/23 1341                    Discharge Planning   ZAIN:      Code Status: Full Code   Is the patient medically ready for discharge?:     Reason for patient still in hospital (select all that apply): Patient trending condition                     Jose Maria Taylor MD  Department of Hospital Medicine   [facility] - Neurosurgery ([facility])    "

## 2023-11-17 NOTE — ASSESSMENT & PLAN NOTE
Patient's anemia is currently controlled. Has not received any PRBCs to date. Etiology likely d/t chronic blood loss  Current CBC reviewed-   Lab Results   Component Value Date    HGB 7.9 (L) 11/17/2023    HCT 28.6 (L) 11/17/2023       Lab Results   Component Value Date    FERRITIN 446 (H) 11/17/2023     Iron studies reviewed, suspect anemia of chronic disease but has had GIB in the past.   Drop in H/H likely dilution. - no indication for transfusion.

## 2023-11-17 NOTE — ASSESSMENT & PLAN NOTE
- complained about dysuria outpatient and started on Cipro empirically.   - UA appears benign, but UCXshows GNR 10-50k. ID shows E.Coli -sensitive to Ciprofloxacin.   - continue with oral Cipro for now, likely short course sufficient. Plan for 5 days total (11/19)

## 2023-11-17 NOTE — PROGRESS NOTES
I have reviewed and concur with Dr. Ricci Garcia's history, physical, assessment, and plan.  I have personally interviewed and examined the patient.          Kaitlyn Torres MD

## 2023-11-17 NOTE — HOSPITAL COURSE
Patient w metastatic RCC folowed by Dr Michael admitted 11/16 for AMS likley mulitfactorial with hypercalcemia (Ca 10.7 corrected 12.5), hyperglycemia, recent gamma knife for brain met on steroids. Also recent thrombocytopenia . Ca improved on IVF, Glucose improved with increase in insulin. Platelets continue to drift down. Mental status improved initially but mild confusion persisting    Interval hx    11/17 no new events over night. BG remains elevated but improved overall. Awake, alert and reading the paper. However keeps talking about topics he has seen on TV this morning rather than engaging in medical discussion.   Daughter at the bedside.     11/18 Mental status improved overnight  - possibly back to recent baseline although this afternoon no so much as he would drift off topic insistently to talk about nonsensically about a TV show that he watched yesterday. Hematology/ oncology, neurology and endocrinology follow noted with appreciation. Family (wife Svitlana  and daughter (Tatyana ) at bed side for several discussions throughout the day

## 2023-11-17 NOTE — ASSESSMENT & PLAN NOTE
Likely multifactorial  Nothing to suggest complication from recent gamma knife or any new progression / bleed on this CT Head  - Hypercalcemia corrected  - UTI adequately treated  - no significant metabolic disease evident currently to explain ongoing sx.   - suspect cancerous intracranial pathology is contributing.   - will ask Neurology to see.

## 2023-11-17 NOTE — CONSULTS
Julio Thao - Neurosurgery ([facility])  Hematology/Oncology  Consult Note    Patient Name: Mark Coppola  MRN: [id number]  Admission Date: 11/16/2023  Hospital Length of Stay: 0 days  Code Status: Full Code   Attending Provider: Jose Maria Taylor MD  Consulting Provider: Jennifer Clements MD  Primary Care Physician: Adele Morales MD  Principal Problem:<principal problem not specified>    Inpatient consult to Hematology/Oncology  Consult performed by: Jennifer Clements MD  Consult ordered by: Jose Maria Taylor MD        Subjective:     HPI:  Mr. Mark Coppola is a 73 year old man with metastatic RCC who presented on 11/16 with acute encephalopathy. His labs were notable for hypercalcemia and new thrombocytopenia. Patient states that he does not want to be on increased dose of steroids and would like to go home. He denies new medications with the exception that ciprofloxacin was started for concern of UTI. Hypercalcemia improved with IVF. Oncology was consulted for hypercalcemia and thrombocytopenia. Regarding metastatic RCC, patient saw Dr. Michael last month and was referred to Radiation Oncology and Neurosurgery.     Oncologic history (per chart review):  Mark Gonzales is a 72 y.o. adult with renal cell carcinoma, Stage IV (TX NX M1), with small bowel, osseous, lung involvement.   Also with history of prostate cancer (2012) status-post radical prostatectomy, Stage IIB ([id number]) (Alvaro 4+4, grade group 4).   Patient with kidney cancer initially diagnosed 2016. Underwent RIGHT radical nephrectomy 7/1/16 and approximately 2 years later, 10/2018 had disease progression with imaging showing lung nodules and mediastinal lymphadenopathy. In late 2018, he was started on palliative systemic therapy with ipilimumab/nivolumab. 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 Nivolumab was stopped 02/2020 and he was a treatment break 02/2021. Then with recurrence/progression in resume Nivolumab 4/1/21. MRI brain  showed disease progression as well and he completed SRS to the brain 07/2021. He was started on palliative systemic therapy #2 with pembrolizumab 400 mg IV every 6 weeks and oral lenvatinib 09/2021. He had oral mucositis with pembrolizumab, so dose was changed from 400 mg to 200 mg every 3 weeks 8/1022. He then had life-threatening GI bleed 10/2022. Multidisciplinary discussion was held and patient elected to proceed with palliative radiation to the ampulla/metastatic deposit with Dr. Hinson at [facility]. This was completed 12/2022.     Oncology Treatment Plan:   [No matching plan found]    Medications:  Continuous Infusions:  Scheduled Meds:   ciprofloxacin HCl  500 mg Oral BID    dexAMETHasone  4 mg Oral Q8H    enoxparin  40 mg Subcutaneous Daily    famotidine  20 mg Oral BID    insulin aspart U-100  14 Units Subcutaneous TIDWM    insulin detemir U-100  22 Units Subcutaneous QHS    levothyroxine  0.2 mg Oral Before breakfast    senna-docusate 8.6-50 mg  1 tablet Oral BID     PRN Meds:acetaminophen, dextrose 10%, dextrose 10%, glucagon (human recombinant), glucose, glucose, HYDROcodone-acetaminophen, insulin aspart U-100, melatonin, sodium chloride 0.9%     Review of patient's allergies indicates:   Allergen Reactions    Iodinated contrast media Rash     Delayed non itching rash after CT scan.    Per Nsg Notes 4/30/19 - premed with Benadryl 25mg tab, and no reaction noted post CT.   8/6/19 did well with Benadryl 25 mg PO x1        No past medical history on file.  Past Surgical History:   Procedure Laterality Date    ENDOSCOPY OF PROXIMAL SMALL INTESTINE N/A 8/18/2022    Procedure: Upper DBE with general anesthesia;  Surgeon: Michael Silveira MD;  Location: [facility];  Service: Endoscopy;  Laterality: N/A;    RADICAL NEPHRECTOMY Right 08/18/2016    THYROIDECTOMY       Family History    None       Tobacco Use    Smoking status: Never    Smokeless tobacco: Not on file   Substance and Sexual Activity    Alcohol use: Not  on file    Drug use: Not on file    Sexual activity: Not on file       Review of Systems   Constitutional:  Negative for activity change, appetite change and fever.   HENT:  Negative for congestion and dental problem.    Eyes:  Negative for photophobia and visual disturbance.   Respiratory:  Negative for apnea and chest tightness.    Cardiovascular:  Negative for chest pain and leg swelling.   Gastrointestinal:  Negative for abdominal distention and abdominal pain.   Genitourinary:  Negative for difficulty urinating and dysuria.   Musculoskeletal:  Negative for arthralgias and back pain.   Skin:  Negative for color change and rash.   Neurological:  Negative for dizziness.   Hematological:  Negative for adenopathy.   Psychiatric/Behavioral:  Negative for hallucinations. The patient is not nervous/anxious.      Objective:     Vital Signs (Most Recent):  Temp: 98.4 °F (36.9 °C) (11/17/23 1625)  Pulse: 73 (11/17/23 1625)  Resp: 20 (11/17/23 1625)  BP: 134/63 (11/17/23 1625)  SpO2: 96 % (11/17/23 1625) Vital Signs (24h Range):  Temp:  [97.2 °F (36.2 °C)-98.4 °F (36.9 °C)] 98.4 °F (36.9 °C)  Pulse:  [59-73] 73  Resp:  [16-20] 20  SpO2:  [94 %-97 %] 96 %  BP: (113-144)/(55-65) 134/63     Weight: 75.5 kg (166 lb 7.2 oz)  Body mass index is 27.7 kg/m².  Body surface area is 1.86 meters squared.      Intake/Output Summary (Last 24 hours) at 11/17/2023 1648  Last data filed at 11/17/2023 0438  Gross per 24 hour   Intake 282.5 ml   Output 500 ml   Net -217.5 ml        Physical Exam  Constitutional:       General: He is not in acute distress.     Appearance: He is not ill-appearing.   HENT:      Head: Normocephalic and atraumatic.   Eyes:      General: No scleral icterus.     Extraocular Movements: Extraocular movements intact.   Cardiovascular:      Rate and Rhythm: Normal rate and regular rhythm.   Pulmonary:      Effort: Pulmonary effort is normal. No respiratory distress.   Abdominal:      General: There is no distension.       Palpations: Abdomen is soft.   Musculoskeletal:         General: No swelling or deformity.      Cervical back: Normal range of motion and neck supple.   Skin:     Coloration: Skin is not jaundiced.   Neurological:      General: No focal deficit present.      Mental Status: He is alert.      Cranial Nerves: No cranial nerve deficit.   Psychiatric:         Mood and Affect: Mood normal.         Behavior: Behavior normal.          Significant Labs:   CBC:   Recent Labs   Lab 11/16/23  1016 11/17/23  0338   WBC 11.54 9.28   HGB 9.2* 7.9*   HCT 33.9* 28.6*   PLT 98* 77*    and CMP:   Recent Labs   Lab 11/16/23  1016 11/17/23  0338   * 132*   K 4.8 4.4    102   CO2 25 22*   * 345*   BUN 37* 39*   CREATININE 0.9 1.0   CALCIUM 10.3 9.8   PROT 6.1 5.6*   ALBUMIN 2.1* 1.9*   BILITOT 0.5 0.3   ALKPHOS 174* 155*   AST 12 13   ALT 29 30   ANIONGAP 9 8       Diagnostic Results:  I have reviewed all pertinent imaging results/findings within the past 24 hours.  Assessment/Plan:     Thrombocytopenia  Patient is a 73 year old man with metastatic RCC who presents with acute encephalopathy concerning for UTI vs metabolic derangements. Labs notable for new thrombocytopenia.    Recommendations:  - Please obtain nutritional (vitamin B12, folate, copper), hemolysis (RYLAND, retic, haptoglobin, LDH), DIC labs (PT/INR, fibrinogen), peripheral smear to further evaluate.  - Patient does not need to remain inpatient for further workup if he chooses to be discharged.    Hypercalcemia of malignancy  Improved with IVF, however, still elevated at 11.1. Recommend aggressive IVF normal saline (200-250cc/hr) if patient can tolerate. Consider bisphosphonate (ie zoledrenic acid) and calcitonin if this worsens.     Metastatic renal cell carcinoma  Currently not on therapy. Will arrange for follow up with Dr. Michael upon discharge.      Patient seen and discussed with attending, Dr. Pratt.    Thank you for your consult. I will  follow-up with patient. Please contact us if you have any additional questions.    Jennifer Clements MD  Hematology/Oncology  Julio hTao - Neurosurgery ([facility])

## 2023-11-17 NOTE — PLAN OF CARE
Julio Thao - Neurosurgery ([facility])  Initial Discharge Assessment       Primary Care Provider: IVAN Corado MD    Admission Diagnosis: AMS (altered mental status) [R41.82]    Admission Date: 11/16/2023  Expected Discharge Date:     Transition of Care Barriers: None    Payor: BLUE CROSS BLUE SHIELD / Plan: [place] PPO / Product Type: PPO /     Extended Emergency Contact Information  Primary Emergency Contact: SAMARA JUSTIN  Mobile Phone: [phone]  Relation: Spouse  Preferred language: English   needed? No    Discharge Plan A: Home with family, Home Health  Discharge Plan B: Home with family      [facility] [place] [address] - [place], [place] - [address] [place] [address]  [address] [place] [address]  [place] [address]  Phone: [phone] Fax: [phone]    [facility] ([place]) - [place] - [address] [place] [address]  [address] [place] [address]  [place] [address]  Phone: [phone] Fax: [phone]    [id number] - [place], [place] - [address]  [address] [facility] [address]  [address] [place] [address]  Phone: [phone] Fax: [phone]    [id number] - [place] - [address] AT [facility] [address] [facility]  [address] [place] [address]  [place] [address]  Phone: [phone] Fax: [phone]    CM obtained discharge planning assessment with patient and spouse.  Patient provided with discharge planning booklet.    Discharge Plan A and Plan B have been determined by review of patient's clinical status, future medical and therapeutic needs, and coverage/benefits for post-acute care in coordination with multidisciplinary team members.   Initial Assessment (most recent)       Adult Discharge Assessment - 11/17/23 1355          Discharge Assessment    Assessment Type Discharge Planning Assessment     Confirmed/corrected address, phone number and insurance Yes     Confirmed Demographics Correct on Facesheet     Source of Information patient;family      Communicated ZAIN with patient/caregiver Date not available/Unable to determine     Reason For Admission essential hypertension     People in Home spouse     Do you expect to return to your current living situation? Yes     Do you have help at home or someone to help you manage your care at home? Yes     Who are your caregiver(s) and their phone number(s)? Svitlana Coppola - spouse [phone]     Prior to hospitilization cognitive status: Alert/Oriented     Current cognitive status: Alert/Oriented     Walking or Climbing Stairs ambulation difficulty, requires equipment     Mobility Management walker     Equipment Currently Used at Home walker, rolling;wheelchair;rollator     Readmission within 30 days? No     Patient currently being followed by outpatient case management? No     Do you currently have service(s) that help you manage your care at home? Yes     Name and Contact number of agency [facility]     Is the pt/caregiver preference to resume services with current agency Yes     Do you take prescription medications? Yes     Do you have prescription coverage? Yes     Coverage [facility]     Do you have any problems affording any of your prescribed medications? No     Is the patient taking medications as prescribed? yes     Who is going to help you get home at discharge? family     How do you get to doctors appointments? family or friend will provide;car, drives self     Are you on dialysis? No     Do you take coumadin? No     DME Needed Upon Discharge  none     Discharge Plan discussed with: Patient;Spouse/sig other     Name(s) and Number(s) Svitlana Coppola - spouse [phone]     Transition of Care Barriers None     Discharge Plan A Home with family;Home Health     Discharge Plan B Home with family        Physical Activity    On average, how many days per week do you engage in moderate to strenuous exercise (like a brisk walk)? 3 days     On average, how many minutes do you engage in  exercise at this level? 20 min        Financial Resource Strain    How hard is it for you to pay for the very basics like food, housing, medical care, and heating? Not hard at all        Housing Stability    In the last 12 months, was there a time when you were not able to pay the mortgage or rent on time? No     In the last 12 months, was there a time when you did not have a steady place to sleep or slept in a shelter (including now)? No        Transportation Needs    In the past 12 months, has lack of transportation kept you from medical appointments or from getting medications? No     In the past 12 months, has lack of transportation kept you from meetings, work, or from getting things needed for daily living? No        Food Insecurity    Within the past 12 months, you worried that your food would run out before you got the money to buy more. Never true     Within the past 12 months, the food you bought just didn't last and you didn't have money to get more. Never true        Stress    Do you feel stress - tense, restless, nervous, or anxious, or unable to sleep at night because your mind is troubled all the time - these days? To some extent        Social Connections    In a typical week, how many times do you talk on the phone with family, friends, or neighbors? More than three times a week     How often do you get together with friends or relatives? More than three times a week     How often do you attend Zoroastrian or Rastafarian services? More than 4 times per year     Do you belong to any clubs or organizations such as Zoroastrian groups, unions, fraternal or athletic groups, or school groups? Yes     How often do you attend meetings of the clubs or organizations you belong to? More than 4 times per year     Are you , , , , never , or living with a partner?         Alcohol Use    Q1: How often do you have a drink containing alcohol? Never     Q2: How many drinks containing  alcohol do you have on a typical day when you are drinking? Patient does not drink     Q3: How often do you have six or more drinks on one occasion? Never        OTHER    Name(s) of People in Home spouse

## 2023-11-17 NOTE — ASSESSMENT & PLAN NOTE
Patient is a 73 year old man with metastatic RCC who presents with acute encephalopathy concerning for UTI vs metabolic derangements. Labs notable for new thrombocytopenia.    Recommendations:  - Please obtain nutritional (vitamin B12, folate, copper), hemolysis (RYLAND, retic, haptoglobin, LDH), DIC labs (PT/INR, fibrinogen), peripheral smear to further evaluate.  - Patient does not need to remain inpatient for further workup if he chooses to be discharged.

## 2023-11-17 NOTE — SUBJECTIVE & OBJECTIVE
"Interval HPI:   Overnight events: No acute events overnight. Daughter at bedside reports patient still acutely confused. Has been worse lately. He does not recognize which daughter is at bedside. He is able to answer some questions. States that he is eating, denies nausea. No hypoglycemia or fever. BG persistently elevated.    BP (!) 144/65 (BP Location: Left arm, Patient Position: Lying)   Pulse (!) 59   Temp 97.2 °F (36.2 °C) (Oral)   Resp 20   Ht 5' 5" (1.651 m)   Wt 75.5 kg (166 lb 7.2 oz)   SpO2 (!) 94%   BMI 27.70 kg/m²     Labs Reviewed and Include    Recent Labs   Lab 11/17/23  0338   *   CALCIUM 9.8   ALBUMIN 1.9*   PROT 5.6*   *   K 4.4   CO2 22*      BUN 39*   CREATININE 1.0   ALKPHOS 155*   ALT 30   AST 13   BILITOT 0.3     Lab Results   Component Value Date    WBC 9.28 11/17/2023    HGB 7.9 (L) 11/17/2023    HCT 28.6 (L) 11/17/2023    MCV 90 11/17/2023    PLT 77 (L) 11/17/2023     Recent Labs   Lab 11/17/23  0338   TSH 0.013*   FREET4 1.03     Lab Results   Component Value Date    HGBA1C 9.5 (H) 11/16/2023       Nutritional status:   Body mass index is 27.7 kg/m².  Lab Results   Component Value Date    ALBUMIN 1.9 (L) 11/17/2023    ALBUMIN 2.1 (L) 11/16/2023    ALBUMIN 1.9 (L) 11/14/2023     No results found for: "PREALBUMIN"    Estimated Creatinine Clearance: 60.6 mL/min (based on SCr of 1 mg/dL).    Accu-Checks  Recent Labs     11/16/23  1544 11/17/23  0009 11/17/23  0807   POCTGLUCOSE 291* 336* 294*       Current Medications and/or Treatments Impacting Glycemic Control  Immunotherapy:    Immunosuppressants       None          Steroids:   Hormones (From admission, onward)      Start     Stop Route Frequency Ordered    11/16/23 1645  dexAMETHasone tablet 2 mg         -- Oral 2 times daily with meals 11/16/23 1341    11/16/23 1436  melatonin tablet 6 mg         -- Oral Nightly PRN 11/16/23 1341          Pressors:    Autonomic Drugs (From admission, onward)      None      "     Hyperglycemia/Diabetes Medications:   Antihyperglycemics (From admission, onward)      Start     Stop Route Frequency Ordered    11/17/23 1020  insulin aspart U-100 pen 0-10 Units         -- SubQ Before meals & nightly PRN 11/17/23 0921    11/17/23 0715  insulin aspart U-100 pen 7 Units         -- SubQ 3 times daily with meals 11/16/23 1700    11/16/23 2100  insulin detemir U-100 (Levemir) pen 20 Units         -- SubQ Nightly 11/16/23 1700

## 2023-11-17 NOTE — HPI
Mr. Mark Coppola is a 73 year old man with metastatic RCC who presented on 11/16 with acute encephalopathy. His labs were notable for hypercalcemia and new thrombocytopenia. Patient states that he does not want to be on increased dose of steroids and would like to go home. He denies new medications with the exception that ciprofloxacin was started for concern of UTI. Hypercalcemia improved with IVF. Oncology was consulted for hypercalcemia and thrombocytopenia. Regarding metastatic RCC, patient saw Dr. Michael last month and was referred to Radiation Oncology and Neurosurgery.     Oncologic history (per chart review):  Mark Gonzales is a 72 y.o. adult with renal cell carcinoma, Stage IV (TX NX M1), with small bowel, osseous, lung involvement.   Also with history of prostate cancer (2012) status-post radical prostatectomy, Stage IIB ([id number]) (Alvaro 4+4, grade group 4).   Patient with kidney cancer initially diagnosed 2016. Underwent RIGHT radical nephrectomy 7/1/16 and approximately 2 years later, 10/2018 had disease progression with imaging showing lung nodules and mediastinal lymphadenopathy. In late 2018, he was started on palliative systemic therapy with ipilimumab/nivolumab. 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 Nivolumab was stopped 02/2020 and he was a treatment break 02/2021. Then with recurrence/progression in resume Nivolumab 4/1/21. MRI brain showed disease progression as well and he completed SRS to the brain 07/2021. He was started on palliative systemic therapy #2 with pembrolizumab 400 mg IV every 6 weeks and oral lenvatinib 09/2021. He had oral mucositis with pembrolizumab, so dose was changed from 400 mg to 200 mg every 3 weeks 8/1022. He then had life-threatening GI bleed 10/2022. Multidisciplinary discussion was held and patient elected to proceed with palliative radiation to the ampulla/metastatic deposit with Dr. Hinson at [facility]. This was completed 12/2022.

## 2023-11-17 NOTE — ASSESSMENT & PLAN NOTE
2016 diagnosed. Follows with Dr. Corado at [facility], recently 2nd opinion Dr. Michael at [facility].   - S/p left nephrectomy  - remission until 8/20 - pleural based nodule, bony lesions, now intracranial lesions  - recent Gamma Knife for frontal brain mass - Dr. Velazquez/ Dr. Ragland ( 11/23)  - Has had progression on Keytruda it appears.   - Unclear what options remain given concerns of performance status, GIB  - Discussed with Dr. Michael, Will ask Heme/Onc to see today.

## 2023-11-17 NOTE — CARE UPDATE
Review of glucose trends.  Greater than 200 with lunch. Received 4 units of correction  Increased mealtime insulin to 10 units with meals

## 2023-11-17 NOTE — ASSESSMENT & PLAN NOTE
Malnutrition Type:  Context: acute illness or injury  Level: mild    Related to (etiology):   Inadequate energy intake    Signs and Symptoms (as evidenced by):   Malnutrition Characteristic Summary:  Weight Loss (Malnutrition):  (7% x 1 month)    Interventions/Recommendations (treatment strategy):  1. Continue Diabetic diet  2. If PO intake <50%, add Boost glucose control TID  3. RD to monitor and follow    Nutrition Diagnosis Status:   New

## 2023-11-17 NOTE — NURSING
Nurses Note -- 4 Eyes      11/17/2023   1:49 AM      Skin assessed during: Admit      [] No Altered Skin Integrity Present    []Prevention Measures Documented      [x] Yes- Altered Skin Integrity Present or Discovered   [x] LDA Added if Not in Epic (Describe Wound)   [x] New Altered Skin Integrity was Present on Admit and Documented in LDA   [x] Wound Image Taken    Wound Care Consulted? Yes    Attending Nurse:  Steven Park RN/Staff Member:  ABRAHAM Pierson

## 2023-11-17 NOTE — ASSESSMENT & PLAN NOTE
Labs from [facility]          Patients taking Sutent may have increasing levothyroxine requirements while on Sutent. He has taken this medicine in the past, although not clearly recently.  Also a question of compliance with medications at home given recent worsening of confusion and unclear if he is taking Levothyroxine appropriately.    - TFTs show subclinical hyperthyroidism, can reevaluate as an outpatient

## 2023-11-17 NOTE — ASSESSMENT & PLAN NOTE
Labs obtained in September show low PTH, Normal Vitamin D and undetectable PTHrp    He is receiving treatment for hypercalcemia of malignancy with Xgeva per oncology, so would defer any further management of hypercalcemia of malignancy to Oncology

## 2023-11-17 NOTE — CONSULTS
Julio Thao - Neurosurgery ([facility])  Wound Care    Patient Name:  Mark Coppola   MRN:  [id number]  Date: 11/17/2023  Diagnosis: <principal problem not specified>    History:     No past medical history on file.    Social History     Socioeconomic History    Marital status:    Tobacco Use    Smoking status: Never     Social Determinants of Health     Financial Resource Strain: Low Risk  (11/17/2023)    Overall Financial Resource Strain (CARDIA)     Difficulty of Paying Living Expenses: Not hard at all   Food Insecurity: No Food Insecurity (11/17/2023)    Hunger Vital Sign     Worried About Running Out of Food in the Last Year: Never true     Ran Out of Food in the Last Year: Never true   Transportation Needs: No Transportation Needs (11/17/2023)    PRAPARE - Transportation     Lack of Transportation (Medical): No     Lack of Transportation (Non-Medical): No   Physical Activity: Insufficiently Active (11/17/2023)    Exercise Vital Sign     Days of Exercise per Week: 3 days     Minutes of Exercise per Session: 20 min   Stress: Stress Concern Present (11/17/2023)    [employer] - Occupational Stress Questionnaire     Feeling of Stress : To some extent   Social Connections: Socially Integrated (11/17/2023)    Social Connection and Isolation Panel [NHANES]     Frequency of Communication with Friends and Family: More than three times a week     Frequency of Social Gatherings with Friends and Family: More than three times a week     Attends Sabianist Services: More than 4 times per year     Active Member of Clubs or Organizations: Yes     Attends Club or Organization Meetings: More than 4 times per year     Marital Status:    Housing Stability: Unknown (11/17/2023)    Housing Stability Vital Sign     Unable to Pay for Housing in the Last Year: No     Unstable Housing in the Last Year: No       Precautions:     Allergies as of 11/16/2023 - Reviewed 11/16/2023   Allergen Reaction  Noted    Iodinated contrast media Rash 10/12/2018       [facility] Assessment Details/Treatment   Wound care consult received to assess buttock.  Patient is a 73 year-old Male admitted to [facility] for hypercalcemia of malignancy, thrombocytopenia, steriod induced hyperglycemia, and mild malnutrition.   Chay Score:   Nutritional Status:   History of wound:                                                                                                    Patient present with almost resolved wound to left upper buttock.  Patient was using duoderm at home. Family performing wound care.  Moisture Associated breakdown to gluteal cleft.   Risk factors affecting wound healing:  Mild malnutrition, DM2,    H/H 7.9/28.6 Plat 77, Plat decreased Ferritin 446 TSH 0.013  Na 132 Glucose 345 Bun 39 TP 5.6 Alb 1.9 Hgb A1C 9.2     11/17/23 1455   WOCN Assessment   WOCN Total Time (mins) 60   Visit Date 11/17/23   Visit Time 1655   Consult Type New   WOCN Speciality Wound   Intervention assessed;coordination of care;team conference;orders   Skin Interventions   Pressure Reduction Devices other (see comments);pressure-redistributing mattress utilized  (immerse ordered)   Pressure Reduction Techniques frequent weight shift encouraged;positioned off wounds;sit time limited to 2 hours   Pressure Injury Prevention    Check Moisture Management Pad Done   Check Medical Devices Done        Altered Skin Integrity 11/16/23 2337 Sacral spine Partial thickness tissue loss. Shallow open ulcer with a red or pink wound bed, without slough. Intact or Open/Ruptured Serum-filled blister.   Date First Assessed/Time First Assessed: 11/16/23 2337   Altered Skin Integrity Present on Admission - Did Patient arrive to the hospital with altered skin?: yes  Location: Sacral spine  Is this injury device related?: No  Description of Altered Skin ...   Wound Image    Dressing Appearance Intact   Drainage Amount None   Appearance Red;Moist   Tissue loss description Partial  thickness   Periwound Area Redness   Wound Edges Jagged   Wound Length (cm) 1 cm   Wound Width (cm) 0.1 cm   Wound Depth (cm) 0.1 cm   Wound Volume (cm^3) 0.01 cm^3   Wound Surface Area (cm^2) 0.1 cm^2   Dressing Applied;Other (comment)  (triad)   Recommendations:  Keep area clean and dry.  Apply triad BID and PRN  Pressure Prevention protocol in place  Immerse bed ordered but d/c d/t patient leaving hospital today.  Ordered waffle mattress    Findings and recommendations discuss with treatment team.  Staff to perform wound care.  Wound care will continue to follow if patient continues care in hospital.    11/17/2023

## 2023-11-17 NOTE — PROGRESS NOTES
"Julio Thao - Neurosurgery ([facility])  Endocrinology  Progress Note    Admit Date: 11/16/2023     Mark Coppola is a 73 y.o. 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 Labs obtained two days ago were positive for a corrected calcium of 12.8. Patient is a poor historian owing to recent worsening of confusion potentially in the setting of UTI. I spoke with patients leonora Pitts at bedside.    Endocrinology is consulted to assist with management of hyperglycemia.    Patient was started on dexamethasone for progression of brain mets and he underwent gamma knife with Dr. Ragland on 11/10/23. Since starting steroids he has had significant hyperglycemia for which he was started on BID levemir and mealtime novolog per his primary card doctor. He has had persistently elevated blood glucose levels in the 300s at home and is wearing a DEXCOM now. Hong states that he is not sure regarding patients compliance with insulin injections at home. Patient does not think that he is taking any levemir.    Additional review of the chart shows hypothyroidism. Family at bedside report that this has not been addressed.            Interval HPI:   Overnight events: No acute events overnight. Daughter at bedside reports patient still acutely confused. Has been worse lately. He does not recognize which daughter is at bedside. He is able to answer some questions. States that he is eating, denies nausea. No hypoglycemia or fever. BG persistently elevated.    BP (!) 144/65 (BP Location: Left arm, Patient Position: Lying)   Pulse (!) 59   Temp 97.2 °F (36.2 °C) (Oral)   Resp 20   Ht 5' 5" (1.651 m)   Wt 75.5 kg (166 lb 7.2 oz)   SpO2 (!) 94%   BMI 27.70 kg/m²     Labs Reviewed and Include    Recent Labs   Lab " "11/17/23  0338   *   CALCIUM 9.8   ALBUMIN 1.9*   PROT 5.6*   *   K 4.4   CO2 22*      BUN 39*   CREATININE 1.0   ALKPHOS 155*   ALT 30   AST 13   BILITOT 0.3     Lab Results   Component Value Date    WBC 9.28 11/17/2023    HGB 7.9 (L) 11/17/2023    HCT 28.6 (L) 11/17/2023    MCV 90 11/17/2023    PLT 77 (L) 11/17/2023     Recent Labs   Lab 11/17/23  0338   TSH 0.013*   FREET4 1.03     Lab Results   Component Value Date    HGBA1C 9.5 (H) 11/16/2023       Nutritional status:   Body mass index is 27.7 kg/m².  Lab Results   Component Value Date    ALBUMIN 1.9 (L) 11/17/2023    ALBUMIN 2.1 (L) 11/16/2023    ALBUMIN 1.9 (L) 11/14/2023     No results found for: "PREALBUMIN"    Estimated Creatinine Clearance: 60.6 mL/min (based on SCr of 1 mg/dL).    Accu-Checks  Recent Labs     11/16/23  1544 11/17/23  0009 11/17/23  0807   POCTGLUCOSE 291* 336* 294*       Current Medications and/or Treatments Impacting Glycemic Control  Immunotherapy:    Immunosuppressants       None          Steroids:   Hormones (From admission, onward)      Start     Stop Route Frequency Ordered    11/16/23 1645  dexAMETHasone tablet 2 mg         -- Oral 2 times daily with meals 11/16/23 1341    11/16/23 1436  melatonin tablet 6 mg         -- Oral Nightly PRN 11/16/23 1341          Pressors:    Autonomic Drugs (From admission, onward)      None          Hyperglycemia/Diabetes Medications:   Antihyperglycemics (From admission, onward)      Start     Stop Route Frequency Ordered    11/17/23 1020  insulin aspart U-100 pen 0-10 Units         -- SubQ Before meals & nightly PRN 11/17/23 0921    11/17/23 0715  insulin aspart U-100 pen 7 Units         -- SubQ 3 times daily with meals 11/16/23 1700    11/16/23 2100  insulin detemir U-100 (Levemir) pen 20 Units         -- SubQ Nightly 11/16/23 1700            ASSESSMENT and PLAN    Renal/  Hypercalcemia of malignancy  Labs obtained in September show low PTH, Normal Vitamin D and undetectable " PTHrp    He is receiving treatment for hypercalcemia of malignancy with Xgeva per oncology, so would defer any further management of hypercalcemia of malignancy to Oncology      Endocrine  Steroid-induced hyperglycemia  Key History and Diagnostic Findings  Hyperglycemia started with initiation of dexamethasone  Now on 2g BID dexamethasone  Does have an elevated hemoglobin A1c now although would question the accuracy given approximately two months of steroid induced hyperglycemia. A1c of 5.8 in 2020 indicates potentially prediabetic  Home regimen prior to admission: L10BID, N5WM and ISS    Plan  - Weight based insulin requirements: 20 units long acting, 6-7U TIDWM  - Steroid induced hyperglycemia benefits greater from mealtime insulin adjustment.  - Continue mealtime Novolog 7 units with meals for now- will monitor mealtime trends today and adjust accordingly  - Increase levemir to 22 units nightly tonight (was 20 units, for persistent hyperglycemia on the fasting blood sugar this morning)  - Continue accuchecks ACHS and increase to medium dose sliding scale        Postoperative hypothyroidism  Labs from [facility]          Patients taking Sutent may have increasing levothyroxine requirements while on Sutent. He has taken this medicine in the past, although not clearly recently.  Also a question of compliance with medications at home given recent worsening of confusion and unclear if he is taking Levothyroxine appropriately.    - TFTs show subclinical hyperthyroidism, can reevaluate as an outpatient        Ricci Garcia DO  Endocrinology  Julio Thao - Neurosurgery ([facility])

## 2023-11-17 NOTE — CONSULTS
Hospital Medicine Pharmacy Consult Note    PharmD Consult Received For:     Pharmacy to perform an admission medication history and reconciliation. Please see separate consult note for details.    Pharmacy will sign-off, please re-consult as needed    Thank you for the consult,  Jelani Stark, PharmD, [facility] h  Ext. [phone]    **Note: Consults are reviewed Monday-Friday 7:00am-3:30pm. The above recommendations are only suggested. The recommendations should be considered in conjunction with all patient factors.**

## 2023-11-17 NOTE — CONSULTS
"Admission Medication History     The home medication history was taken by Jelani Stark, PharmD, BCPS.    You may go to "Admission" then "Reconcile Home Medications" tabs to review and/or act upon these items.     The home medication list has been updated by the Pharmacy department.   Please read ALL comments highlighted in yellow.   Please address this information as you see fit.    Feel free to contact us if you have any questions or require assistance.      The medications listed below were removed from the home medication list. Please reorder if appropriate:  Patient reports no longer taking the following medication(s):  Pravastatin, Otezla, Lenvima, Keytruda, Cabometyx, Nivolumab, Metformin, Insulin, BP meds    Medications listed below were obtained from: Medications brought from home and speaking with family  PTA Medications   Medication Sig    ciprofloxacin HCl (CIPRO) 500 MG tablet Take 1 tablet (500 mg total) by mouth 2 (two) times daily. for 10 days    dexAMETHasone (DECADRON) 2 MG tablet Take 2 mg by mouth 2 times daily with meals.    esomeprazole (NEXIUM) 40 MG capsule Take 40 mg by mouth 2 (two) times daily.    famotidine-calcium carbonate-magnesium hydroxide (PEPCID COMPLETE) chewable tablet Take 1 tablet by mouth nightly as needed.    levothyroxine (SYNTHROID) 200 MCG tablet Take 200 mcg by mouth before breakfast.    ondansetron (ZOFRAN-ODT) 4 MG TbDL DISSOLVE 1 TABLET ON THE TONGUE EVERY 4 HOURS AS NEEDED FOR NAUSEA       Potential issues to be addressed PRIOR TO DISCHARGE  During time of interview, patient was confused and family provided medication history. Family brought in all the medications the patient was taking and informed me he does not take anything else. At discharge, patient/family may need reinforcement on any new medications started, specifically if its for diabetes.    Jelani Stark, PharmD, BCPS   Ext. [phone]  "

## 2023-11-17 NOTE — ASSESSMENT & PLAN NOTE
Appears reasonably well controlled currently.   Pt. Not taking any Rx. But will ask wife to bring med list     Telephone Encounter by Carmela Lassiter at 18 10:57 AM     Author:  Carmela Lassiter Service:  (none) Author Type:  Patient      Filed:  18 10:59 AM Encounter Date:  2018 Status:  Signed     :  Carmela Lassiter (Patient )              KATERINA CHEN    Patient Age: 54 year old   Refill request by:[SA1.1T] Walk in.  Patient wants a call back? Yes -  Ok to leave response (including medical information) on answering machine[SA1.1M]  Refill to be:[SA1.1T] Picked up at [SA1.1M]    Medication requested to be refilled:[SA1.1T]   Requested Prescriptions     Pending Prescriptions Disp Refills   • hydrocodone-acetaminophen (NORCO)  MG per tablet 200 Tab 0     Si-2 tablets every 4-6 hours as needed.[SA1.2T]   PER PATIENT. PLEASE CALL WHEN READY FOR .[SA1.1M] Message confirmed with caller.          Next and Last Visit with Provider and Department  Next visit with ABDIRASHID JACKSON is on No match found  Next visit with [facility]ILY [facility] is on No match found   Last visit with ABDIRASHID JACKSON was on 2017 at  3:40 PM in [facility]  Last visit with FAMILY PRACTICE was on 2017 at  3:40 PM in [facility]      WEIGHT AND HEIGHT: As of 2018 weight is 215 lbs.(97.523 kg). Height is 6' 0\"(1.829 m).   BMI is 29.15 kg/(m^2) calculated from:     Height 6' 0\" (1.829 m) as of 18     Weight 215 lb (97.523 kg) as of 18[SA1.1T]      No Known Allergies[SA1.2T]  Current outpatient prescriptions       Medication  Sig Dispense Refill   • flecainide (TAMBOCOR) 50 MG tablet TAKE ONE TABLET BY MOUTH TWICE DAILY  60 Tab 0   • warfarin (COUMADIN) 4 MG tablet Take 2 Tabs by mouth daily. 60 Tab 5   • atorvastatin (LIPITOR) 80 MG tablet TAKE ONE TABLET BY MOUTH ONE TIME DAILY  90 Tab 0   • Diclofenac Sodium 1 % GEL Apply one gram to the affected area(s) bid as directed 100 g 0   • atorvastatin (LIPITOR) 80 MG tablet Take 0.5  Tabs by mouth daily. 30 Tab 5   • hydrocodone-acetaminophen (NORCO)  MG per tablet 1-2 tablets every 4-6 hours as needed. 200 Tab 0   • B Complex Vitamins (VITAMIN B COMPLEX) TABS Take 1 Each by mouth daily. 30 Each 0   • vitamin E 400 UNIT Cap Take 400 Units by mouth daily.     • Zinc Sulfate (ZINC 15 OR) Take  by mouth daily.     • Omega-3 Fatty Acids (FISH OIL) 1000 MG CPDR Take  by mouth.     • FOLIC ACID OR Take  by mouth.     • Selenium (SELENIMIN-200 OR) Take  by mouth.          ROUTING:[SA1.1T] Patient's physician/staff[SA1.1M]        PCP: Frankie Peña MD         INS: Payor: [employer] O / Plan:  001 / Product Type: *No Product type* / Note: This is the primary coverage, but no account was found for this location or the patient's primary location.   ADDRESS:  [address] [place] [id number][SA1.1T]       Revision History        User Key Date/Time User Provider Type Action    > SA1.2 02/22/18 10:59 AM Carmela Lassiter Patient  Sign     SA1.1 02/22/18 10:57 AM Carmela Lassiter Patient      M - Manual, T - Template

## 2023-11-17 NOTE — ASSESSMENT & PLAN NOTE
Key History and Diagnostic Findings  Hyperglycemia started with initiation of dexamethasone  Now on 2g BID dexamethasone  Does have an elevated hemoglobin A1c now although would question the accuracy given approximately two months of steroid induced hyperglycemia. A1c of 5.8 in 2020 indicates potentially prediabetic  Home regimen prior to admission: L10BID, N5WM and ISS    Plan  - Weight based insulin requirements: 20 units long acting, 6-7U TIDWM  - Steroid induced hyperglycemia benefits greater from mealtime insulin adjustment.  - Continue mealtime Novolog 7 units with meals for now- will monitor mealtime trends today and adjust accordingly  - Increase levemir to 22 units nightly tonight (was 20 units, for persistent hyperglycemia on the fasting blood sugar this morning)  - Continue accuchecks ACHS and increase to medium dose sliding scale

## 2023-11-17 NOTE — PROGRESS NOTES
Julio Thao - Neurosurgery ([facility])  Adult Nutrition  Progress Note    SUMMARY       Recommendations    1. Continue Diabetic diet      2. If PO intake <50%, add Boost glucose control TID      3. RD to monitor and follow    Goals: Meet % EEN, EPN by RD f/u  Nutrition Goal Status: new  Communication of RD Recs:  (POC)    Assessment and Plan    Endocrine  Mild malnutrition  Malnutrition Type:  Context: acute illness or injury  Level: mild    Related to (etiology):   Inadequate energy intake    Signs and Symptoms (as evidenced by):   Malnutrition Characteristic Summary:  Weight Loss (Malnutrition):  (7% x 1 month)    Interventions/Recommendations (treatment strategy):  1. Continue Diabetic diet  2. If PO intake <50%, add Boost glucose control TID  3. RD to monitor and follow    Nutrition Diagnosis Status:   New         Malnutrition Assessment  Malnutrition Context: acute illness or injury  Malnutrition Level: mild      Weight Loss (Malnutrition):  (7% x 1 month)     Reason for Assessment    Reason For Assessment: consult  Diagnosis:  (no principal problem)  Relevant Medical History: anemia, T2DM, metastatic renal cell carcinoma  Interdisciplinary Rounds: did not attend    General Information Comments:   Pt with hx of metastatic RCC s/p L nephrectomy, A1c 9.5. Spoke with pt and daughter at bedside. Reports pt with regular appetite. Unsure of UBW but stated notice wt loss since August. Per wt hx, pt weighed 179 lb in October,  lb indicated wt loss of 13 lb (7%) x 1 month. Unable to perform NFPE at this time. Pt meet criteria for mild malnutrition due to significant wt loss.     Nutrition Discharge Planning: Educated pt on CHO counting for people with diabetes on 11/17.  Pt verbalized understanding. Emphasized the importance of diet adherence. Pt with no additional questions. No other needs identified. Left all education material with pt at bedside.    Nutrition Risk Screen    Nutrition Risk Screen: no  "indicators present    Nutrition/Diet History    Spiritual, Cultural Beliefs, Orthodoxy Practices, Values that Affect Care: no    Anthropometrics    Temp: 98.4 °F (36.9 °C)  Height Method: Stated  Height: 5' 5" (165.1 cm)  Height (inches): 65 in  Weight Method: Bed Scale  Weight: 75.5 kg (166 lb 7.2 oz)  Weight (lb): 166.45 lb  Ideal Body Weight (IBW), Male: 136 lb  % Ideal Body Weight, Male (lb): 122.39 %  BMI (Calculated): 27.7     Lab/Procedures/Meds    Pertinent Labs Reviewed: reviewed  Pertinent Labs Comments: Na 132, glucose 345, BUN 39, Tpro 5.6, albumin 1.9,   Pertinent Medications Reviewed: reviewed  Pertinent Medications Comments: insulin, senna-docusate, famotidine    Estimated/Assessed Needs    Weight Used For Calorie Calculations: 75.3 kg (166 lb)  Energy Calorie Requirements (kcal): 1768 kcal  Energy Need Method: [place]-St Jeor (PAL 1.25)  Protein Requirements: 75-90g (1-1.2g/kg)  Weight Used For Protein Calculations: 75.3 kg (166 lb)  Fluid Requirements (mL): 1ml/kcal or per MD  Estimated Fluid Requirement Method: RDA Method  RDA Method (mL): 1768     Nutrition Prescription Ordered    Current Diet Order: Diabetic    Evaluation of Received Nutrient/Fluid Intake    I/O: + 983 ml since admit  Energy Calories Required: not meeting needs  Protein Required: not meeting needs  Comments: LBM 11/15  Tolerance: tolerating  % Intake of Estimated Energy Needs: 50 - 75 %  % Meal Intake: 50 - 75 %    Nutrition Risk    Level of Risk/Frequency of Follow-up:  (1x/week)     Monitor and Evaluation    Food and Nutrient Intake: energy intake, food and beverage intake  Food and Nutrient Adminstration: diet order  Knowledge/Beliefs/Attitudes: food and nutrition knowledge/skill  Physical Activity and Function: nutrition-related ADLs and IADLs  Anthropometric Measurements: height/length, body mass index, weight, weight change  Biochemical Data, Medical Tests and Procedures: electrolyte and renal panel, gastrointestinal " profile, glucose/endocrine profile, inflammatory profile, lipid profile  Nutrition-Focused Physical Findings: overall appearance     Nutrition Follow-Up    RD Follow-up?: Yes

## 2023-11-17 NOTE — ASSESSMENT & PLAN NOTE
"The patient has hypercalcemia that is currently uncontrolled. The patient has the following symptoms due to their hypercalcemia: weakness and encephalopathy. The hypercalcemia is likely due to Malignancy. We will obtain the following labs to work up the hypercalcemia: PTH No results found for: "PTH" . We will treat the hypercalcemia with: IV fluids ordered at a rate of 150 ml/hr. Their latest calcium has been reviewed and is listed below.  Ionized Calcium   Date Value Ref Range Status   11/16/2023 1.34 1.06 - 1.42 mmol/L Final     - normalized with IV Hydration.   "

## 2023-11-17 NOTE — SUBJECTIVE & OBJECTIVE
Oncology Treatment Plan:   [No matching plan found]    Medications:  Continuous Infusions:  Scheduled Meds:   ciprofloxacin HCl  500 mg Oral BID    dexAMETHasone  4 mg Oral Q8H    enoxparin  40 mg Subcutaneous Daily    famotidine  20 mg Oral BID    insulin aspart U-100  14 Units Subcutaneous TIDWM    insulin detemir U-100  22 Units Subcutaneous QHS    levothyroxine  0.2 mg Oral Before breakfast    senna-docusate 8.6-50 mg  1 tablet Oral BID     PRN Meds:acetaminophen, dextrose 10%, dextrose 10%, glucagon (human recombinant), glucose, glucose, HYDROcodone-acetaminophen, insulin aspart U-100, melatonin, sodium chloride 0.9%     Review of patient's allergies indicates:   Allergen Reactions    Iodinated contrast media Rash     Delayed non itching rash after CT scan.    Per Nsg Notes 4/30/19 - premed with Benadryl 25mg tab, and no reaction noted post CT.   8/6/19 did well with Benadryl 25 mg PO x1        No past medical history on file.  Past Surgical History:   Procedure Laterality Date    ENDOSCOPY OF PROXIMAL SMALL INTESTINE N/A 8/18/2022    Procedure: Upper DBE with general anesthesia;  Surgeon: Michael Silveira MD;  Location: [facility];  Service: Endoscopy;  Laterality: N/A;    RADICAL NEPHRECTOMY Right 08/18/2016    THYROIDECTOMY       Family History    None       Tobacco Use    Smoking status: Never    Smokeless tobacco: Not on file   Substance and Sexual Activity    Alcohol use: Not on file    Drug use: Not on file    Sexual activity: Not on file       Review of Systems   Constitutional:  Negative for activity change, appetite change and fever.   HENT:  Negative for congestion and dental problem.    Eyes:  Negative for photophobia and visual disturbance.   Respiratory:  Negative for apnea and chest tightness.    Cardiovascular:  Negative for chest pain and leg swelling.   Gastrointestinal:  Negative for abdominal distention and abdominal pain.   Genitourinary:  Negative for difficulty urinating and dysuria.    Musculoskeletal:  Negative for arthralgias and back pain.   Skin:  Negative for color change and rash.   Neurological:  Negative for dizziness.   Hematological:  Negative for adenopathy.   Psychiatric/Behavioral:  Negative for hallucinations. The patient is not nervous/anxious.      Objective:     Vital Signs (Most Recent):  Temp: 98.4 °F (36.9 °C) (11/17/23 1625)  Pulse: 73 (11/17/23 1625)  Resp: 20 (11/17/23 1625)  BP: 134/63 (11/17/23 1625)  SpO2: 96 % (11/17/23 1625) Vital Signs (24h Range):  Temp:  [97.2 °F (36.2 °C)-98.4 °F (36.9 °C)] 98.4 °F (36.9 °C)  Pulse:  [59-73] 73  Resp:  [16-20] 20  SpO2:  [94 %-97 %] 96 %  BP: (113-144)/(55-65) 134/63     Weight: 75.5 kg (166 lb 7.2 oz)  Body mass index is 27.7 kg/m².  Body surface area is 1.86 meters squared.      Intake/Output Summary (Last 24 hours) at 11/17/2023 1648  Last data filed at 11/17/2023 0438  Gross per 24 hour   Intake 282.5 ml   Output 500 ml   Net -217.5 ml        Physical Exam  Constitutional:       General: He is not in acute distress.     Appearance: He is not ill-appearing.   HENT:      Head: Normocephalic and atraumatic.   Eyes:      General: No scleral icterus.     Extraocular Movements: Extraocular movements intact.   Cardiovascular:      Rate and Rhythm: Normal rate and regular rhythm.   Pulmonary:      Effort: Pulmonary effort is normal. No respiratory distress.   Abdominal:      General: There is no distension.      Palpations: Abdomen is soft.   Musculoskeletal:         General: No swelling or deformity.      Cervical back: Normal range of motion and neck supple.   Skin:     Coloration: Skin is not jaundiced.   Neurological:      General: No focal deficit present.      Mental Status: He is alert.      Cranial Nerves: No cranial nerve deficit.   Psychiatric:         Mood and Affect: Mood normal.         Behavior: Behavior normal.          Significant Labs:   CBC:   Recent Labs   Lab 11/16/23  1016 11/17/23  0338   WBC 11.54 9.28   HGB  9.2* 7.9*   HCT 33.9* 28.6*   PLT 98* 77*    and CMP:   Recent Labs   Lab 11/16/23  1016 11/17/23  0338   * 132*   K 4.8 4.4    102   CO2 25 22*   * 345*   BUN 37* 39*   CREATININE 0.9 1.0   CALCIUM 10.3 9.8   PROT 6.1 5.6*   ALBUMIN 2.1* 1.9*   BILITOT 0.5 0.3   ALKPHOS 174* 155*   AST 12 13   ALT 29 30   ANIONGAP 9 8       Diagnostic Results:  I have reviewed all pertinent imaging results/findings within the past 24 hours.

## 2023-11-18 LAB
ALBUMIN SERPL BCP-MCNC: 1.8 G/DL (ref 3.5–5.2)
ALP SERPL-CCNC: 149 U/L (ref 55–135)
ALT SERPL W/O P-5'-P-CCNC: 31 U/L (ref 10–44)
ANION GAP SERPL CALC-SCNC: 7 MMOL/L (ref 8–16)
AST SERPL-CCNC: 13 U/L (ref 10–40)
BASOPHILS # BLD AUTO: 0.01 K/UL (ref 0–0.2)
BASOPHILS NFR BLD: 0.1 % (ref 0–1.9)
BILIRUB SERPL-MCNC: 0.4 MG/DL (ref 0.1–1)
BUN SERPL-MCNC: 30 MG/DL (ref 8–23)
CALCIUM SERPL-MCNC: 9.6 MG/DL (ref 8.7–10.5)
CHLORIDE SERPL-SCNC: 103 MMOL/L (ref 95–110)
CO2 SERPL-SCNC: 23 MMOL/L (ref 23–29)
CREAT SERPL-MCNC: 0.8 MG/DL (ref 0.5–1.4)
DAT IGG-SP REAG RBC-IMP: NORMAL
DIFFERENTIAL METHOD: ABNORMAL
EOSINOPHIL # BLD AUTO: 0 K/UL (ref 0–0.5)
EOSINOPHIL NFR BLD: 0 % (ref 0–8)
ERYTHROCYTE [DISTWIDTH] IN BLOOD BY AUTOMATED COUNT: 18.6 % (ref 11.5–14.5)
EST. GFR  (NO RACE VARIABLE): >60 ML/MIN/1.73 M^2
FIBRINOGEN PPP-MCNC: 541 MG/DL (ref 182–400)
FOLATE SERPL-MCNC: 7.7 NG/ML (ref 4–24)
GLUCOSE SERPL-MCNC: 225 MG/DL (ref 70–110)
HAPTOGLOB SERPL-MCNC: 317 MG/DL (ref 30–250)
HCT VFR BLD AUTO: 27.7 % (ref 40–54)
HGB BLD-MCNC: 7.5 G/DL (ref 14–18)
IMM GRANULOCYTES # BLD AUTO: 0.15 K/UL (ref 0–0.04)
IMM GRANULOCYTES NFR BLD AUTO: 1.5 % (ref 0–0.5)
INR PPP: 1.1 (ref 0.8–1.2)
LDH SERPL L TO P-CCNC: 261 U/L (ref 110–260)
LYMPHOCYTES # BLD AUTO: 0.3 K/UL (ref 1–4.8)
LYMPHOCYTES NFR BLD: 2.6 % (ref 18–48)
MCH RBC QN AUTO: 24.2 PG (ref 27–31)
MCHC RBC AUTO-ENTMCNC: 27.1 G/DL (ref 32–36)
MCV RBC AUTO: 89 FL (ref 82–98)
MONOCYTES # BLD AUTO: 0.3 K/UL (ref 0.3–1)
MONOCYTES NFR BLD: 3.4 % (ref 4–15)
NEUTROPHILS # BLD AUTO: 9 K/UL (ref 1.8–7.7)
NEUTROPHILS NFR BLD: 92.4 % (ref 38–73)
NRBC BLD-RTO: 0 /100 WBC
PATH REV BLD -IMP: NORMAL
PLATELET # BLD AUTO: 70 K/UL (ref 150–450)
PMV BLD AUTO: 9.3 FL (ref 9.2–12.9)
POCT GLUCOSE: 270 MG/DL (ref 70–110)
POCT GLUCOSE: 284 MG/DL (ref 70–110)
POCT GLUCOSE: 366 MG/DL (ref 70–110)
POCT GLUCOSE: 366 MG/DL (ref 70–110)
POTASSIUM SERPL-SCNC: 4.3 MMOL/L (ref 3.5–5.1)
PROT SERPL-MCNC: 5.4 G/DL (ref 6–8.4)
PROTHROMBIN TIME: 12 SEC (ref 9–12.5)
RBC # BLD AUTO: 3.1 M/UL (ref 4.6–6.2)
RETICS/RBC NFR AUTO: 2.8 % (ref 0.4–2)
SODIUM SERPL-SCNC: 133 MMOL/L (ref 136–145)
VIT B12 SERPL-MCNC: 881 PG/ML (ref 210–950)
WBC # BLD AUTO: 9.77 K/UL (ref 3.9–12.7)

## 2023-11-18 PROCEDURE — 83010 ASSAY OF HAPTOGLOBIN QUANT: CPT | Performed by: INTERNAL MEDICINE

## 2023-11-18 PROCEDURE — 99214 PR OFFICE/OUTPT VISIT, EST, LEVL IV, 30-39 MIN: ICD-10-PCS | Mod: ,,, | Performed by: INTERNAL MEDICINE

## 2023-11-18 PROCEDURE — 99214 OFFICE O/P EST MOD 30 MIN: CPT | Mod: ,,, | Performed by: INTERNAL MEDICINE

## 2023-11-18 PROCEDURE — 99215 PR OFFICE/OUTPT VISIT, EST, LEVL V, 40-54 MIN: ICD-10-PCS | Mod: ,,, | Performed by: PSYCHIATRY & NEUROLOGY

## 2023-11-18 PROCEDURE — 85060 BLOOD SMEAR INTERPRETATION: CPT | Mod: ,,, | Performed by: PATHOLOGY

## 2023-11-18 PROCEDURE — 82607 VITAMIN B-12: CPT | Performed by: INTERNAL MEDICINE

## 2023-11-18 PROCEDURE — 86880 COOMBS TEST DIRECT: CPT | Performed by: INTERNAL MEDICINE

## 2023-11-18 PROCEDURE — 63600175 PHARM REV CODE 636 W HCPCS: Performed by: HOSPITALIST

## 2023-11-18 PROCEDURE — 80053 COMPREHEN METABOLIC PANEL: CPT | Performed by: INTERNAL MEDICINE

## 2023-11-18 PROCEDURE — 85025 COMPLETE CBC W/AUTO DIFF WBC: CPT | Performed by: INTERNAL MEDICINE

## 2023-11-18 PROCEDURE — 99215 OFFICE O/P EST HI 40 MIN: CPT | Mod: ,,, | Performed by: PSYCHIATRY & NEUROLOGY

## 2023-11-18 PROCEDURE — 63600175 PHARM REV CODE 636 W HCPCS: Performed by: STUDENT IN AN ORGANIZED HEALTH CARE EDUCATION/TRAINING PROGRAM

## 2023-11-18 PROCEDURE — 25500020 PHARM REV CODE 255: Performed by: HOSPITALIST

## 2023-11-18 PROCEDURE — 85045 AUTOMATED RETICULOCYTE COUNT: CPT | Performed by: INTERNAL MEDICINE

## 2023-11-18 PROCEDURE — 63600175 PHARM REV CODE 636 W HCPCS: Performed by: INTERNAL MEDICINE

## 2023-11-18 PROCEDURE — 85610 PROTHROMBIN TIME: CPT | Performed by: INTERNAL MEDICINE

## 2023-11-18 PROCEDURE — 85060 PATHOLOGIST REVIEW: ICD-10-PCS | Mod: ,,, | Performed by: PATHOLOGY

## 2023-11-18 PROCEDURE — 25000003 PHARM REV CODE 250: Performed by: INTERNAL MEDICINE

## 2023-11-18 PROCEDURE — 25000003 PHARM REV CODE 250: Performed by: HOSPITALIST

## 2023-11-18 PROCEDURE — 96367 TX/PROPH/DG ADDL SEQ IV INF: CPT

## 2023-11-18 PROCEDURE — 85384 FIBRINOGEN ACTIVITY: CPT | Performed by: INTERNAL MEDICINE

## 2023-11-18 PROCEDURE — A9585 GADOBUTROL INJECTION: HCPCS | Performed by: HOSPITALIST

## 2023-11-18 PROCEDURE — 96372 THER/PROPH/DIAG INJ SC/IM: CPT | Performed by: STUDENT IN AN ORGANIZED HEALTH CARE EDUCATION/TRAINING PROGRAM

## 2023-11-18 PROCEDURE — 82746 ASSAY OF FOLIC ACID SERUM: CPT | Performed by: INTERNAL MEDICINE

## 2023-11-18 PROCEDURE — 36415 COLL VENOUS BLD VENIPUNCTURE: CPT | Performed by: INTERNAL MEDICINE

## 2023-11-18 PROCEDURE — 83615 LACTATE (LD) (LDH) ENZYME: CPT | Performed by: INTERNAL MEDICINE

## 2023-11-18 PROCEDURE — G0378 HOSPITAL OBSERVATION PER HR: HCPCS

## 2023-11-18 RX ORDER — PANTOPRAZOLE SODIUM 20 MG/1
40 TABLET, DELAYED RELEASE ORAL
Status: DISCONTINUED | OUTPATIENT
Start: 2023-11-18 | End: 2023-11-19 | Stop reason: HOSPADM

## 2023-11-18 RX ORDER — INSULIN ASPART 100 [IU]/ML
0-15 INJECTION, SOLUTION INTRAVENOUS; SUBCUTANEOUS
Status: DISCONTINUED | OUTPATIENT
Start: 2023-11-19 | End: 2023-11-19 | Stop reason: HOSPADM

## 2023-11-18 RX ORDER — IBUPROFEN 200 MG
16 TABLET ORAL
Status: DISCONTINUED | OUTPATIENT
Start: 2023-11-18 | End: 2023-11-19

## 2023-11-18 RX ORDER — INSULIN ASPART 100 [IU]/ML
0-15 INJECTION, SOLUTION INTRAVENOUS; SUBCUTANEOUS
Status: DISCONTINUED | OUTPATIENT
Start: 2023-11-18 | End: 2023-11-18

## 2023-11-18 RX ORDER — GLUCAGON 1 MG
1 KIT INJECTION
Status: DISCONTINUED | OUTPATIENT
Start: 2023-11-18 | End: 2023-11-19

## 2023-11-18 RX ORDER — GADOBUTROL 604.72 MG/ML
8 INJECTION INTRAVENOUS
Status: COMPLETED | OUTPATIENT
Start: 2023-11-18 | End: 2023-11-18

## 2023-11-18 RX ORDER — IBUPROFEN 200 MG
24 TABLET ORAL
Status: DISCONTINUED | OUTPATIENT
Start: 2023-11-18 | End: 2023-11-19

## 2023-11-18 RX ADMIN — SENNOSIDES AND DOCUSATE SODIUM 1 TABLET: 8.6; 5 TABLET ORAL at 08:11

## 2023-11-18 RX ADMIN — INSULIN ASPART 14 UNITS: 100 INJECTION, SOLUTION INTRAVENOUS; SUBCUTANEOUS at 08:11

## 2023-11-18 RX ADMIN — CIPROFLOXACIN HYDROCHLORIDE 500 MG: 500 TABLET, FILM COATED ORAL at 09:11

## 2023-11-18 RX ADMIN — ZOLEDRONIC ACID 4 MG: 4 INJECTION, SOLUTION, CONCENTRATE INTRAVENOUS at 03:11

## 2023-11-18 RX ADMIN — HUMAN INSULIN 15 UNITS: 100 INJECTION, SUSPENSION SUBCUTANEOUS at 09:11

## 2023-11-18 RX ADMIN — DEXAMETHASONE 4 MG: 4 TABLET ORAL at 09:11

## 2023-11-18 RX ADMIN — LEVOTHYROXINE SODIUM 200 MCG: 100 TABLET ORAL at 05:11

## 2023-11-18 RX ADMIN — HUMAN INSULIN 15 UNITS: 100 INJECTION, SUSPENSION SUBCUTANEOUS at 03:11

## 2023-11-18 RX ADMIN — INSULIN ASPART 9 UNITS: 100 INJECTION, SOLUTION INTRAVENOUS; SUBCUTANEOUS at 12:11

## 2023-11-18 RX ADMIN — PANTOPRAZOLE SODIUM 40 MG: 20 TABLET, DELAYED RELEASE ORAL at 05:11

## 2023-11-18 RX ADMIN — INSULIN ASPART 15 UNITS: 100 INJECTION, SOLUTION INTRAVENOUS; SUBCUTANEOUS at 09:11

## 2023-11-18 RX ADMIN — DEXAMETHASONE 4 MG: 4 TABLET ORAL at 05:11

## 2023-11-18 RX ADMIN — SENNOSIDES AND DOCUSATE SODIUM 1 TABLET: 8.6; 5 TABLET ORAL at 09:11

## 2023-11-18 RX ADMIN — INSULIN ASPART 6 UNITS: 100 INJECTION, SOLUTION INTRAVENOUS; SUBCUTANEOUS at 08:11

## 2023-11-18 RX ADMIN — DEXAMETHASONE 4 MG: 4 TABLET ORAL at 01:11

## 2023-11-18 RX ADMIN — INSULIN ASPART 15 UNITS: 100 INJECTION, SOLUTION INTRAVENOUS; SUBCUTANEOUS at 05:11

## 2023-11-18 RX ADMIN — GADOBUTROL 8 ML: 604.72 INJECTION INTRAVENOUS at 02:11

## 2023-11-18 RX ADMIN — FAMOTIDINE 20 MG: 20 TABLET, FILM COATED ORAL at 08:11

## 2023-11-18 RX ADMIN — CIPROFLOXACIN HYDROCHLORIDE 500 MG: 500 TABLET, FILM COATED ORAL at 08:11

## 2023-11-18 NOTE — PLAN OF CARE
Problem: Adult Inpatient Plan of Care  Goal: Plan of Care Review  Outcome: Ongoing, Progressing     Problem: Adult Inpatient Plan of Care  Goal: Optimal Comfort and Wellbeing  Outcome: Ongoing, Progressing     Problem: Skin Injury Risk Increased  Goal: Skin Health and Integrity  Outcome: Ongoing, Progressing

## 2023-11-18 NOTE — SUBJECTIVE & OBJECTIVE
Review of Systems   Constitutional:  Negative for activity change and appetite change.   Respiratory:  Negative for apnea and chest tightness.    Cardiovascular:  Negative for chest pain.   Gastrointestinal:  Negative for abdominal distention.   Genitourinary:  Negative for difficulty urinating and dysuria.   Neurological:  Negative for dizziness.   Hematological:  Negative for adenopathy.   Psychiatric/Behavioral:  Positive for behavioral problems.      Objective:     Vital Signs (Most Recent):  Temp: 97.6 °F (36.4 °C) (11/18/23 1555)  Pulse: 71 (11/18/23 1555)  Resp: 20 (11/18/23 1555)  BP: 137/64 (11/18/23 1555)  SpO2: (!) 94 % (11/18/23 1555) Vital Signs (24h Range):  Temp:  [97.5 °F (36.4 °C)-98.1 °F (36.7 °C)] 97.6 °F (36.4 °C)  Pulse:  [66-73] 71  Resp:  [18-24] 20  SpO2:  [90 %-98 %] 94 %  BP: (123-138)/(59-64) 137/64     Weight: 75.5 kg (166 lb 7.2 oz)  Body mass index is 27.7 kg/m².    Intake/Output Summary (Last 24 hours) at 11/18/2023 1749  Last data filed at 11/18/2023 0600  Gross per 24 hour   Intake 480 ml   Output 400 ml   Net 80 ml         Physical Exam  Constitutional:       General: He is not in acute distress.     Appearance: He is not ill-appearing.   Cardiovascular:      Rate and Rhythm: Normal rate and regular rhythm.      Heart sounds: No murmur heard.  Pulmonary:      Effort: No respiratory distress.   Abdominal:      General: There is no distension.      Palpations: There is no mass.   Neurological:      General: No focal deficit present.      Mental Status: He is alert.      Comments: Oriented most of the time but forgetful and at times talking nonsense             Recent Labs   Lab 11/17/23 0338 11/18/23  0301   WBC 9.28 9.77   HGB 7.9* 7.5*   HCT 28.6* 27.7*   PLT 77* 70*       Recent Labs   Lab 11/17/23 0338 11/18/23  0301   * 133*   K 4.4 4.3    103   CO2 22* 23   * 225*   BUN 39* 30*   CREATININE 1.0 0.8   CALCIUM 9.8 9.6   PROT 5.6* 5.4*   ALBUMIN 1.9* 1.8*    BILITOT 0.3 0.4   ALKPHOS 155* 149*   AST 13 13   ALT 30 31   ANIONGAP 8 7*

## 2023-11-18 NOTE — CONSULTS
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 Diagnosed in 7/16, he underwent a left nephrectomy and was in remission until 8/20 when a pleural based nodule was identified. Progression was noted with a sacroiliac lytic lesion in 5/21, leading to hypercalcemia and GIB, necessitating chronic transfusions. Multiple intracerebral lesions were recently discovered, and he underwent gamma knife surgery for a frontal cerebral lesion. Dr. 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 ISIDRO Michael for cancer care and was previously treated at [facility] by Dr. Corado and Dr. Hinson.    Recently, he experienced increased confusion, leading to suspicion of a UTI, for which Ciprofloxacin was started empirically. He has a history of hypercalcemia of malignancy, initially treated with Xgeva, resulting in initial hypocalcemia, now with rising Ca 2+. Following Dexamethasone administration for intracerebral lesions, profound hyperglycemia (BG up to 800mg/dl) was noted, prompting a dose reduction.    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 The ED evaluation revealed new thrombocytopenia and a corrected calcium of 11.8. CT Head showed no new findings beyond previously identified lesions. He was treated with IV  "Ciprofloxacin and 1L LR and admitted.    During my visit, his daughter and wife provided most of the history. Dr. Coppola was alert and conversational with good awareness of his situation, but on rare occasion, presented inaccurate information. Dr. Morales, a family friend, has been involved in outpatient management.    Neuro exam notable for proximal bilateral weakness, but otherwise fairly unremarkable.     No past medical history on file.    Past Surgical History:   Procedure Laterality Date    ENDOSCOPY OF PROXIMAL SMALL INTESTINE N/A 8/18/2022    Procedure: Upper DBE with general anesthesia;  Surgeon: Michael Silveira MD;  Location: [facility];  Service: Endoscopy;  Laterality: N/A;    RADICAL NEPHRECTOMY Right 08/18/2016    THYROIDECTOMY         Review of patient's allergies indicates:   Allergen Reactions    Iodinated contrast media Rash     Delayed non itching rash after CT scan.    Per Nsg Notes 4/30/19 - premed with Benadryl 25mg tab, and no reaction noted post CT.   8/6/19 did well with Benadryl 25 mg PO x1       Current Neurological Medications:   Dexamethasone    No current facility-administered medications on file prior to encounter.     Current Outpatient Medications on File Prior to Encounter   Medication Sig    [DISCONTINUED] BD DAY 2ND GEN PEN NEEDLE 32 gauge x 5/32" Ndle USE FOUR TIMES DAILY WITH INSULIN    [DISCONTINUED] benzonatate (TESSALON) 100 MG capsule Take 100 mg by mouth 3 (three) times daily.    [DISCONTINUED] CABOMETYX 40 mg Tab     [DISCONTINUED] cabozantinib (CABOMETYX) 60 mg Tab     [DISCONTINUED] dexamethasone (DECADRON ORAL)     [DISCONTINUED] dexAMETHasone (DECADRON) 4 MG Tab     [DISCONTINUED] diphenoxylate-atropine 2.5-0.025 mg (LOMOTIL) 2.5-0.025 mg per tablet Take 2 tablets by mouth every 6 (six) hours as needed.    [DISCONTINUED] furosemide (LASIX) 20 MG tablet Take 20 mg by mouth.    [DISCONTINUED] HYDROcodone-acetaminophen (NORCO) 5-325 mg per tablet Take 1 tablet by mouth " every 6 (six) hours as needed.    [DISCONTINUED] hydroxychloroquine (PLAQUENIL) 200 mg tablet Take 200 mg by mouth 2 (two) times daily.    [DISCONTINUED] icosapent ethyL (VASCEPA) 1 gram Cap     [DISCONTINUED] LEVEMIR FLEXPEN 100 unit/mL (3 mL) InPn pen SMARTSIG:10 Unit(s) SUB-Q Twice Daily    [DISCONTINUED] loperamide (IMODIUM A-D) 2 mg Tab Take 2 mg by mouth.    [DISCONTINUED] NOVOLOG FLEXPEN U-100 INSULIN 100 unit/mL (3 mL) InPn pen USE WITH SLIDING SCALE 4 TIMES DAILY    [DISCONTINUED] NOVOLOG MIX 70-30FLEXPEN U-100 100 unit/mL (70-30) InPn pen     [DISCONTINUED] pravastatin (PRAVACHOL) 20 MG tablet Take 20 mg by mouth every evening.    [DISCONTINUED] TRUE METRIX GLUCOSE METER Misc use as directed    [DISCONTINUED] TRUEPLUS LANCETS 33 gauge Misc Apply topically 2 (two) times daily.    ciprofloxacin HCl (CIPRO) 500 MG tablet Take 1 tablet (500 mg total) by mouth 2 (two) times daily. for 10 days    dexAMETHasone (DECADRON) 2 MG tablet Take 2 mg by mouth 2 times daily with meals.    esomeprazole (NEXIUM) 40 MG capsule Take 40 mg by mouth 2 (two) times daily.    famotidine-calcium carbonate-magnesium hydroxide (PEPCID COMPLETE) chewable tablet Take 1 tablet by mouth nightly as needed.    levothyroxine (SYNTHROID) 200 MCG tablet Take 200 mcg by mouth before breakfast.    ondansetron (ZOFRAN-ODT) 4 MG TbDL DISSOLVE 1 TABLET ON THE TONGUE EVERY 4 HOURS AS NEEDED FOR NAUSEA    [DISCONTINUED] apremilast (OTEZLA) 30 mg Tab Take 30 mg by mouth 2 (two) times daily.    [DISCONTINUED] apremilast (OTEZLA) 30 mg Tab Take 1 tablet by mouth 2 (two) times daily.    [DISCONTINUED] ascorbic acid, vitamin C, (VITAMIN C) 1000 MG tablet Take 1 tablet by mouth once daily.    [DISCONTINUED] blood sugar diagnostic Strp 1 strip by Misc.(Non-Drug; Combo Route) route 2 (two) times daily with meals.    [DISCONTINUED] blood-glucose meter kit Use as instructed    [DISCONTINUED] cholecalciferol, vitamin D3, (VITAMIN D3) 50 mcg (2,000 unit) Cap  "capsule Take 2,000 Units by mouth.    [DISCONTINUED] chondroitin sulfate A sodium 400 mg Cap Take 2 tablets by mouth once daily.    [DISCONTINUED] crisaborole (EUCRISA) 2 % Oint Apply 1 Application topically.    [DISCONTINUED] fexofenadine (ALLEGRA) 180 MG tablet Take 1 tablet by mouth every evening.    [DISCONTINUED] fluconazole (DIFLUCAN) 200 MG Tab Take 200 mg by mouth.    [DISCONTINUED] ibuprofen (ADVIL,MOTRIN) 200 MG tablet Take 200 mg by mouth.    [DISCONTINUED] insulin aspart U-100 (NOVOLOG U-100 INSULIN ASPART) 100 unit/mL injection Inject 5 Units into the skin 3 (three) times daily before meals.    [DISCONTINUED] insulin aspart U-100 (NOVOLOG U-100 INSULIN ASPART) 100 unit/mL injection Moderate dose: Novolog correction based on before meal glucose: 150-200: add 2 units 201-250: add 4 units 251-300: add 6 units 301-350: add 8 units >350: add 10 units    [DISCONTINUED] insulin detemir U-100 (LEVEMIR) 100 unit/mL injection Inject 10 Units into the skin 2 (two) times a day.    [DISCONTINUED] insulin syringe-needle U-100 0.3 mL 30 gauge x 5/16" Syrg 1 each by Misc.(Non-Drug; Combo Route) route 2 (two) times daily with meals.    [DISCONTINUED] irbesartan (AVAPRO) 150 MG tablet     [DISCONTINUED] lancets Misc 1 lancet  by Misc.(Non-Drug; Combo Route) route 2 (two) times daily with meals.    [DISCONTINUED] lancing device Misc 1 Device by Misc.(Non-Drug; Combo Route) route 2 (two) times daily with meals.    [DISCONTINUED] lenvatinib (LENVIMA) 14 mg/day(10 mg x 1-4 mg x 1) Cap Take by mouth.    [DISCONTINUED] lenvatinib (LENVIMA) 14 mg/day(10 mg x 1-4 mg x 1) Cap Take 14 mg by mouth daily as needed.    [DISCONTINUED] lenvatinib (LENVIMA) 14 mg/day(10 mg x 1-4 mg x 1) Cap Take 14 mg by mouth daily as needed.    [DISCONTINUED] metformin (GLUCOPHAGE) 500 MG tablet     [DISCONTINUED] multivitamin (THERAGRAN) per tablet Take 1 tablet by mouth.    [DISCONTINUED] multivitamin (THERAGRAN) per tablet Take 1 tablet by mouth " "once daily.    [DISCONTINUED] nivolumab 100 mg/10 mL injection Inject into the vein.    [DISCONTINUED] nivolumab 40 mg/4 mL injection Inject into the vein.    [DISCONTINUED] pembrolizumab (KEYTRUDA IV) Inject into the vein.    [DISCONTINUED] pen needle, diabetic 31 gauge x 5/16" Ndle 1 each by Misc.(Non-Drug; Combo Route) route 2 (two) times daily with meals.    [DISCONTINUED] pravastatin (PRAVACHOL) 40 MG tablet Take 20 mg by mouth once daily. Taking 1/2 of 40 mg tablet daily      [DISCONTINUED] triamterene-hydrochlorothiazide 37.5-25 mg (MAXZIDE-25) 37.5-25 mg per tablet      Family History    None       Tobacco Use    Smoking status: Never    Smokeless tobacco: Not on file   Substance and Sexual Activity    Alcohol use: Not on file    Drug use: Not on file    Sexual activity: Not on file     Review of Systems   Constitutional:  Negative for activity change, chills and fever.   HENT:  Negative for hearing loss and sore throat.    Eyes:  Negative for pain and visual disturbance.   Respiratory:  Negative for cough and shortness of breath.    Cardiovascular:  Negative for chest pain and palpitations.   Gastrointestinal:  Negative for abdominal distention, abdominal pain, nausea and vomiting.   Genitourinary:  Negative for decreased urine volume, difficulty urinating and dysuria.   Musculoskeletal:  Negative for arthralgias and neck stiffness.   Skin:  Negative for color change and rash.   Neurological:  Positive for seizures. Negative for dizziness, weakness and headaches.   Psychiatric/Behavioral:  Negative for confusion.      Objective:     Vital Signs (Most Recent):  Temp: 98.1 °F (36.7 °C) (11/17/23 1925)  Pulse: 70 (11/17/23 1925)  Resp: 18 (11/17/23 1925)  BP: (!) 123/59 (11/17/23 1925)  SpO2: 95 % (11/17/23 1925) Vital Signs (24h Range):  Temp:  [97.2 °F (36.2 °C)-98.4 °F (36.9 °C)] 98.1 °F (36.7 °C)  Pulse:  [59-73] 70  Resp:  [16-20] 18  SpO2:  [94 %-96 %] 95 %  BP: (113-144)/(55-65) 123/59     Weight: 75.5 " kg (166 lb 7.2 oz)  Body mass index is 27.7 kg/m².     Physical Exam  Vitals and nursing note reviewed.   Constitutional:       General: He is not in acute distress.     Appearance: Normal appearance. He is not toxic-appearing.   HENT:      Head: Normocephalic.      Nose: No congestion.      Mouth/Throat:      Mouth: Mucous membranes are moist.      Pharynx: No oropharyngeal exudate.   Eyes:      General: No scleral icterus.  Cardiovascular:      Rate and Rhythm: Normal rate.      Pulses: Normal pulses.   Pulmonary:      Effort: Pulmonary effort is normal. No respiratory distress.   Abdominal:      General: Abdomen is flat. There is no distension.      Palpations: Abdomen is soft.      Tenderness: There is no abdominal tenderness.   Skin:     General: Skin is warm and dry.      Coloration: Skin is not jaundiced.   Psychiatric:         Mood and Affect: Mood normal.       Neurological Exam:  MENTAL STATUS  Level of consciousness: alert  Orientation: oriented to person, place, month and day but not year  Attention normal. Concentration normal.  Speech: normal    CRANIAL NERVES  CN II: Visual fields full to confrontation  CN III, IV, VI: PERRL, EOMI  CN V: Facial sensation intact  CN VII: Facial expression symmetric and full  CN VIII: Hearing intact to finger rub  CN IX, X: Symmetric palate elevation. Phonation normal  CN XI: Shoulder shrug and head turn intact bilaterally  CN XII: Tongue midline with normal movements, no atrophy    MOTOR EXAM  Muscle bulk: normal  Muscle tone: normal  Pronator drift: absent    Strength - Upper Extremities   Arm abduction Elbow flexion Elbow extension Wrist flexion Wrist extension Finger abduction   Right 5/5 5/5 5/5 5/5 5/5 5/5   Left 5/5 5/5 5/5 5/5 5/5 5/5     Strength - Lower Extremities   Hip flexion Knee flexion Knee extension Dorsiflexion Plantarflexion   Right 2/5 4/5 4/5 4/5 3/5   Left 2/5 4/5 4/5 4/5 3/5       REFLEXES   Biceps Triceps Brachioradialis Patellar Achilles   Right  +2 +2 +2 +1 +2   Left +2 +2 +2 +1 +2     Toes equivocal bilaterally    SENSORY EXAM  Light touch: intact in all 4 extremities  Temperature: Light touch: intact in all 4 extremities    COORDINATION  Finger to nose: normal             Significant Labs: All pertinent lab results from the past 24 hours have been reviewed.    Significant Imaging: I have reviewed all pertinent imaging results/findings within the past 24 hours.  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 Diagnosed initially in 2016, he has undergone various treatments, including nephrectomy and gamma knife surgery. Currently, his symptoms include tangential thoughts and a focus on non-medical topics, suggesting cortical involvement. His recent ED visit revealed thrombocytopenia and elevated calcium levels, although a CT scan showed no new cerebral lesions. Overall, Dr. Coppola's condition is complex, with neurological symptoms likely influenced by his oncological history, recent infections, and metabolic disturbances.    From a neurological standpoint, the neurology team assesses the cause of his symptoms as multifactorial, with contributions from the UTI, brain metastasis post-radiation, hyperglycemia,, and slight hyponatremia. Given the potential risk of seizures in this patient, a routine EEG is warranted to evaluate for a seizure focus or susceptibility. This evaluation will help determine the need for antiseizure medications. If the EEG is negative, no further seizure-related workup will be necessary. The neurology team plans to follow Dr. Coppola's case peripherally, considering the complex interplay of his multiple medical conditions.    Recommendations  -- Routine EEG  -- If negative, no further inpatient neurologic workup needed, otherwise, will consider AEDs  -- Thank you for the consult. Neurology will follow up  peripherally        VTE Risk Mitigation (From admission, onward)           Ordered     enoxaparin injection 40 mg  Daily         11/16/23 1341     IP VTE HIGH RISK PATIENT  Once         11/16/23 1341     Place sequential compression device  Until discontinued         11/16/23 1341                    Thank you for your consult.     Marvin Avalos MD  Neurology  [facility] - Neurosurgery ([facility])

## 2023-11-18 NOTE — ASSESSMENT & PLAN NOTE
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 Diagnosed initially in 2016, he has undergone various treatments, including nephrectomy and gamma knife surgery. Currently, his symptoms include tangential thoughts and a focus on non-medical topics, suggesting cortical involvement. His recent ED visit revealed thrombocytopenia and elevated calcium levels, although a CT scan showed no new cerebral lesions. Overall, Dr. Coppola's condition is complex, with neurological symptoms likely influenced by his oncological history, recent infections, and metabolic disturbances.    From a neurological standpoint, the neurology team assesses the cause of his symptoms as multifactorial, with contributions from the UTI, brain metastasis post-radiation, hyperglycemia,, and slight hyponatremia. Given the potential risk of seizures in this patient, a routine EEG is warranted to evaluate for a seizure focus or susceptibility. This evaluation will help determine the need for antiseizure medications. If the EEG is negative, no further seizure-related workup will be necessary. The neurology team plans to follow Dr. Coppola's case peripherally, considering the complex interplay of his multiple medical conditions.    Recommendations  -- Routine EEG  -- If negative, no further inpatient neurologic workup needed, otherwise, will consider AEDs  -- Thank you for the consult. Neurology will follow up peripherally

## 2023-11-18 NOTE — PROGRESS NOTES
"Julio Thao - Neurosurgery ([facility])  Endocrinology  Progress Note    Admit Date: 2023     Mark Coppola is a 73 y.o. 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 Labs obtained two days ago were positive for a corrected calcium of 12.8. Patient is a poor historian owing to recent worsening of confusion potentially in the setting of UTI. I spoke with patients leonora Pitts at bedside.    Endocrinology is consulted to assist with management of hyperglycemia.    Patient was started on dexamethasone for progression of brain mets and he underwent gamma knife with Dr. Ragland on 11/10/23. Since starting steroids he has had significant hyperglycemia for which he was started on BID levemir and mealtime novolog per his primary card doctor. He has had persistently elevated blood glucose levels in the 300s at home and is wearing a DEXCOM now. Hong states that he is not sure regarding patients compliance with insulin injections at home. Patient does not think that he is taking any levemir.    Additional review of the chart shows hypothyroidism. Family at bedside report that this has not been addressed.            Interval HPI:   Overnight events: No acute events overnight. Patient is agreeable to remain in the hospital. Neurosurgery starting pulse dose steroids (decadron 4 mg Q8). Mealtime novolog increased once steroid doses were adjusted. BG trends: 225-319  Eatin%  Nausea: No  Hypoglycemia and intervention: No  Fever: No  TPN and/or TF: No    BP (!) 128/59 (BP Location: Right arm, Patient Position: Lying)   Pulse 66   Temp 97.5 °F (36.4 °C) (Oral)   Resp 18   Ht 5' 5" (1.651 m)   Wt 75.5 kg (166 lb 7.2 oz)   SpO2 96%   BMI 27.70 kg/m²     Labs Reviewed and Include    Recent Labs " "  Lab 11/18/23  0301   *   CALCIUM 9.6   ALBUMIN 1.8*   PROT 5.4*   *   K 4.3   CO2 23      BUN 30*   CREATININE 0.8   ALKPHOS 149*   ALT 31   AST 13   BILITOT 0.4     Lab Results   Component Value Date    WBC 9.77 11/18/2023    HGB 7.5 (L) 11/18/2023    HCT 27.7 (L) 11/18/2023    MCV 89 11/18/2023    PLT 70 (L) 11/18/2023     Recent Labs   Lab 11/17/23  0338   TSH 0.013*   FREET4 1.03     Lab Results   Component Value Date    HGBA1C 9.5 (H) 11/16/2023       Nutritional status:   Body mass index is 27.7 kg/m².  Lab Results   Component Value Date    ALBUMIN 1.8 (L) 11/18/2023    ALBUMIN 1.9 (L) 11/17/2023    ALBUMIN 2.1 (L) 11/16/2023     No results found for: "PREALBUMIN"    Estimated Creatinine Clearance: 75.7 mL/min (based on SCr of 0.8 mg/dL).    Accu-Checks  Recent Labs     11/16/23  1544 11/17/23  0009 11/17/23  0807 11/17/23  1217 11/17/23  1740 11/17/23  2145 11/18/23  0738   POCTGLUCOSE 291* 336* 294* 206* 299* 319* 270*       Current Medications and/or Treatments Impacting Glycemic Control  Immunotherapy:    Immunosuppressants       None          Steroids:   Hormones (From admission, onward)      Start     Stop Route Frequency Ordered    11/17/23 2200  dexAMETHasone tablet 4 mg         -- Oral Every 8 hours 11/17/23 1512    11/16/23 1436  melatonin tablet 6 mg         -- Oral Nightly PRN 11/16/23 1341          Pressors:    Autonomic Drugs (From admission, onward)      None          Hyperglycemia/Diabetes Medications:   Antihyperglycemics (From admission, onward)      Start     Stop Route Frequency Ordered    11/18/23 1224  insulin aspart U-100 pen 0-15 Units         -- SubQ Before meals & nightly PRN 11/18/23 1125    11/17/23 2100  insulin detemir U-100 (Levemir) pen 22 Units         -- SubQ Nightly 11/17/23 1213    11/17/23 1645  insulin aspart U-100 pen 14 Units         -- SubQ 3 times daily with meals 11/17/23 1513            ASSESSMENT and PLAN    Renal/  Hypercalcemia of " malignancy  Labs obtained in September show low PTH, Normal Vitamin D and undetectable PTHrp    He is receiving treatment for hypercalcemia of malignancy with Xgeva per oncology, so would defer any further management of hypercalcemia of malignancy to Oncology      Endocrine  Steroid-induced hyperglycemia  Key History and Diagnostic Findings  Hyperglycemia started with initiation of dexamethasone  Now on 2g BID dexamethasone  Does have an elevated hemoglobin A1c now although would question the accuracy given approximately two months of steroid induced hyperglycemia. A1c of 5.8 in 2020 indicates potentially prediabetic  Home regimen prior to admission: L10BID, N5WM and ISS  Weight based insulin requirements: 20 units long acting, 6-7U TIDWM    Plan  Neurosurgery started pulse dose steroids increasing Dexamethasone from 2g BID to 4g Q8. Anticipate increased blood glucose and insulin requirements. Steroid induced hyperglycemia benefits greater from mealtime insulin adjustment. Insulin adjustments as follows:  - Increased Novolog to 14 units with meals   - Continue levemir to 22 units nightly   - Increased accuchecks to ACHS + 2AM and increase to high dose sliding scale (from medium dose)        Postoperative hypothyroidism  [place] [facility]          Patients taking Sutent may have increasing levothyroxine requirements while on Sutent. He has taken this medicine in the past, although not clearly recently.  Also a question of compliance with medications at home given recent worsening of confusion and unclear if he is taking Levothyroxine appropriately.    - TFTs show subclinical hyperthyroidism, can reevaluate as an outpatient        Ricci Garcia DO  Endocrinology  Julio Thao - Neurosurgery ([facility])

## 2023-11-18 NOTE — PLAN OF CARE
Plan of Care Note:    Recommendations:  -Recommend one time dose of zometa 4mg one time dose prior to DC due to hypercalcemia of malignancy secondary to metastatic RCC  -Message send to Cancer scheduling for acute follow-up this week with labs for hypercalcemia and thrombocytopenia monitoring  -No barriers to DC from medical oncology perspective  -Case discussed with Dr. Santa Alvarez D.O.  Hematology/Oncology Fellow, PGY-V

## 2023-11-18 NOTE — SUBJECTIVE & OBJECTIVE
"No past medical history on file.    Past Surgical History:   Procedure Laterality Date    ENDOSCOPY OF PROXIMAL SMALL INTESTINE N/A 8/18/2022    Procedure: Upper DBE with general anesthesia;  Surgeon: Michael Silveira MD;  Location: [facility];  Service: Endoscopy;  Laterality: N/A;    RADICAL NEPHRECTOMY Right 08/18/2016    THYROIDECTOMY         Review of patient's allergies indicates:   Allergen Reactions    Iodinated contrast media Rash     Delayed non itching rash after CT scan.    Per Nsg Notes 4/30/19 - premed with Benadryl 25mg tab, and no reaction noted post CT.   8/6/19 did well with Benadryl 25 mg PO x1       Current Neurological Medications:   Dexamethasone    No current facility-administered medications on file prior to encounter.     Current Outpatient Medications on File Prior to Encounter   Medication Sig    [DISCONTINUED] BD DAY 2ND GEN PEN NEEDLE 32 gauge x 5/32" Ndle USE FOUR TIMES DAILY WITH INSULIN    [DISCONTINUED] benzonatate (TESSALON) 100 MG capsule Take 100 mg by mouth 3 (three) times daily.    [DISCONTINUED] CABOMETYX 40 mg Tab     [DISCONTINUED] cabozantinib (CABOMETYX) 60 mg Tab     [DISCONTINUED] dexamethasone (DECADRON ORAL)     [DISCONTINUED] dexAMETHasone (DECADRON) 4 MG Tab     [DISCONTINUED] diphenoxylate-atropine 2.5-0.025 mg (LOMOTIL) 2.5-0.025 mg per tablet Take 2 tablets by mouth every 6 (six) hours as needed.    [DISCONTINUED] furosemide (LASIX) 20 MG tablet Take 20 mg by mouth.    [DISCONTINUED] HYDROcodone-acetaminophen (NORCO) 5-325 mg per tablet Take 1 tablet by mouth every 6 (six) hours as needed.    [DISCONTINUED] hydroxychloroquine (PLAQUENIL) 200 mg tablet Take 200 mg by mouth 2 (two) times daily.    [DISCONTINUED] icosapent ethyL (VASCEPA) 1 gram Cap     [DISCONTINUED] LEVEMIR FLEXPEN 100 unit/mL (3 mL) InPn pen SMARTSIG:10 Unit(s) SUB-Q Twice Daily    [DISCONTINUED] loperamide (IMODIUM A-D) 2 mg Tab Take 2 mg by mouth.    [DISCONTINUED] NOVOLOG FLEXPEN U-100 INSULIN " 100 unit/mL (3 mL) InPn pen USE WITH SLIDING SCALE 4 TIMES DAILY    [DISCONTINUED] NOVOLOG MIX 70-30FLEXPEN U-100 100 unit/mL (70-30) InPn pen     [DISCONTINUED] pravastatin (PRAVACHOL) 20 MG tablet Take 20 mg by mouth every evening.    [DISCONTINUED] TRUE METRIX GLUCOSE METER Misc use as directed    [DISCONTINUED] TRUEPLUS LANCETS 33 gauge Misc Apply topically 2 (two) times daily.    ciprofloxacin HCl (CIPRO) 500 MG tablet Take 1 tablet (500 mg total) by mouth 2 (two) times daily. for 10 days    dexAMETHasone (DECADRON) 2 MG tablet Take 2 mg by mouth 2 times daily with meals.    esomeprazole (NEXIUM) 40 MG capsule Take 40 mg by mouth 2 (two) times daily.    famotidine-calcium carbonate-magnesium hydroxide (PEPCID COMPLETE) chewable tablet Take 1 tablet by mouth nightly as needed.    levothyroxine (SYNTHROID) 200 MCG tablet Take 200 mcg by mouth before breakfast.    ondansetron (ZOFRAN-ODT) 4 MG TbDL DISSOLVE 1 TABLET ON THE TONGUE EVERY 4 HOURS AS NEEDED FOR NAUSEA    [DISCONTINUED] apremilast (OTEZLA) 30 mg Tab Take 30 mg by mouth 2 (two) times daily.    [DISCONTINUED] apremilast (OTEZLA) 30 mg Tab Take 1 tablet by mouth 2 (two) times daily.    [DISCONTINUED] ascorbic acid, vitamin C, (VITAMIN C) 1000 MG tablet Take 1 tablet by mouth once daily.    [DISCONTINUED] blood sugar diagnostic Strp 1 strip by Misc.(Non-Drug; Combo Route) route 2 (two) times daily with meals.    [DISCONTINUED] blood-glucose meter kit Use as instructed    [DISCONTINUED] cholecalciferol, vitamin D3, (VITAMIN D3) 50 mcg (2,000 unit) Cap capsule Take 2,000 Units by mouth.    [DISCONTINUED] chondroitin sulfate A sodium 400 mg Cap Take 2 tablets by mouth once daily.    [DISCONTINUED] crisaborole (EUCRISA) 2 % Oint Apply 1 Application topically.    [DISCONTINUED] fexofenadine (ALLEGRA) 180 MG tablet Take 1 tablet by mouth every evening.    [DISCONTINUED] fluconazole (DIFLUCAN) 200 MG Tab Take 200 mg by mouth.    [DISCONTINUED] ibuprofen  "(ADVIL,MOTRIN) 200 MG tablet Take 200 mg by mouth.    [DISCONTINUED] insulin aspart U-100 (NOVOLOG U-100 INSULIN ASPART) 100 unit/mL injection Inject 5 Units into the skin 3 (three) times daily before meals.    [DISCONTINUED] insulin aspart U-100 (NOVOLOG U-100 INSULIN ASPART) 100 unit/mL injection Moderate dose: Novolog correction based on before meal glucose: 150-200: add 2 units 201-250: add 4 units 251-300: add 6 units 301-350: add 8 units >350: add 10 units    [DISCONTINUED] insulin detemir U-100 (LEVEMIR) 100 unit/mL injection Inject 10 Units into the skin 2 (two) times a day.    [DISCONTINUED] insulin syringe-needle U-100 0.3 mL 30 gauge x 5/16" Syrg 1 each by Misc.(Non-Drug; Combo Route) route 2 (two) times daily with meals.    [DISCONTINUED] irbesartan (AVAPRO) 150 MG tablet     [DISCONTINUED] lancets Misc 1 lancet  by Misc.(Non-Drug; Combo Route) route 2 (two) times daily with meals.    [DISCONTINUED] lancing device Misc 1 Device by Misc.(Non-Drug; Combo Route) route 2 (two) times daily with meals.    [DISCONTINUED] lenvatinib (LENVIMA) 14 mg/day(10 mg x 1-4 mg x 1) Cap Take by mouth.    [DISCONTINUED] lenvatinib (LENVIMA) 14 mg/day(10 mg x 1-4 mg x 1) Cap Take 14 mg by mouth daily as needed.    [DISCONTINUED] lenvatinib (LENVIMA) 14 mg/day(10 mg x 1-4 mg x 1) Cap Take 14 mg by mouth daily as needed.    [DISCONTINUED] metformin (GLUCOPHAGE) 500 MG tablet     [DISCONTINUED] multivitamin (THERAGRAN) per tablet Take 1 tablet by mouth.    [DISCONTINUED] multivitamin (THERAGRAN) per tablet Take 1 tablet by mouth once daily.    [DISCONTINUED] nivolumab 100 mg/10 mL injection Inject into the vein.    [DISCONTINUED] nivolumab 40 mg/4 mL injection Inject into the vein.    [DISCONTINUED] pembrolizumab (KEYTRUDA IV) Inject into the vein.    [DISCONTINUED] pen needle, diabetic 31 gauge x 5/16" Ndle 1 each by Misc.(Non-Drug; Combo Route) route 2 (two) times daily with meals.    [DISCONTINUED] pravastatin (PRAVACHOL) 40 " MG tablet Take 20 mg by mouth once daily. Taking 1/2 of 40 mg tablet daily      [DISCONTINUED] triamterene-hydrochlorothiazide 37.5-25 mg (MAXZIDE-25) 37.5-25 mg per tablet      Family History    None       Tobacco Use    Smoking status: Never    Smokeless tobacco: Not on file   Substance and Sexual Activity    Alcohol use: Not on file    Drug use: Not on file    Sexual activity: Not on file     Review of Systems   Constitutional:  Negative for activity change, chills and fever.   HENT:  Negative for hearing loss and sore throat.    Eyes:  Negative for pain and visual disturbance.   Respiratory:  Negative for cough and shortness of breath.    Cardiovascular:  Negative for chest pain and palpitations.   Gastrointestinal:  Negative for abdominal distention, abdominal pain, nausea and vomiting.   Genitourinary:  Negative for decreased urine volume, difficulty urinating and dysuria.   Musculoskeletal:  Negative for arthralgias and neck stiffness.   Skin:  Negative for color change and rash.   Neurological:  Positive for seizures. Negative for dizziness, weakness and headaches.   Psychiatric/Behavioral:  Negative for confusion.      Objective:     Vital Signs (Most Recent):  Temp: 98.1 °F (36.7 °C) (11/17/23 1925)  Pulse: 70 (11/17/23 1925)  Resp: 18 (11/17/23 1925)  BP: (!) 123/59 (11/17/23 1925)  SpO2: 95 % (11/17/23 1925) Vital Signs (24h Range):  Temp:  [97.2 °F (36.2 °C)-98.4 °F (36.9 °C)] 98.1 °F (36.7 °C)  Pulse:  [59-73] 70  Resp:  [16-20] 18  SpO2:  [94 %-96 %] 95 %  BP: (113-144)/(55-65) 123/59     Weight: 75.5 kg (166 lb 7.2 oz)  Body mass index is 27.7 kg/m².     Physical Exam  Vitals and nursing note reviewed.   Constitutional:       General: He is not in acute distress.     Appearance: Normal appearance. He is not toxic-appearing.   HENT:      Head: Normocephalic.      Nose: No congestion.      Mouth/Throat:      Mouth: Mucous membranes are moist.      Pharynx: No oropharyngeal exudate.   Eyes:       General: No scleral icterus.  Cardiovascular:      Rate and Rhythm: Normal rate.      Pulses: Normal pulses.   Pulmonary:      Effort: Pulmonary effort is normal. No respiratory distress.   Abdominal:      General: Abdomen is flat. There is no distension.      Palpations: Abdomen is soft.      Tenderness: There is no abdominal tenderness.   Skin:     General: Skin is warm and dry.      Coloration: Skin is not jaundiced.   Psychiatric:         Mood and Affect: Mood normal.       Neurological Exam:  MENTAL STATUS  Level of consciousness: alert  Orientation: oriented to person, place, month and day but not year  Attention normal. Concentration normal.  Speech: normal    CRANIAL NERVES  CN II: Visual fields full to confrontation  CN III, IV, VI: PERRL, EOMI  CN V: Facial sensation intact  CN VII: Facial expression symmetric and full  CN VIII: Hearing intact to finger rub  CN IX, X: Symmetric palate elevation. Phonation normal  CN XI: Shoulder shrug and head turn intact bilaterally  CN XII: Tongue midline with normal movements, no atrophy    MOTOR EXAM  Muscle bulk: normal  Muscle tone: normal  Pronator drift: absent    Strength - Upper Extremities   Arm abduction Elbow flexion Elbow extension Wrist flexion Wrist extension Finger abduction   Right 5/5 5/5 5/5 5/5 5/5 5/5   Left 5/5 5/5 5/5 5/5 5/5 5/5     Strength - Lower Extremities   Hip flexion Knee flexion Knee extension Dorsiflexion Plantarflexion   Right 2/5 4/5 4/5 4/5 3/5   Left 2/5 4/5 4/5 4/5 3/5       REFLEXES   Biceps Triceps Brachioradialis Patellar Achilles   Right +2 +2 +2 +1 +2   Left +2 +2 +2 +1 +2     Toes equivocal bilaterally    SENSORY EXAM  Light touch: intact in all 4 extremities  Temperature: Light touch: intact in all 4 extremities    COORDINATION  Finger to nose: normal             Significant Labs: All pertinent lab results from the past 24 hours have been reviewed.    Significant Imaging: I have reviewed all pertinent imaging results/findings  within the past 24 hours.

## 2023-11-18 NOTE — HPI
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 Diagnosed in 7/16, he underwent a left nephrectomy and was in remission until 8/20 when a pleural based nodule was identified. Progression was noted with a sacroiliac lytic lesion in 5/21, leading to hypercalcemia and GIB, necessitating chronic transfusions. Multiple intracerebral lesions were recently discovered, and he underwent gamma knife surgery for a frontal cerebral lesion. Dr. 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 ISIDRO Michael for cancer care and was previously treated at [facility] by Dr. Corado and Dr. Hinson.    Recently, he experienced increased confusion, leading to suspicion of a UTI, for which Ciprofloxacin was started empirically. He has a history of hypercalcemia of malignancy, initially treated with Xgeva, resulting in initial hypocalcemia, now with rising Ca 2+. Following Dexamethasone administration for intracerebral lesions, profound hyperglycemia (BG up to 800mg/dl) was noted, prompting a dose reduction.    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 The ED evaluation revealed new thrombocytopenia and a corrected calcium of 11.8. CT Head showed no new findings beyond previously identified lesions. He was treated with IV Ciprofloxacin and 1L LR and admitted.    During my visit, his daughter and wife provided most of the history. Dr. Coppola was alert and conversational with good awareness of his situation, but on rare occasion, presented inaccurate information. Dr. Morales, a family friend, has been involved in outpatient management.    Neuro exam notable for proximal bilateral weakness, but otherwise fairly unremarkable.

## 2023-11-18 NOTE — PROGRESS NOTES
Julio Thao - Neurosurgery ([facility])  Endocrinology  Progress Note    Admit Date: 11/16/2023     Mark Coppola is a 73 y.o. 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 Labs obtained two days ago were positive for a corrected calcium of 12.8. Patient is a poor historian owing to recent worsening of confusion potentially in the setting of UTI. I spoke with patients leonora Pitts at bedside.    Endocrinology is consulted to assist with management of hyperglycemia.    Patient was started on dexamethasone for progression of brain mets and he underwent gamma knife with Dr. Ragland on 11/10/23. Since starting steroids he has had significant hyperglycemia for which he was started on BID levemir and mealtime novolog per his primary card doctor. He has had persistently elevated blood glucose levels in the 300s at home and is wearing a DEXCOM now. Hong states that he is not sure regarding patients compliance with insulin injections at home. Patient does not think that he is taking any levemir.    Additional review of the chart shows hypothyroidism. Family at bedside report that this has not been addressed.            No new subjective & objective note has been filed under this hospital service since the last note was generated.      ASSESSMENT and PLAN    Renal/  Hypercalcemia of malignancy  Labs obtained in September show low PTH, Normal Vitamin D and undetectable PTHrp    He is receiving treatment for hypercalcemia of malignancy with Xgeva per oncology, so would defer any further management of hypercalcemia of malignancy to Oncology      Endocrine  Steroid-induced hyperglycemia  Key History and Diagnostic Findings  Hyperglycemia started with initiation of dexamethasone  Now on 2g BID  dexamethasone  Does have an elevated hemoglobin A1c now although would question the accuracy given approximately two months of steroid induced hyperglycemia. A1c of 5.8 in 2020 indicates potentially prediabetic  Home regimen prior to admission: L10BID, N5WM and ISS  Weight based insulin requirements: 20 units long acting, 6-7U TIDWM    Plan  Neurosurgery started pulse dose steroids increasing Dexamethasone from 2g BID to 4g Q8. Anticipate increased blood glucose and insulin requirements. Steroid induced hyperglycemia benefits greater from mealtime insulin adjustment. Insulin adjustments as follows:  - Increased Novolog to 14 units with meals   - Continue levemir to 22 units nightly   - Increased accuchecks to ACHS + 2AM and increase to high dose sliding scale (from medium dose)        Postoperative hypothyroidism  [place] [facility] [place]          Patients taking Sutent may have increasing levothyroxine requirements while on Sutent. He has taken this medicine in the past, although not clearly recently.  Also a question of compliance with medications at home given recent worsening of confusion and unclear if he is taking Levothyroxine appropriately.    - TFTs show subclinical hyperthyroidism, can reevaluate as an outpatient        Ricci Garcia DO  Endocrinology  Julio Thao - Neurosurgery ([facility])

## 2023-11-19 VITALS
OXYGEN SATURATION: 93 % | TEMPERATURE: 98 F | HEART RATE: 95 BPM | SYSTOLIC BLOOD PRESSURE: 132 MMHG | RESPIRATION RATE: 18 BRPM | WEIGHT: 166.44 LBS | DIASTOLIC BLOOD PRESSURE: 91 MMHG | HEIGHT: 65 IN | BODY MASS INDEX: 27.73 KG/M2

## 2023-11-19 PROBLEM — N30.00 ACUTE CYSTITIS WITHOUT HEMATURIA: Status: RESOLVED | Noted: 2023-11-16 | Resolved: 2023-11-19

## 2023-11-19 PROBLEM — E83.52 HYPERCALCEMIA OF MALIGNANCY: Status: RESOLVED | Noted: 2023-11-16 | Resolved: 2023-11-19

## 2023-11-19 LAB
ALBUMIN SERPL BCP-MCNC: 1.9 G/DL (ref 3.5–5.2)
ALP SERPL-CCNC: 151 U/L (ref 55–135)
ALT SERPL W/O P-5'-P-CCNC: 33 U/L (ref 10–44)
ANION GAP SERPL CALC-SCNC: 10 MMOL/L (ref 8–16)
AST SERPL-CCNC: 13 U/L (ref 10–40)
BASOPHILS # BLD AUTO: 0.01 K/UL (ref 0–0.2)
BASOPHILS NFR BLD: 0.1 % (ref 0–1.9)
BILIRUB SERPL-MCNC: 0.4 MG/DL (ref 0.1–1)
BUN SERPL-MCNC: 25 MG/DL (ref 8–23)
CALCIUM SERPL-MCNC: 9.4 MG/DL (ref 8.7–10.5)
CHLORIDE SERPL-SCNC: 104 MMOL/L (ref 95–110)
CO2 SERPL-SCNC: 23 MMOL/L (ref 23–29)
CREAT SERPL-MCNC: 0.8 MG/DL (ref 0.5–1.4)
DIFFERENTIAL METHOD: ABNORMAL
EOSINOPHIL # BLD AUTO: 0 K/UL (ref 0–0.5)
EOSINOPHIL NFR BLD: 0 % (ref 0–8)
ERYTHROCYTE [DISTWIDTH] IN BLOOD BY AUTOMATED COUNT: 18.3 % (ref 11.5–14.5)
EST. GFR  (NO RACE VARIABLE): >60 ML/MIN/1.73 M^2
GLUCOSE SERPL-MCNC: 203 MG/DL (ref 70–110)
HCT VFR BLD AUTO: 28.3 % (ref 40–54)
HGB BLD-MCNC: 7.5 G/DL (ref 14–18)
IMM GRANULOCYTES # BLD AUTO: 0.13 K/UL (ref 0–0.04)
IMM GRANULOCYTES NFR BLD AUTO: 1.3 % (ref 0–0.5)
LYMPHOCYTES # BLD AUTO: 0.3 K/UL (ref 1–4.8)
LYMPHOCYTES NFR BLD: 2.5 % (ref 18–48)
MCH RBC QN AUTO: 23.7 PG (ref 27–31)
MCHC RBC AUTO-ENTMCNC: 26.5 G/DL (ref 32–36)
MCV RBC AUTO: 90 FL (ref 82–98)
MONOCYTES # BLD AUTO: 0.4 K/UL (ref 0.3–1)
MONOCYTES NFR BLD: 3.7 % (ref 4–15)
NEUTROPHILS # BLD AUTO: 9.2 K/UL (ref 1.8–7.7)
NEUTROPHILS NFR BLD: 92.4 % (ref 38–73)
NRBC BLD-RTO: 0 /100 WBC
PLATELET # BLD AUTO: 79 K/UL (ref 150–450)
PMV BLD AUTO: 9.4 FL (ref 9.2–12.9)
POCT GLUCOSE: 152 MG/DL (ref 70–110)
POCT GLUCOSE: 234 MG/DL (ref 70–110)
POCT GLUCOSE: 247 MG/DL (ref 70–110)
POTASSIUM SERPL-SCNC: 4.1 MMOL/L (ref 3.5–5.1)
PROT SERPL-MCNC: 5.5 G/DL (ref 6–8.4)
RBC # BLD AUTO: 3.16 M/UL (ref 4.6–6.2)
SODIUM SERPL-SCNC: 137 MMOL/L (ref 136–145)
WBC # BLD AUTO: 9.92 K/UL (ref 3.9–12.7)

## 2023-11-19 PROCEDURE — 85025 COMPLETE CBC W/AUTO DIFF WBC: CPT | Performed by: INTERNAL MEDICINE

## 2023-11-19 PROCEDURE — 25000003 PHARM REV CODE 250: Performed by: HOSPITALIST

## 2023-11-19 PROCEDURE — 80053 COMPREHEN METABOLIC PANEL: CPT | Performed by: INTERNAL MEDICINE

## 2023-11-19 PROCEDURE — 99214 PR OFFICE/OUTPT VISIT, EST, LEVL IV, 30-39 MIN: ICD-10-PCS | Mod: ,,, | Performed by: INTERNAL MEDICINE

## 2023-11-19 PROCEDURE — 95813 EEG EXTND MNTR 61-119 MIN: CPT | Mod: 26,,, | Performed by: PSYCHIATRY & NEUROLOGY

## 2023-11-19 PROCEDURE — 96372 THER/PROPH/DIAG INJ SC/IM: CPT | Performed by: STUDENT IN AN ORGANIZED HEALTH CARE EDUCATION/TRAINING PROGRAM

## 2023-11-19 PROCEDURE — 99214 OFFICE O/P EST MOD 30 MIN: CPT | Mod: ,,, | Performed by: INTERNAL MEDICINE

## 2023-11-19 PROCEDURE — 25000003 PHARM REV CODE 250: Performed by: INTERNAL MEDICINE

## 2023-11-19 PROCEDURE — 36415 COLL VENOUS BLD VENIPUNCTURE: CPT | Performed by: INTERNAL MEDICINE

## 2023-11-19 PROCEDURE — 95813 PR EEG, EXTENDED, 61-119 MINS: ICD-10-PCS | Mod: 26,,, | Performed by: PSYCHIATRY & NEUROLOGY

## 2023-11-19 PROCEDURE — 63600175 PHARM REV CODE 636 W HCPCS: Performed by: INTERNAL MEDICINE

## 2023-11-19 PROCEDURE — G0378 HOSPITAL OBSERVATION PER HR: HCPCS

## 2023-11-19 PROCEDURE — 63600175 PHARM REV CODE 636 W HCPCS: Performed by: STUDENT IN AN ORGANIZED HEALTH CARE EDUCATION/TRAINING PROGRAM

## 2023-11-19 PROCEDURE — 95813 EEG EXTND MNTR 61-119 MIN: CPT

## 2023-11-19 RX ORDER — DEXAMETHASONE 4 MG/1
4 TABLET ORAL EVERY 8 HOURS
Qty: 30 TABLET | Refills: 0 | Status: SHIPPED | OUTPATIENT
Start: 2023-11-19 | End: 2023-11-29

## 2023-11-19 RX ORDER — TALC
6 POWDER (GRAM) TOPICAL NIGHTLY PRN
Qty: 30 TABLET | Refills: 3 | Status: SHIPPED | OUTPATIENT
Start: 2023-11-19

## 2023-11-19 RX ORDER — INSULIN ASPART 100 [IU]/ML
0-15 INJECTION, SOLUTION INTRAVENOUS; SUBCUTANEOUS
Qty: 10 ML | Refills: 3 | Status: SHIPPED | OUTPATIENT
Start: 2023-11-19 | End: 2024-11-18

## 2023-11-19 RX ADMIN — INSULIN ASPART 3 UNITS: 100 INJECTION, SOLUTION INTRAVENOUS; SUBCUTANEOUS at 08:11

## 2023-11-19 RX ADMIN — LEVOTHYROXINE SODIUM 200 MCG: 100 TABLET ORAL at 05:11

## 2023-11-19 RX ADMIN — INSULIN ASPART 6 UNITS: 100 INJECTION, SOLUTION INTRAVENOUS; SUBCUTANEOUS at 01:11

## 2023-11-19 RX ADMIN — DEXAMETHASONE 4 MG: 4 TABLET ORAL at 01:11

## 2023-11-19 RX ADMIN — HUMAN INSULIN 20 UNITS: 100 INJECTION, SUSPENSION SUBCUTANEOUS at 03:11

## 2023-11-19 RX ADMIN — CIPROFLOXACIN HYDROCHLORIDE 500 MG: 500 TABLET, FILM COATED ORAL at 08:11

## 2023-11-19 RX ADMIN — INSULIN ASPART 6 UNITS: 100 INJECTION, SOLUTION INTRAVENOUS; SUBCUTANEOUS at 02:11

## 2023-11-19 RX ADMIN — ACETAMINOPHEN 650 MG: 325 TABLET ORAL at 10:11

## 2023-11-19 RX ADMIN — SENNOSIDES AND DOCUSATE SODIUM 1 TABLET: 8.6; 5 TABLET ORAL at 08:11

## 2023-11-19 RX ADMIN — DEXAMETHASONE 4 MG: 4 TABLET ORAL at 05:11

## 2023-11-19 RX ADMIN — HUMAN INSULIN 20 UNITS: 100 INJECTION, SUSPENSION SUBCUTANEOUS at 05:11

## 2023-11-19 RX ADMIN — PANTOPRAZOLE SODIUM 40 MG: 20 TABLET, DELAYED RELEASE ORAL at 08:11

## 2023-11-19 NOTE — PLAN OF CARE
Julio Thao - Neurosurgery (Hospital)  Discharge Final Note    Primary Care Provider: Adele Morales MD    Expected Discharge Date: 11/19/2023    Patient to resume care with [facility]. No other post acute needs. Family will transport patient home.     Patient cleared to discharge from case management standpoint.    Final Discharge Note (most recent)       Final Note - 11/19/23 1520          Final Note    Assessment Type Final Discharge Note     Anticipated Discharge Disposition Home-Health Care [facility]     Hospital Resources/Appts/Education Provided Provided patient/caregiver with written discharge plan information;Appointments scheduled and added to AVS        Post-Acute Status    Post-Acute Authorization Home Health     Home Health Status Set-up Complete/Auth obtained     Coverage [employer] of [place] PPO     Discharge Delays None known at this time                   Important Message from Medicare         Future Appointments   Date Time Provider Department Center   11/22/2023  1:00 PM Marina Peralta, [facility] ENDOCRN [facility]   11/28/2023 12:45 PM Bartolo Ragland MD [facility] NEUROS [facility]   11/28/2023  3:00 PM Tata Michael MD [facility] [id number] [facility]   2/20/2024  7:15 AM [facility] OIC-MRI2 [facility]   2/20/2024 10:00 AM Donn Velazquez MD [facility] RADONC3 [facility]   2/20/2024 10:30 AM Bartolo Ragland MD [facility] NEUROS [facility]     Mckayla Granger RN  Weekend  - [facility] [place]  Spectralink: [phone]

## 2023-11-19 NOTE — SUBJECTIVE & OBJECTIVE
"Interval HPI:   Overnight events: Dose of scheduled NPH was increased last night from 15u q8h to 20u q8h. This AM BG 150s. Still getting Dexamethasone 4 mg q8h.   Eating:    no diet ordered  Nausea: No  Hypoglycemia and intervention: No  Fever: No  TPN and/or TF: No  If yes, type of TF/TPN and rate: n/a    BP (!) 123/59 (BP Location: Right arm, Patient Position: Lying)   Pulse 71   Temp 98.5 °F (36.9 °C) (Oral)   Resp 16   Ht 5' 5" (1.651 m)   Wt 75.5 kg (166 lb 7.2 oz)   SpO2 97%   BMI 27.70 kg/m²     Labs Reviewed and Include    Recent Labs   Lab 11/19/23  0344   *   CALCIUM 9.4   ALBUMIN 1.9*   PROT 5.5*      K 4.1   CO2 23      BUN 25*   CREATININE 0.8   ALKPHOS 151*   ALT 33   AST 13   BILITOT 0.4     Lab Results   Component Value Date    WBC 9.92 11/19/2023    HGB 7.5 (L) 11/19/2023    HCT 28.3 (L) 11/19/2023    MCV 90 11/19/2023    PLT 79 (L) 11/19/2023     Recent Labs   Lab 11/17/23  0338   TSH 0.013*   FREET4 1.03     Lab Results   Component Value Date    HGBA1C 9.5 (H) 11/16/2023       Nutritional status:   Body mass index is 27.7 kg/m².  Lab Results   Component Value Date    ALBUMIN 1.9 (L) 11/19/2023    ALBUMIN 1.8 (L) 11/18/2023    ALBUMIN 1.9 (L) 11/17/2023     No results found for: "PREALBUMIN"    Estimated Creatinine Clearance: 75.7 mL/min (based on SCr of 0.8 mg/dL).    Accu-Checks  Recent Labs     11/17/23  0807 11/17/23  1217 11/17/23  1740 11/17/23  2145 11/18/23  0738 11/18/23  1220 11/18/23  1647 11/18/23  2101 11/19/23  0210 11/19/23  0544   POCTGLUCOSE 294* 206* 299* 319* 270* 284* 366* 366* 234* 152*       Current Medications and/or Treatments Impacting Glycemic Control  Immunotherapy:    Immunosuppressants       None          Steroids:   Hormones (From admission, onward)      Start     Stop Route Frequency Ordered    11/17/23 2200  dexAMETHasone tablet 4 mg         -- Oral Every 8 hours 11/17/23 1512    11/16/23 1436  melatonin tablet 6 mg         -- Oral Nightly " PRN 11/16/23 1341          Pressors:    Autonomic Drugs (From admission, onward)      None          Hyperglycemia/Diabetes Medications:   Antihyperglycemics (From admission, onward)      Start     Stop Route Frequency Ordered    11/19/23 0600  insulin NPH injection 20 Units         -- SubQ Every 8 hours 11/18/23 2147 11/19/23 0200  insulin aspart U-100 pen 0-15 Units         -- SubQ Before meals, at bedtime and at 0200 11/18/23 2140

## 2023-11-19 NOTE — ASSESSMENT & PLAN NOTE
A/ 2016 diagnosed.   Follows with Dr. Corado at [facility],  Recently 2nd opinion Dr. Michael at [facility].   S/p left nephrectomy  Remission until 8/20 - pleural based nodule, bony lesions, now intracranial lesions  recent Gamma Knife for frontal brain mass - Dr. Velazquez/ Dr. Ragland ( 11/23)  Has had progression on Keytruda it appears.   Unclear what options remain given concerns of performance status, GIB    P/   Oncology follow up

## 2023-11-19 NOTE — ASSESSMENT & PLAN NOTE
metastatic RCC s/p nephrectomy with sacroiliac and brain mets presenting with increased confusion last two weeks. Patient recently had gamma knife surgery with Dr. Ragland 11/10/23.   Was on dex outpatient, weaned down due to hyperglycemia, now it has harish restarted  Manage BG as above  Management of metastatic cancer per primary

## 2023-11-19 NOTE — SUBJECTIVE & OBJECTIVE
Subjective:     Interval History: Exam unchanged; considering addition of an antiepileptic    Current Neurological Medications:   Dexamethasone    Current Facility-Administered Medications   Medication Dose Route Frequency Provider Last Rate Last Admin    acetaminophen tablet 650 mg  650 mg Oral Q8H PRN Jose Maria Taylor MD        ciprofloxacin HCl tablet 500 mg  500 mg Oral BID Jose Maria Taylor MD   500 mg at 11/18/23 0838    dexAMETHasone tablet 4 mg  4 mg Oral Q8H Jose Maria Taylor MD   4 mg at 11/18/23 1324    dextrose 10% bolus 125 mL 125 mL  12.5 g Intravenous PRN Jose Maria Taylor MD        dextrose 10% bolus 125 mL 125 mL  12.5 g Intravenous PRN Ricci Garcia, DO        dextrose 10% bolus 250 mL 250 mL  25 g Intravenous PRN Jose Maria Taylor MD        dextrose 10% bolus 250 mL 250 mL  25 g Intravenous PRN Itersky, Ricci, DO        glucagon (human recombinant) injection 1 mg  1 mg Intramuscular PRN Itersky, Ricci, DO        glucose chewable tablet 16 g  16 g Oral PRN Itersky Ricci, DO        glucose chewable tablet 24 g  24 g Oral PRN Itersky, Ricci, DO        HYDROcodone-acetaminophen 5-325 mg per tablet 1 tablet  1 tablet Oral Q4H PRN Jose Maria Taylor MD   1 tablet at 11/16/23 1837    insulin aspart U-100 pen 0-15 Units  0-15 Units Subcutaneous QID (AC + HS) PRN Itersky, Ricci, DO   15 Units at 11/18/23 1710    insulin NPH injection 15 Units  15 Units Subcutaneous Q8H Itersky Ricci, DO   15 Units at 11/18/23 1521    levothyroxine tablet 200 mcg  0.2 mg Oral Before breakfast Jose Maria Taylor MD   200 mcg at 11/18/23 0527    melatonin tablet 6 mg  6 mg Oral Nightly PRN Jose Maria Taylor MD        pantoprazole EC tablet 40 mg  40 mg Oral BID AC Adele Morales MD   40 mg at 11/18/23 1709    senna-docusate 8.6-50 mg per tablet 1 tablet  1 tablet Oral BID Jose Maria Taylor MD   1 tablet at 11/18/23 0837    sodium chloride 0.9% flush 10 mL  10 mL Intravenous PRN Jose Maria Taylor MD            Review of Systems   Constitutional:  Negative for activity change, chills and fever.   HENT:  Negative for hearing loss and sore throat.    Eyes:  Negative for pain and visual disturbance.   Respiratory:  Negative for cough and shortness of breath.    Cardiovascular:  Negative for chest pain and palpitations.   Gastrointestinal:  Negative for abdominal distention, abdominal pain, nausea and vomiting.   Genitourinary:  Negative for decreased urine volume, difficulty urinating and dysuria.   Musculoskeletal:  Negative for arthralgias and neck stiffness.   Skin:  Negative for color change and rash.   Neurological:  Positive for seizures. Negative for dizziness, weakness and headaches.   Psychiatric/Behavioral:  Negative for confusion.      Objective:     Vital Signs (Most Recent):  Temp: 97.8 °F (36.6 °C) (11/18/23 1905)  Pulse: 68 (11/18/23 1905)  Resp: 18 (11/18/23 1905)  BP: (!) 122/58 (11/18/23 1905)  SpO2: 95 % (11/18/23 1905) Vital Signs (24h Range):  Temp:  [97.5 °F (36.4 °C)-97.9 °F (36.6 °C)] 97.8 °F (36.6 °C)  Pulse:  [66-73] 68  Resp:  [18-24] 18  SpO2:  [90 %-98 %] 95 %  BP: (122-138)/(58-64) 122/58     Weight: 75.5 kg (166 lb 7.2 oz)  Body mass index is 27.7 kg/m².     Physical Exam  Vitals and nursing note reviewed.   Constitutional:       General: He is not in acute distress.     Appearance: Normal appearance. He is not toxic-appearing.   HENT:      Head: Normocephalic.      Nose: No congestion.      Mouth/Throat:      Mouth: Mucous membranes are moist.      Pharynx: No oropharyngeal exudate.   Eyes:      General: No scleral icterus.  Cardiovascular:      Rate and Rhythm: Normal rate.      Pulses: Normal pulses.   Pulmonary:      Effort: Pulmonary effort is normal. No respiratory distress.   Abdominal:      General: Abdomen is flat. There is no distension.      Palpations: Abdomen is soft.      Tenderness: There is no abdominal tenderness.   Skin:     General: Skin is warm and dry.      Coloration:  Skin is not jaundiced.   Psychiatric:         Mood and Affect: Mood normal.       Neurological Exam:  MENTAL STATUS  Level of consciousness: alert  Orientation: oriented to person, place, month and day but not year  Attention normal. Concentration normal.  Speech: normal    CRANIAL NERVES  CN II: Visual fields full to confrontation  CN III, IV, VI: PERRL, EOMI  CN V: Facial sensation intact  CN VII: Facial expression symmetric and full  CN VIII: Hearing intact to finger rub  CN IX, X: Symmetric palate elevation. Phonation normal  CN XI: Shoulder shrug and head turn intact bilaterally  CN XII: Tongue midline with normal movements, no atrophy    MOTOR EXAM  Muscle bulk: normal  Muscle tone: normal  Pronator drift: absent    Strength - Upper Extremities   Arm abduction Elbow flexion Elbow extension Wrist flexion Wrist extension Finger abduction   Right 5/5 5/5 5/5 5/5 5/5 5/5   Left 5/5 5/5 5/5 5/5 5/5 5/5     Strength - Lower Extremities   Hip flexion Knee flexion Knee extension Dorsiflexion Plantarflexion   Right 2/5 4/5 4/5 4/5 3/5   Left 2/5 4/5 4/5 4/5 3/5       REFLEXES   Biceps Triceps Brachioradialis Patellar Achilles   Right +2 +2 +2 +1 +2   Left +2 +2 +2 +1 +2     Toes equivocal bilaterally    SENSORY EXAM  Light touch: intact in all 4 extremities  Temperature: Light touch: intact in all 4 extremities    COORDINATION  Finger to nose: normal             Significant Labs: All pertinent lab results from the past 24 hours have been reviewed.    Significant Imaging: I have reviewed all pertinent imaging results/findings within the past 24 hours.

## 2023-11-19 NOTE — ASSESSMENT & PLAN NOTE
A/  Platelets continue to drift down  Plts 298 (11/14) > 98 > 77 > 70 (11/18)  Haptoglobin 317 (11/18). retic 2.8. INR 1.1, fibrinogen 541  No apparent cause    P/  Hematology follow up

## 2023-11-19 NOTE — ASSESSMENT & PLAN NOTE
73M PMH metastatic RCC s/p nephrectomy with multiple opulm, sacroiliac, brain mets presenting with increased confusion last two weeks. Patient recently had gamma knife surgery with Dr. Ragland 11/10/23. Was on Dex 4mg BID last month when glucose spiked into 800s, tapered down subsequently. Back on Dex 4q8 right now with 15u NPH Q8h and SSI.     Imaging:  MRI brain 11/18: scattered mets, no new lesions since 10/31    Plan:  --all labs and significant diagnostics reviewed  --admitted HM; q4h nc/vs  --recommend dex 4q6 for now with close glucose monitoring  --Will discuss maintaining dex 4q6 upon discharge vs taper to smaller dose

## 2023-11-19 NOTE — ASSESSMENT & PLAN NOTE
Hyperglycemia started with initiation of dexamethasone  Current steroids: Dexamethasone 4mg q8h  Does have an elevated hemoglobin A1c now although would question the accuracy given approximately two months of steroid induced hyperglycemia. A1c of 5.8 in 2020 indicates potentially prediabetic  Home regimen prior to admission: Levemir 10u BID, Novolog 5u with meals and low dose correction scale     Endocrinology consulted for BG management.   BG goal 140-180    Given steroid-induced hyperglycemia, MDI regimen was switched on 11/18 to scheduled NPH to be given with dexamethasone doses and aspart correction scale.   - NPH dose was increased from 15u q8h --> 20u qh8 11/18 PM, therefore we have not yet seen the effect of increased dose. No changes for now.    - NPH 20 units q8h given with dexamethasone doses. Please notify our team of plan for steroids. If adjusting dose at all, insulin regimen will need to be adjusted as well.  - Continue Novolog high dose correction scale prn   - Accuchecks ac/hs/0200 if diet is added; q4h if NPO or sugar-free CLD. Diet per primary.  - Hypoglycemia protocol in place      ** Please notify Endocrine for any change and/or advance in diet**  ** Please call Endocrine for any BG related issues **    Discharge Planning:   TBD. Please notify endocrinology prior to discharge.

## 2023-11-19 NOTE — PLAN OF CARE
[facility] ([facility])      HOME HEALTH ORDERS  FACE TO FACE ENCOUNTER    Patient Name: Mark Coppola  YOB: 1950    PCP: Adele Morales MD   PCP Address: [address] / [place] [address]  PCP Phone Number: [phone]  PCP Fax: [phone]    Encounter Date: 11/16/23    Admit to Home Health    Diagnoses:  Active Hospital Problems    Diagnosis  POA    *Encephalopathy, metabolic [G93.41]  Yes     Priority: 1 - High    Thrombocytopenia [D69.6]  Yes     Priority: 4     Metastatic renal cell carcinoma [C64.9]  Yes     Priority: 4     Type 2 diabetes mellitus without complication [E11.9]  Yes     Priority: 5     Steroid-induced hyperglycemia [R73.9, T38.0X5A]  Yes     Priority: 6     Postoperative hypothyroidism [E89.0]  Yes     Priority: 9     Mild malnutrition [E44.1]  Yes    Anemia [D64.9]  Yes      Resolved Hospital Problems    Diagnosis Date Resolved POA    Hypercalcemia of malignancy [E83.52] 11/19/2023 Yes     Priority: 3     Essential hypertension [I10] 11/19/2023 Yes     Priority: 6     Acute cystitis without hematuria [N30.00] 11/19/2023 Yes     Priority: 7        Follow Up Appointments:  Future Appointments   Date Time Provider Department Center   11/22/2023  1:00 PM Marina Peralta NP [facility] Cli   11/28/2023 12:45 PM Bartolo Ragland MD [facility] NEUROS [facility]   11/28/2023  3:00 PM Tata Michael MD [facility] HEMONC2 [facility]   2/20/2024  7:15 AM [facility] OIC-MRI2 [facility]   2/20/2024 10:00 AM Donn Velazquez MD [facility] RADONC3 [facility]   2/20/2024 10:30 AM Bartolo Ragland MD [facility]       Allergies:  Review of patient's allergies indicates:   Allergen Reactions    Iodinated contrast media Rash     Delayed non itching rash after CT scan.    Per Nsg Notes 4/30/19 - premed with Benadryl 25mg tab, and no reaction noted post CT.   8/6/19 did well with Benadryl 25 mg PO x1       Medications: Review discharge medications  with patient and family and provide education.    Current Facility-Administered Medications   Medication Dose Route Frequency Provider Last Rate Last Admin    acetaminophen tablet 650 mg  650 mg Oral Q8H PRJose Maria Plata MD   650 mg at 11/19/23 1003    ciprofloxacin HCl tablet 500 mg  500 mg Oral BID Jose Maria Taylor MD   500 mg at 11/19/23 0847    dexAMETHasone tablet 4 mg  4 mg Oral Q8H Jose Maria Taylor MD   4 mg at 11/19/23 1318    dextrose 10% bolus 125 mL 125 mL  12.5 g Intravenous PRN Jose Maria Taylor MD        dextrose 10% bolus 125 mL 125 mL  12.5 g Intravenous PRN Ricci Garcia,         dextrose 10% bolus 250 mL 250 mL  25 g Intravenous PRN Jose Maria Taylor MD        dextrose 10% bolus 250 mL 250 mL  25 g Intravenous PRN Ricci Garcia,         HYDROcodone-acetaminophen 5-325 mg per tablet 1 tablet  1 tablet Oral Q4H PRN Jose Maria Taylor MD   1 tablet at 11/16/23 1837    insulin aspart U-100 pen 0-15 Units  0-15 Units Subcutaneous AC + HS + 0200 Ricci Garcia DO   6 Units at 11/19/23 1318    insulin NPH injection 20 Units  20 Units Subcutaneous Q8H Ricci Garcia DO   20 Units at 11/19/23 0558    levothyroxine tablet 200 mcg  0.2 mg Oral Before breakfast Jose Maria Taylor MD   200 mcg at 11/19/23 0546    melatonin tablet 6 mg  6 mg Oral Nightly PRN Jose Maria Taylor MD        pantoprazole EC tablet 40 mg  40 mg Oral BID AC Adele Morales MD   40 mg at 11/19/23 0847    senna-docusate 8.6-50 mg per tablet 1 tablet  1 tablet Oral BID Jose Maria Taylor MD   1 tablet at 11/19/23 0847    sodium chloride 0.9% flush 10 mL  10 mL Intravenous PRN Jose Maria Taylor MD         Current Discharge Medication List        START taking these medications    Details   insulin aspart U-100 (NOVOLOG U-100 INSULIN ASPART) 100 unit/mL injection Inject 0-15 Units into the skin as needed for High Blood Sugar per sliding scale  Qty: 10 mL, Refills: 3    Comments: 1 Vial is only good for 28 days       insulin  unit/mL injection Inject 20 Units into the skin every 8 (eight) hours.  Qty: 10 mL, Refills: 3      melatonin (MELATIN) 3 mg tablet Take 2 tablets (6 mg total) by mouth nightly as needed for Insomnia.  Qty: 30 tablet, Refills: 3           CONTINUE these medications which have CHANGED    Details   dexAMETHasone (DECADRON) 4 MG Tab Take 1 tablet (4 mg total) by mouth every 8 (eight) hours. for 10 days  Qty: 30 tablet, Refills: 0           CONTINUE these medications which have NOT CHANGED    Details   esomeprazole (NEXIUM) 40 MG capsule Take 40 mg by mouth 2 (two) times daily.      famotidine-calcium carbonate-magnesium hydroxide (PEPCID COMPLETE) chewable tablet Take 1 tablet by mouth nightly as needed.      levothyroxine (SYNTHROID) 200 MCG tablet Take 200 mcg by mouth before breakfast.  Refills: 0      ondansetron (ZOFRAN-ODT) 4 MG TbDL DISSOLVE 1 TABLET ON THE TONGUE EVERY 4 HOURS AS NEEDED FOR NAUSEA           STOP taking these medications       ciprofloxacin HCl (CIPRO) 500 MG tablet Comments:   Reason for Stopping:                 I have seen and examined this patient within the last 30 days. My clinical findings that support the need for the home health skilled services and home bound status are the following:no   Mental confusion making it unsafe for patient to leave home alone due to  brain metastasis.     Diet:   diabetic diet 1800 calorie    Activities:   activity as tolerated    Nursing:   Agency to admit patient within 24 hours of hospital discharge unless specified on physician order or at patient request    SN to complete comprehensive assessment including routine vital signs. Instruct on disease process and s/s of complications to report to MD. Review/verify medication list sent home with the patient at time of discharge  and instruct patient/caregiver as needed. Frequency may be adjusted depending on start of care date.     Skilled nurse to perform up to 3 visits PRN for symptoms  related to diagnosis    Notify MD if SBP > 160 or < 90; DBP > 90 or < 50; HR > 120 or < 50; Temp > 101; O2 < 88%;     Ok to schedule additional visits based on staff availability and patient request on consecutive days within the home health episode.    When multiple disciplines ordered:    Start of Care occurs on Sunday - Wednesday schedule remaining discipline evaluations as ordered on separate consecutive days following the start of care.    Thursday SOC -schedule subsequent evaluations Friday and Monday the following week.     Friday - Saturday SOC - schedule subsequent discipline evaluations on consecutive days starting Monday of the following week.    For all post-discharge communication and subsequent orders please contact patient's primary care physician. If unable to reach primary care physician or do not receive response within 30 minutes, please contact  Discharging physician Dr Ness () for clinical staff order clarification    I certify that this patient is confined to his home and needs .

## 2023-11-19 NOTE — DISCHARGE SUMMARY
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 He was diagnosed in about 7/16, underwent left nephrectomy and was in remission until about 8/20 when a pleural based nodule appeared. Progression to sacroiliac lytic lesion about 5/21, developed hypoercalcemia and GIB, now on chronic transfusion needs. Recently found to have multiple scattered intracerebral lesions. Started on Dexamethasone and recently underwent gamma knife surgery of a frontal cerebral lesion (Dr. Ragland / Dr. Velazquez).     He is following with Dr. ISIDRO Michael at [facility] as a 2nd opinion for further cancer directed care. He has had most of his care at [facility] (Dr. Corado, Dr. Hinson) until now.     Most recently he was noted to have increased confusion. A UTI was suspected and Ciprofloxacin started empirically. He has known Hypercalcemia of malignancy and had received Xgeva (Denosumab) at [facility] with initial hypocalcemia, now rising Ca 2+ again.     Additionally, profound hyperglycemia had been noted since initiation of Dexamethasone with BG as high as 800mg/dl prompting dose reduction.     The pt. Has been reluctant to present to the ED for confusion but his wife had insisted and he agree to present today.     Evaluation in the ED shows new thrombocytopenia (98 from 298), [place]. Calcium of 11.8. CT Head with previously identified lesions but no new findings. Given a dose of IV Ciprofloxacin  and 1L LR and admitted.     On my visit, the pt. Daughter and wife give most of the history. The pt. Is alert and awake and conversational but has tangential thoughts and goes off topic frequently.     Case discussed with Dr. Morales, family friend, who has helped manage Dr. Coppola's condition outpatient.     Hospital Course     Patient w metastatic RCC folowed by Dr Michael admitted 11/16 for AMS likley mulitfactorial with hypercalcemia (Ca 10.7 corrected 12.5), hyperglycemia 2/2 steroid administration and recent gamma knife for brain metastasis on steroids. Ca improved on IVF . Zometa (4 mg) given as well.. Glucose improved with increase in insulin. Platelets continued to drift down. Mental status improved initially but mild confusion and tangential thinking persisted. MRI brain unchanged from prior. Dexamethasone increased from 2 mg bid to 4 mg tid per request of Neurosurgery with taper anticipated when the patient is seen in follow-up on 11/28.. EEG neg for sz focus. Patient was normotensive during admission on no BP medication.  Patient treated for E coli urinary tract infection with ciprofloxacin x5 days. Endocrinology, Oncology, Hematology, and Neurosurgery consultation services provided guidance during this admission. Close follow up with these services planned. Detailed instructions as to medication and follow up plan provided by me to patient and his wife at the bedside. All questions were  answered    Goals of Care Treatment Preferences:  Code Status: Full Code      Consults:   Consults (From admission, onward)          Status Ordering Provider     Inpatient consult to Neurology  Once        Provider:  (Not yet assigned)    Completed LEOPOLDO ESPINOSA     Inpatient consult to Hematology/Oncology  Once        Provider:  (Not yet assigned)    Completed LEOPOLDO ESPINOSA     Inpatient consult to Registered Dietitian/Nutritionist  Once        Provider:  (Not yet assigned)    Completed LEOPOLDO ESPINOSA             Final Active Diagnoses:    Diagnosis Date Noted POA    PRINCIPAL PROBLEM:  Encephalopathy, metabolic [G93.41] 11/16/2023 Yes    Thrombocytopenia [D69.6] 11/16/2023 Yes    Metastatic renal cell carcinoma [C64.9] 10/13/2023 Yes    Type 2 diabetes mellitus without complication [E11.9] 06/16/2016 Yes    Steroid-induced hyperglycemia [R73.9, T38.0X5A] 11/16/2023 Yes    Postoperative hypothyroidism [E89.0] 06/16/2016 Yes    Mild malnutrition [E44.1] 11/17/2023 Yes    Anemia [D64.9] 06/16/2016 Yes      Problems Resolved During this Admission:    Diagnosis Date Noted Date Resolved POA    Hypercalcemia of malignancy [E83.52] 11/16/2023 11/19/2023 Yes    Essential hypertension [I10] 06/16/2016 11/19/2023 Yes    Acute cystitis without hematuria [N30.00] 11/16/2023 11/19/2023 Yes       Discharged Condition: fair    Disposition: Home or Self Care with Home Health ([facility])     Follow Up:    Patient Instructions:      Diet diabetic     Diet diabetic     Notify your health care provider if you experience any of the following:  persistent dizziness, light-headedness, or visual disturbances     Notify your health care provider if you experience any of the following:  severe persistent headache     Notify your health care provider if you experience any of the following:  increased confusion or weakness     Activity as tolerated      Medications:  Reconciled Home Medications:      Medication List        START taking these medications      melatonin 3 mg tablet  Commonly known as: MELATIN  Take 2 tablets (6 mg total) by mouth nightly as needed for Insomnia.     NovoLIN N NPH U-100 Insulin 100 unit/mL injection  Generic drug: insulin NPH  Inject 20 Units into the skin every 8 (eight) hours.     NovoLOG U-100 Insulin aspart 100 unit/mL injection  Generic drug: insulin aspart U-100  Inject 0-15 Units into the skin as needed for High Blood Sugar per sliding scale            CHANGE how you take these medications      dexAMETHasone 4  MG Tab  Commonly known as: DECADRON  Take 1 tablet (4 mg total) by mouth every 8 (eight) hours. for 10 days  What changed:   medication strength  how much to take  when to take this            CONTINUE taking these medications      esomeprazole 40 MG capsule  Commonly known as: NEXIUM  Take 40 mg by mouth 2 (two) times daily.     famotidine-calcium carbonate-magnesium hydroxide -165 mg  Take 1 tablet by mouth nightly as needed.     levothyroxine 200 MCG tablet  Commonly known as: SYNTHROID  Take 200 mcg by mouth before breakfast.     ondansetron 4 MG Tbdl  Commonly known as: ZOFRAN-ODT  DISSOLVE 1 TABLET ON THE TONGUE EVERY 4 HOURS AS NEEDED FOR NAUSEA            STOP taking these medications      ciprofloxacin HCl 500 MG tablet  Commonly known as: CIPRO              Time spent on the discharge of patient: 59 minutes      Adam Ness MD  Department of Hospital Medicine  [facility] Neurosurgery ([facility]

## 2023-11-19 NOTE — SUBJECTIVE & OBJECTIVE
Medications Prior to Admission   Medication Sig Dispense Refill Last Dose    ciprofloxacin HCl (CIPRO) 500 MG tablet Take 1 tablet (500 mg total) by mouth 2 (two) times daily. for 10 days 20 tablet 0 Unknown    dexAMETHasone (DECADRON) 2 MG tablet Take 2 mg by mouth 2 times daily with meals.   Unknown    esomeprazole (NEXIUM) 40 MG capsule Take 40 mg by mouth 2 (two) times daily.   Unknown    famotidine-calcium carbonate-magnesium hydroxide (PEPCID COMPLETE) chewable tablet Take 1 tablet by mouth nightly as needed.   Unknown    levothyroxine (SYNTHROID) 200 MCG tablet Take 200 mcg by mouth before breakfast.  0 Unknown    ondansetron (ZOFRAN-ODT) 4 MG TbDL DISSOLVE 1 TABLET ON THE TONGUE EVERY 4 HOURS AS NEEDED FOR NAUSEA   Unknown       Review of patient's allergies indicates:   Allergen Reactions    Iodinated contrast media Rash     Delayed non itching rash after CT scan.    Per Nsg Notes 4/30/19 - premed with Benadryl 25mg tab, and no reaction noted post CT.   8/6/19 did well with Benadryl 25 mg PO x1       No past medical history on file.  Past Surgical History:   Procedure Laterality Date    ENDOSCOPY OF PROXIMAL SMALL INTESTINE N/A 8/18/2022    Procedure: Upper DBE with general anesthesia;  Surgeon: Michael Silveira MD;  Location: [facility];  Service: Endoscopy;  Laterality: N/A;    RADICAL NEPHRECTOMY Right 08/18/2016    THYROIDECTOMY       Family History    None       Tobacco Use    Smoking status: Never    Smokeless tobacco: Not on file   Substance and Sexual Activity    Alcohol use: Not on file    Drug use: Not on file    Sexual activity: Not on file     Review of Systems   Constitutional:  Negative for activity change, chills and fever.   HENT:  Negative for hearing loss and trouble swallowing.    Eyes:  Negative for visual disturbance.   Respiratory:  Negative for chest tightness.    Gastrointestinal:  Negative for abdominal distention.   Endocrine: Negative for cold intolerance and heat intolerance.    Genitourinary:  Negative for difficulty urinating.   Musculoskeletal:  Negative for arthralgias.   Allergic/Immunologic: Negative for immunocompromised state.   Neurological:  Negative for weakness, numbness and headaches.   Psychiatric/Behavioral:  Positive for confusion.      Objective:     Weight: 75.5 kg (166 lb 7.2 oz)  Body mass index is 27.7 kg/m².  Vital Signs (Most Recent):  Temp: 97.8 °F (36.6 °C) (11/18/23 1905)  Pulse: 68 (11/18/23 1905)  Resp: 18 (11/18/23 1905)  BP: (!) 122/58 (11/18/23 1905)  SpO2: 95 % (11/18/23 1905) Vital Signs (24h Range):  Temp:  [97.5 °F (36.4 °C)-97.9 °F (36.6 °C)] 97.8 °F (36.6 °C)  Pulse:  [66-73] 68  Resp:  [18-24] 18  SpO2:  [90 %-98 %] 95 %  BP: (122-138)/(58-64) 122/58                                 Physical Exam  Vitals and nursing note reviewed.   HENT:      Head: Normocephalic.   Eyes:      Extraocular Movements: Extraocular movements intact.   Cardiovascular:      Rate and Rhythm: Normal rate.   Pulmonary:      Effort: Pulmonary effort is normal.   Abdominal:      Palpations: Abdomen is soft.   Neurological:      Mental Status: He is alert.            E4V4M6  Aox1  Higher order confusion  Cni, PERRL  FC x4 AG  SILT        Significant Labs:  Recent Labs   Lab 11/17/23  0338 11/18/23  0301   * 225*   * 133*   K 4.4 4.3    103   CO2 22* 23   BUN 39* 30*   CREATININE 1.0 0.8   CALCIUM 9.8 9.6     Recent Labs   Lab 11/17/23  0338 11/18/23  0301   WBC 9.28 9.77   HGB 7.9* 7.5*   HCT 28.6* 27.7*   PLT 77* 70*     Recent Labs   Lab 11/18/23  0301   INR 1.1     Microbiology Results (last 7 days)       ** No results found for the last 168 hours. **          All pertinent labs from the last 24 hours have been reviewed.    Significant Diagnostics:  I have reviewed all pertinent imaging results/findings within the past 24 hours.  I have reviewed and interpreted all pertinent imaging results/findings within the past 24 hours.  MRI Brain W WO Contrast    Result  Date: 11/18/2023  Scattered enhancing lesions brain parenchyma remain concerning for sequela metastatic disease please note foci in the cerebellum extends to the folia as well as probable dural based lesion along the right frontal lobe.  There is no new lesion or significant increased size lesion Ventricles relatively similar without hydrocephalus Continued ill-defined enhancement right frontal periventricular white matter Clinical correlation and continued follow-up advised.  No definite new lesion or is increased size lesion to suggest significant disease progression. Clinical correlation and continued follow-up advised. Electronically signed by: Chang Lopez DO Date:    11/18/2023 Time:    16:24

## 2023-11-19 NOTE — PLAN OF CARE
Problem: Adult Inpatient Plan of Care  Goal: Readiness for Transition of Care  Outcome: Ongoing, Progressing     Problem: Adult Inpatient Plan of Care  Goal: Optimal Comfort and Wellbeing  Outcome: Ongoing, Progressing    POC reviewed with the patient and they verbalized understanding.[email or web address].family at bedside .  All comments and concerns addressed. Bed locked in lowest position with bed alarm set, call light within reach. Safety precautions maintained. VSS, see flowsheets. No events this shift. Will continue to monitor for changes to POC and clinical condition.

## 2023-11-19 NOTE — PROCEDURES
Date of service  11/19/2023    Introduction  Electroencephalographic (EEG) recording is performed with electrodes placed according to the International 10-20 placement system. Thirty two (32) channels of digital signal (sampling rate of 512/sec) including T1 and T2 was simultaneously recorded from the scalp and may include EKG, EMG, and/or eye monitors. Recording band pass was 0.1 to 512 Hz. Digital video recording of the patient is simultaneously recorded with the EEG. The patient is instructed to report clinical symptoms which may occur during the recording session. EEG and video recording is stored and archived in digital format. Activation procedures which include photic stimulation, hyperventilation and instructing patients to perform simple tasks are done in selected patients.    The EEG is displayed on a monitor screen and can be reviewed using different montages. Computer assisted analysis is employed to detect spike and electrographic seizure activity. The entire record is submitted for computer analysis. The entire recording is visually reviewed and, the times identified by computer analysis as being spikes or seizures are reviewed again.     Compressed spectral analysis (CSA) is also performed on the activity recorded from each individual channel. This is displayed as a power display of frequencies from 0 to 30 Hz over time. The CSA is reviewed looking for asymmetries in power between homologous areas of the scalp and then compared with the original EEG recording.     Recording times  11/19/23 09:08 - 10:27  1 hour 16 minutes     Findings  The short-term video EEG recording during wakefulness contains 8 Hz alpha activity over the posterior head regions. There is mild diffuse slowing in the range of 5-7 Hz.      Photic stimulation and hyperventilation were not performed.     During the recording, the patient became drowsy and fell asleep with symmetric sleep architecture seen.     The EKG channel shows  regular rhythm.    Interpretation  The short-term video EEG shows mild diffuse nonspecific slowing of the background.  No potentially epileptiform activity was seen.

## 2023-11-19 NOTE — PROGRESS NOTES
"Julio Thao - Neurosurgery ([facility])  Endocrinology  Progress Note    Admit Date: 11/16/2023     Mark Coppola is a 73 y.o. 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 Labs obtained two days ago were positive for a corrected calcium of 12.8. Patient is a poor historian owing to recent worsening of confusion potentially in the setting of UTI. I spoke with patients leonora Pitts at bedside.    Endocrinology is consulted to assist with management of hyperglycemia.    Patient was started on dexamethasone for progression of brain mets and he underwent gamma knife with Dr. Ragland on 11/10/23. Since starting steroids he has had significant hyperglycemia for which he was started on BID levemir and mealtime novolog per his primary card doctor. He has had persistently elevated blood glucose levels in the 300s at home and is wearing a DEXCOM now. Hong states that he is not sure regarding patients compliance with insulin injections at home. Patient does not think that he is taking any levemir.    Additional review of the chart shows hypothyroidism. Family at bedside report that this has not been addressed.            Interval HPI:   Overnight events: Dose of scheduled NPH was increased last night from 15u q8h to 20u q8h. This AM BG 150s. Still getting Dexamethasone 4 mg q8h.   Eating:    no diet ordered  Nausea: No  Hypoglycemia and intervention: No  Fever: No  TPN and/or TF: No  If yes, type of TF/TPN and rate: n/a    BP (!) 123/59 (BP Location: Right arm, Patient Position: Lying)   Pulse 71   Temp 98.5 °F (36.9 °C) (Oral)   Resp 16   Ht 5' 5" (1.651 m)   Wt 75.5 kg (166 lb 7.2 oz)   SpO2 97%   BMI 27.70 kg/m²     Labs Reviewed and Include    Recent Labs   Lab 11/19/23  0344   *   CALCIUM " "9.4   ALBUMIN 1.9*   PROT 5.5*      K 4.1   CO2 23      BUN 25*   CREATININE 0.8   ALKPHOS 151*   ALT 33   AST 13   BILITOT 0.4     Lab Results   Component Value Date    WBC 9.92 11/19/2023    HGB 7.5 (L) 11/19/2023    HCT 28.3 (L) 11/19/2023    MCV 90 11/19/2023    PLT 79 (L) 11/19/2023     Recent Labs   Lab 11/17/23  0338   TSH 0.013*   FREET4 1.03     Lab Results   Component Value Date    HGBA1C 9.5 (H) 11/16/2023       Nutritional status:   Body mass index is 27.7 kg/m².  Lab Results   Component Value Date    ALBUMIN 1.9 (L) 11/19/2023    ALBUMIN 1.8 (L) 11/18/2023    ALBUMIN 1.9 (L) 11/17/2023     No results found for: "PREALBUMIN"    Estimated Creatinine Clearance: 75.7 mL/min (based on SCr of 0.8 mg/dL).    Accu-Checks  Recent Labs     11/17/23  0807 11/17/23  1217 11/17/23  1740 11/17/23  2145 11/18/23  0738 11/18/23  1220 11/18/23  1647 11/18/23  2101 11/19/23  0210 11/19/23  0544   POCTGLUCOSE 294* 206* 299* 319* 270* 284* 366* 366* 234* 152*       Current Medications and/or Treatments Impacting Glycemic Control  Immunotherapy:    Immunosuppressants       None          Steroids:   Hormones (From admission, onward)      Start     Stop Route Frequency Ordered    11/17/23 2200  dexAMETHasone tablet 4 mg         -- Oral Every 8 hours 11/17/23 1512    11/16/23 1436  melatonin tablet 6 mg         -- Oral Nightly PRN 11/16/23 1341          Pressors:    Autonomic Drugs (From admission, onward)      None          Hyperglycemia/Diabetes Medications:   Antihyperglycemics (From admission, onward)      Start     Stop Route Frequency Ordered    11/19/23 0600  insulin NPH injection 20 Units         -- SubQ Every 8 hours 11/18/23 2147    11/19/23 0200  insulin aspart U-100 pen 0-15 Units         -- SubQ Before meals, at bedtime and at 0200 11/18/23 2140            ASSESSMENT and PLAN    Renal/  Hypercalcemia of malignancy  Labs obtained in September show low PTH, Normal Vitamin D and undetectable PTHrP    He " is receiving treatment for hypercalcemia of malignancy with Xgeva per oncology, so would defer any further management of hypercalcemia of malignancy to Oncology      Oncology  Metastatic renal cell carcinoma  metastatic RCC s/p nephrectomy with sacroiliac and brain mets presenting with increased confusion last two weeks. Patient recently had gamma knife surgery with Dr. Ragland 11/10/23.   Was on dex outpatient, weaned down due to hyperglycemia, now it has harish restarted  Manage BG as above  Management of metastatic cancer per primary      Endocrine  Steroid-induced hyperglycemia  Hyperglycemia started with initiation of dexamethasone  Current steroids: Dexamethasone 4mg q8h  Does have an elevated hemoglobin A1c now although would question the accuracy given approximately two months of steroid induced hyperglycemia. A1c of 5.8 in 2020 indicates potentially prediabetic  Home regimen prior to admission: Levemir 10u BID, Novolog 5u with meals and low dose correction scale     Endocrinology consulted for BG management.   BG goal 140-180    Given steroid-induced hyperglycemia, MDI regimen was switched on 11/18 to scheduled NPH to be given with dexamethasone doses and aspart correction scale.   - NPH dose was increased from 15u q8h --> 20u qh8 11/18 PM, therefore we have not yet seen the effect of increased dose. No changes for now.    - NPH 20 units q8h given with dexamethasone doses. Please notify our team of plan for steroids. If adjusting dose at all, insulin regimen will need to be adjusted as well.  - Continue Novolog high dose correction scale prn   - Accuchecks ac/hs/0200 if diet is added; q4h if NPO or sugar-free CLD. Diet per primary.  - Hypoglycemia protocol in place      ** Please notify Endocrine for any change and/or advance in diet**  ** Please call Endocrine for any BG related issues **    Discharge Planning:   TBD. Please notify endocrinology prior to discharge.    Postoperative hypothyroidism  Labs from  [facility]          Patients taking Sutent may have increasing levothyroxine requirements while on Sutent. He has taken this medicine in the past, although not clearly recently.  Also a question of compliance with medications at home given recent worsening of confusion and unclear if he is taking Levothyroxine appropriately.    - TFTs show subclinical hyperthyroidism, can reevaluate as an outpatient due to impact of non-thyroidal illness in acute illness on inpatient TFTs        María Nixon MD  Endocrinology  [facility] - Neurosurgery ([facility])

## 2023-11-19 NOTE — ASSESSMENT & PLAN NOTE
Patient's FSGs are uncontrolled due to hyperglycemia on current medication regimen.  Last A1c reviewed-   Lab Results   Component Value Date    HGBA1C 9.5 (H) 11/16/2023     Most recent fingerstick glucose reviewed-   Recent Labs   Lab 11/17/23  2145 11/18/23  0738 11/18/23  1220 11/18/23  1647   POCTGLUCOSE 319* 270* 284* 366*       Current correctional scale  Low  Maintain anti-hyperglycemic dose as follows-   Antihyperglycemics (From admission, onward)    Start     Stop Route Frequency Ordered    11/18/23 1400  insulin NPH injection 15 Units         -- SubQ Every 8 hours 11/18/23 1146    11/18/23 1224  insulin aspart U-100 pen 0-15 Units         -- SubQ Before meals & nightly PRN 11/18/23 1125        Hold Oral hypoglycemics while patient is in the hospital.    Endocrinology consult appreciated, insulin Rx. Adjusted.   Remains hyperglycemic today. Endocrine to adjust Rx.

## 2023-11-19 NOTE — SUBJECTIVE & OBJECTIVE
Interval History: 11/19: D/w Dr. Ragland, options for patient are to either wean steroids slowly, neuro onc referral for avastin (would reduce swelling without glucose spikes), or to take the R frontal mass out. Will d/w family    Medications:  Continuous Infusions:  Scheduled Meds:   ciprofloxacin HCl  500 mg Oral BID    dexAMETHasone  4 mg Oral Q8H    insulin aspart U-100  0-15 Units Subcutaneous AC + HS + 0200    insulin NPH  20 Units Subcutaneous Q8H    levothyroxine  0.2 mg Oral Before breakfast    pantoprazole  40 mg Oral BID AC    senna-docusate 8.6-50 mg  1 tablet Oral BID     PRN Meds:acetaminophen, dextrose 10%, dextrose 10%, dextrose 10%, dextrose 10%, glucagon (human recombinant), glucose, glucose, HYDROcodone-acetaminophen, melatonin, sodium chloride 0.9%     Review of Systems  Objective:     Weight: 75.5 kg (166 lb 7.2 oz)  Body mass index is 27.7 kg/m².  Vital Signs (Most Recent):  Temp: 98.5 °F (36.9 °C) (11/19/23 0728)  Pulse: 71 (11/19/23 0728)  Resp: 16 (11/19/23 0728)  BP: (!) 123/59 (11/19/23 0728)  SpO2: 97 % (11/19/23 0728) Vital Signs (24h Range):  Temp:  [97.6 °F (36.4 °C)-98.9 °F (37.2 °C)] 98.5 °F (36.9 °C)  Pulse:  [63-71] 71  Resp:  [16-24] 16  SpO2:  [90 %-97 %] 97 %  BP: (120-137)/(55-64) 123/59                                 Physical Exam       Show:Clear all  [x]Written[x]Templated[x]Copied    Added by:  [x]Romie Angel MD    [x]Loan for details  Julio sarah - Neurosurgery ([facility])  Neurosurgery  Consult Note     Consults  Subjective:      Chief Complaint/Reason for Admission: confusion     History of Present Illness: 73M PMH metastatic RCC s/p nephrectomy with multiple opulm, sacroiliac, brain mets presenting with increased confusion last two weeks. Patient recently had gamma knife surgery with Dr. Ragland 11/10/23. Was on Dex 4mg BID last month when glucose spiked into 800s, tapered down subsequently. Back on Dex 4q8 right now with 15u NPH Q8h and SSI. Aox1 on examination with higher  order confusion, no other deficits. Recommend Dex 4q6 with close glucose monitoring until outpatient clinic follow up             Medications Prior to Admission   Medication Sig Dispense Refill Last Dose    ciprofloxacin HCl (CIPRO) 500 MG tablet Take 1 tablet (500 mg total) by mouth 2 (two) times daily. for 10 days 20 tablet 0 Unknown    dexAMETHasone (DECADRON) 2 MG tablet Take 2 mg by mouth 2 times daily with meals.     Unknown    esomeprazole (NEXIUM) 40 MG capsule Take 40 mg by mouth 2 (two) times daily.     Unknown    famotidine-calcium carbonate-magnesium hydroxide (PEPCID COMPLETE) chewable tablet Take 1 tablet by mouth nightly as needed.     Unknown    levothyroxine (SYNTHROID) 200 MCG tablet Take 200 mcg by mouth before breakfast.   0 Unknown    ondansetron (ZOFRAN-ODT) 4 MG TbDL DISSOLVE 1 TABLET ON THE TONGUE EVERY 4 HOURS AS NEEDED FOR NAUSEA     Unknown               Review of patient's allergies indicates:   Allergen Reactions    Iodinated contrast media Rash       Delayed non itching rash after CT scan.    Per Nsg Notes 4/30/19 - premed with Benadryl 25mg tab, and no reaction noted post CT.   8/6/19 did well with Benadryl 25 mg PO x1         No past medical history on file.        Past Surgical History:   Procedure Laterality Date    ENDOSCOPY OF PROXIMAL SMALL INTESTINE N/A 8/18/2022     Procedure: Upper DBE with general anesthesia;  Surgeon: Michael Silveira MD;  [facility];  Service: Endoscopy;  Laterality: N/A;    RADICAL NEPHRECTOMY Right 08/18/2016    THYROIDECTOMY          Family History    None              Tobacco Use    Smoking status: Never    Smokeless tobacco: Not on file   Substance and Sexual Activity    Alcohol use: Not on file    Drug use: Not on file    Sexual activity: Not on file      Review of Systems   Constitutional:  Negative for activity change, chills and fever.   HENT:  Negative for hearing loss and trouble swallowing.    Eyes:  Negative for visual disturbance.    Respiratory:  Negative for chest tightness.    Gastrointestinal:  Negative for abdominal distention.   Endocrine: Negative for cold intolerance and heat intolerance.   Genitourinary:  Negative for difficulty urinating.   Musculoskeletal:  Negative for arthralgias.   Allergic/Immunologic: Negative for immunocompromised state.   Neurological:  Negative for weakness, numbness and headaches.   Psychiatric/Behavioral:  Positive for confusion.       Objective:      Weight: 75.5 kg (166 lb 7.2 oz)  Body mass index is 27.7 kg/m².  Vital Signs (Most Recent):  Temp: 97.8 °F (36.6 °C) (11/18/23 1905)  Pulse: 68 (11/18/23 1905)  Resp: 18 (11/18/23 1905)  BP: (!) 122/58 (11/18/23 1905)  SpO2: 95 % (11/18/23 1905) Vital Signs (24h Range):  Temp:  [97.5 °F (36.4 °C)-97.9 °F (36.6 °C)] 97.8 °F (36.6 °C)  Pulse:  [66-73] 68  Resp:  [18-24] 18  SpO2:  [90 %-98 %] 95 %  BP: (122-138)/(58-64) 122/58                                        Physical Exam  Vitals and nursing note reviewed.   HENT:      Head: Normocephalic.   Eyes:      Extraocular Movements: Extraocular movements intact.   Cardiovascular:      Rate and Rhythm: Normal rate.   Pulmonary:      Effort: Pulmonary effort is normal.   Abdominal:      Palpations: Abdomen is soft.   Neurological:      Mental Status: He is alert.               E4V4M6  Aox1  Higher order confusion  Cni, PERRL  FC x4 AG  SILT            Neurosurgery Physical Exam    Significant Labs:  Recent Labs   Lab 11/18/23 0301 11/19/23  0344   * 203*   * 137   K 4.3 4.1    104   CO2 23 23   BUN 30* 25*   CREATININE 0.8 0.8   CALCIUM 9.6 9.4     Recent Labs   Lab 11/18/23 0301 11/19/23  0344   WBC 9.77 9.92   HGB 7.5* 7.5*   HCT 27.7* 28.3*   PLT 70* 79*     Recent Labs   Lab 11/18/23 0301   INR 1.1     Microbiology Results (last 7 days)       ** No results found for the last 168 hours. **          All pertinent labs from the last 24 hours have been reviewed.    Significant Diagnostics:  I  have reviewed all pertinent imaging results/findings within the past 24 hours.  I have reviewed and interpreted all pertinent imaging results/findings within the past 24 hours.  MRI Brain W WO Contrast    Result Date: 11/18/2023  Scattered enhancing lesions brain parenchyma remain concerning for sequela metastatic disease please note foci in the cerebellum extends to the folia as well as probable dural based lesion along the right frontal lobe.  There is no new lesion or significant increased size lesion Ventricles relatively similar without hydrocephalus Continued ill-defined enhancement right frontal periventricular white matter Clinical correlation and continued follow-up advised.  No definite new lesion or is increased size lesion to suggest significant disease progression. Clinical correlation and continued follow-up advised. Electronically signed by: Chang Lopez DO Date:    11/18/2023 Time:    16:24

## 2023-11-19 NOTE — PROGRESS NOTES
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 He was diagnosed in about 7/16, underwent left nephrectomy and was in remission until about 8/20 when a pleural based nodule appeared. Progression to sacroiliac lytic lesion about 5/21, developed hypoercalcemia and GIB, now on chronic transfusion needs. Recently found to have multiple scattered intracerebral lesions. Started on Dexamethasone and recently underwent gamma knife surgery of a frontal cerebral lesion (Dr. Ragland / Dr. Velazquez).     He is following with Dr. ISIDRO Michael at [facility] as a 2nd opinion for further cancer directed care. He has had most of his care at [facility] (Dr. Corado, Dr. Hinson) until now.     Most recently he was noted to have increased confusion. A UTI was suspected and Ciprofloxacin started empirically. He has known Hypercalcemia of malignancy and had received Xgeva (Denosumab) at [facility] with initial hypocalcemia, now rising Ca 2+ again.     Additionally, profound hyperglycemia had been noted since initiation of Dexamethasone with BG as high as 800mg/dl prompting dose reduction.     The pt. Has been reluctant to present to the ED for confusion but his wife had insisted and he agree to present today.     Evaluation in the ED shows new thrombocytopenia (98 from 298), [place]. Calcium of 11.8. CT Head with previously identified lesions but no new findings. Given a dose of IV Ciprofloxacin and 1L LR and admitted.     On my visit, the pt. Daughter and wife give most of the history. The pt. Is alert and awake and conversational but  has tangential thoughts and goes off topic frequently.     Case discussed with Dr. Morales, family friend, who has helped manage Dr. Coppola's condition outpatient.     Overview/Hospital Course:  Patient w metastatic RCC folowed by Dr Michael admitted 11/16 for AMS likley mulitfactorial with hypercalcemia (Ca 10.7 corrected 12.5), hyperglycemia, recent gamma knife for brain met on steroids. Also recent thrombocytopenia . Ca improved on IVF, Glucose improved with increase in insulin. Platelets continue to drift down. Mental status improved initially but mild confusion persisting    Interval hx    11/17 no new events over night. BG remains elevated but improved overall. Awake, alert and reading the paper. However keeps talking about topics he has seen on TV this morning rather than engaging in medical discussion.   Daughter at the bedside.     11/18 Mental status improved overnight  - possibly back to recent baseline although this afternoon no so much as he would drift off topic insistently to talk about nonsensically about a TV show that he watched yesterday. Hematology/ oncology, neurology and endocrinology follow noted with appreciation. Family (wife Svitlana  and daughter (Tatyana ) at bed side for several discussions throughout the day      Review of Systems   Constitutional:  Negative for activity change and appetite change.   Respiratory:  Negative for apnea and chest tightness.    Cardiovascular:  Negative for chest pain.   Gastrointestinal:  Negative for abdominal distention.   Genitourinary:  Negative for difficulty urinating and dysuria.   Neurological:  Negative for dizziness.   Hematological:  Negative for adenopathy.   Psychiatric/Behavioral:  Positive for behavioral problems.      Objective:     Vital Signs (Most Recent):  Temp: 97.6 °F (36.4 °C) (11/18/23 1555)  Pulse: 71 (11/18/23 1555)  Resp: 20 (11/18/23 1555)  BP: 137/64 (11/18/23 1555)  SpO2: (!) 94 % (11/18/23 1555) Vital Signs  (24h Range):  Temp:  [97.5 °F (36.4 °C)-98.1 °F (36.7 °C)] 97.6 °F (36.4 °C)  Pulse:  [66-73] 71  Resp:  [18-24] 20  SpO2:  [90 %-98 %] 94 %  BP: (123-138)/(59-64) 137/64     Weight: 75.5 kg (166 lb 7.2 oz)  Body mass index is 27.7 kg/m².    Intake/Output Summary (Last 24 hours) at 11/18/2023 1749  Last data filed at 11/18/2023 0600  Gross per 24 hour   Intake 480 ml   Output 400 ml   Net 80 ml         Physical Exam  Constitutional:       General: He is not in acute distress.     Appearance: He is not ill-appearing.   Cardiovascular:      Rate and Rhythm: Normal rate and regular rhythm.      Heart sounds: No murmur heard.  Pulmonary:      Effort: No respiratory distress.   Abdominal:      General: There is no distension.      Palpations: There is no mass.   Neurological:      General: No focal deficit present.      Mental Status: He is alert.      Comments: Oriented most of the time but forgetful and at times talking nonsense             Recent Labs   Lab 11/17/23  0338 11/18/23  0301   WBC 9.28 9.77   HGB 7.9* 7.5*   HCT 28.6* 27.7*   PLT 77* 70*       Recent Labs   Lab 11/17/23  0338 11/18/23  0301   * 133*   K 4.4 4.3    103   CO2 22* 23   * 225*   BUN 39* 30*   CREATININE 1.0 0.8   CALCIUM 9.8 9.6   PROT 5.6* 5.4*   ALBUMIN 1.9* 1.8*   BILITOT 0.3 0.4   ALKPHOS 155* 149*   AST 13 13   ALT 30 31   ANIONGAP 8 7*          Assessment/Plan:      * Encephalopathy, metabolic  A/  AMS likely multifactorial including hypercalcemia, hyperglycemia, frontal lobe tumor (metastasis) s/p gamma knife with edema (?),     Mental status improved and possibly back to recent baseline but still forgetful  Occasional tangential and nonsense speech.   MRI brain today unchanged from prior.   Hypercalcemia improved w IVF and hyperglycemia improved with increase in insulin.   Dexamethasone increased yesterday from 2 mg bid to 4 mg tid    P/  Neurology follow up  EEG  Reduce dexamethasone??          Hypercalcemia of  malignancy  A/  Xgeva (Denosumab) at [facility] prior to admission  Improved with IVF  Ca 10.7 (11/14) > 10.3 > 9.8 > 9.6 (11/18)  Ca (corrected) 12.5 (11/14) > 10.3 (11/16) > 9.6(11/18). Ref 8.7-10.5  Ionized Ca 1.34 (11/16)    P/   Zometa 4 mg IV today (rec by oncology)   Oncology follow up next week (outpatient)      Thrombocytopenia  A/  Platelets continue to drift down  Plts 298 (11/14) > 98 > 77 > 70 (11/18)  Haptoglobin 317 (11/18). retic 2.8. INR 1.1, fibrinogen 541  No apparent cause    P/  Hematology follow up             Metastatic renal cell carcinoma  A/ 2016 diagnosed.   Follows with Dr. Corado at [facility],  [place] 2nd [facility] [place]. [place] [facility]   S/p left nephrectomy  Remission until 8/20 - pleural based nodule, bony lesions, now intracranial lesions  recent Gamma Knife for frontal brain mass - Dr. Velazquez/ Dr. Ragland ( 11/23)  Has had progression on Keytruda it appears.   Unclear what options remain given concerns of performance status, GIB    P/   Oncology follow up          Type 2 diabetes mellitus without complication  Patient's FSGs are uncontrolled due to hyperglycemia on current medication regimen.  Last A1c reviewed-   Lab Results   Component Value Date    HGBA1C 9.5 (H) 11/16/2023     Most recent fingerstick glucose reviewed-   Recent Labs   Lab 11/17/23  2145 11/18/23  0738 11/18/23  1220 11/18/23  1647   POCTGLUCOSE 319* 270* 284* 366*       Current correctional scale  Low  Maintain anti-hyperglycemic dose as follows-   Antihyperglycemics (From admission, onward)      Start     Stop Route Frequency Ordered    11/18/23 1400  insulin NPH injection 15 Units         -- SubQ Every 8 hours 11/18/23 1146    11/18/23 1224  insulin aspart U-100 pen 0-15 Units         -- SubQ Before meals & nightly PRN 11/18/23 1125          Hold Oral hypoglycemics while patient is in the hospital.    Endocrinology consult appreciated, insulin Rx. Adjusted.   Remains hyperglycemic today. Endocrine to adjust Rx.      Steroid-induced hyperglycemia  A/  Likely 2/2 dexamethasone  BL A1C in mid 5's    P/  Taper dexamethasone (neurosurgery)  Diet  Insulin as above          Essential hypertension  A/  Not hypertensive during this admission  On no BP meds    P/  Continue to follow as outpatient  BP meds if needed per guidelines         Acute cystitis without hematuria  A/  complained about dysuria outpatient  started on Cipro empirically.   UA neg but UC > E coli  0 - 50 k sensitive to Ciprofloxacin.     P/  Treat 5 d (stop 11/19)        Postoperative hypothyroidism  A/  TSH 0.013  FT4 1.03    P/  Continue thyroxine 200 mg daily    Anemia  Patient's anemia is currently controlled. Has not received any PRBCs to date. Etiology likely d/t chronic blood loss  Current CBC reviewed-   Lab Results   Component Value Date    HGB 7.5 (L) 11/18/2023    HCT 27.7 (L) 11/18/2023       Lab Results   Component Value Date    FERRITIN 446 (H) 11/17/2023     Iron studies reviewed, suspect anemia of chronic disease but has had GIB in the past.   Drop in H/H likely dilution. - no indication for transfusion.       VTE Risk Mitigation (From admission, onward)           Ordered     IP VTE HIGH RISK PATIENT  Once         11/16/23 1341     Place sequential compression device  Until discontinued         11/16/23 1341                    Discharge Planning   ZAIN: 11/18/2023     Code Status: Full Code   Is the patient medically ready for discharge?:     Reason for patient still in hospital (select all that apply): Patient trending condition and Consult recommendations  Discharge Plan A: Home with family, Home Health        Adam Ness MD  Department of Hospital Medicine   [facility] - Neurosurgery ([facility])

## 2023-11-19 NOTE — ASSESSMENT & PLAN NOTE
73M PMH metastatic RCC s/p nephrectomy with multiple opulm, sacroiliac, brain mets presenting with increased confusion last two weeks. Patient recently had gamma knife surgery with Dr. Ragland 11/10/23. Was on Dex 4mg BID last month when glucose spiked into 800s, tapered down subsequently. Back on Dex 4q8 right now with 15u NPH Q8h and SSI.     Imaging:  MRI brain 11/18: scattered mets, no new lesions since 10/31    Plan:  --all labs and significant diagnostics reviewed  --admitted HM; q4h nc/vs  --D/w Dr. Ragland, options for patient are to either wean steroids slowly, neuro onc referral for avastin (would reduce swelling without glucose spikes), or to take the R frontal mass out. Will d/w family

## 2023-11-19 NOTE — ASSESSMENT & PLAN NOTE
Labs from [facility]          Patients taking Sutent may have increasing levothyroxine requirements while on Sutent. He has taken this medicine in the past, although not clearly recently.  Also a question of compliance with medications at home given recent worsening of confusion and unclear if he is taking Levothyroxine appropriately.    - TFTs show subclinical hyperthyroidism, can reevaluate as an outpatient due to impact of non-thyroidal illness in acute illness on inpatient TFTs

## 2023-11-19 NOTE — ASSESSMENT & PLAN NOTE
A/  Likely 2/2 dexamethasone  BL A1C in mid 5's    P/  Taper dexamethasone (neurosurgery)  Diet  Insulin as above

## 2023-11-19 NOTE — CONSULTS
Julio Thao - Neurosurgery ([facility])  Neurosurgery  Consult Note    Consults  Subjective:     Chief Complaint/Reason for Admission: confusion    History of Present Illness: 73M PMH metastatic RCC s/p nephrectomy with multiple opulm, sacroiliac, brain mets presenting with increased confusion last two weeks. Patient recently had gamma knife surgery with Dr. Ragland 11/10/23. Was on Dex 4mg BID last month when glucose spiked into 800s, tapered down subsequently. Back on Dex 4q8 right now with 15u NPH Q8h and SSI. Aox1 on examination with higher order confusion, no other deficits. Recommend Dex 4q6 with close glucose monitoring until outpatient clinic follow up    Medications Prior to Admission   Medication Sig Dispense Refill Last Dose    ciprofloxacin HCl (CIPRO) 500 MG tablet Take 1 tablet (500 mg total) by mouth 2 (two) times daily. for 10 days 20 tablet 0 Unknown    dexAMETHasone (DECADRON) 2 MG tablet Take 2 mg by mouth 2 times daily with meals.   Unknown    esomeprazole (NEXIUM) 40 MG capsule Take 40 mg by mouth 2 (two) times daily.   Unknown    famotidine-calcium carbonate-magnesium hydroxide (PEPCID COMPLETE) chewable tablet Take 1 tablet by mouth nightly as needed.   Unknown    levothyroxine (SYNTHROID) 200 MCG tablet Take 200 mcg by mouth before breakfast.  0 Unknown    ondansetron (ZOFRAN-ODT) 4 MG TbDL DISSOLVE 1 TABLET ON THE TONGUE EVERY 4 HOURS AS NEEDED FOR NAUSEA   Unknown       Review of patient's allergies indicates:   Allergen Reactions    Iodinated contrast media Rash     Delayed non itching rash after CT scan.    Per Nsg Notes 4/30/19 - premed with Benadryl 25mg tab, and no reaction noted post CT.   8/6/19 did well with Benadryl 25 mg PO x1       No past medical history on file.  Past Surgical History:   Procedure Laterality Date    ENDOSCOPY OF PROXIMAL SMALL INTESTINE N/A 8/18/2022    Procedure: Upper DBE with general anesthesia;  Surgeon: Michael Silveira MD;  Location: [facility];  Service:  Endoscopy;  Laterality: N/A;    RADICAL NEPHRECTOMY Right 08/18/2016    THYROIDECTOMY       Family History    None       Tobacco Use    Smoking status: Never    Smokeless tobacco: Not on file   Substance and Sexual Activity    Alcohol use: Not on file    Drug use: Not on file    Sexual activity: Not on file     Review of Systems   Constitutional:  Negative for activity change, chills and fever.   HENT:  Negative for hearing loss and trouble swallowing.    Eyes:  Negative for visual disturbance.   Respiratory:  Negative for chest tightness.    Gastrointestinal:  Negative for abdominal distention.   Endocrine: Negative for cold intolerance and heat intolerance.   Genitourinary:  Negative for difficulty urinating.   Musculoskeletal:  Negative for arthralgias.   Allergic/Immunologic: Negative for immunocompromised state.   Neurological:  Negative for weakness, numbness and headaches.   Psychiatric/Behavioral:  Positive for confusion.      Objective:     Weight: 75.5 kg (166 lb 7.2 oz)  Body mass index is 27.7 kg/m².  Vital Signs (Most Recent):  Temp: 97.8 °F (36.6 °C) (11/18/23 1905)  Pulse: 68 (11/18/23 1905)  Resp: 18 (11/18/23 1905)  BP: (!) 122/58 (11/18/23 1905)  SpO2: 95 % (11/18/23 1905) Vital Signs (24h Range):  Temp:  [97.5 °F (36.4 °C)-97.9 °F (36.6 °C)] 97.8 °F (36.6 °C)  Pulse:  [66-73] 68  Resp:  [18-24] 18  SpO2:  [90 %-98 %] 95 %  BP: (122-138)/(58-64) 122/58                                 Physical Exam  Vitals and nursing note reviewed.   HENT:      Head: Normocephalic.   Eyes:      Extraocular Movements: Extraocular movements intact.   Cardiovascular:      Rate and Rhythm: Normal rate.   Pulmonary:      Effort: Pulmonary effort is normal.   Abdominal:      Palpations: Abdomen is soft.   Neurological:      Mental Status: He is alert.            E4V4M6  Aox1  Higher order confusion  Cni, PERRL  FC x4 AG  SILT        Significant Labs:  Recent Labs   Lab 11/17/23  0338 11/18/23  0301   * 225*   NA  132* 133*   K 4.4 4.3    103   CO2 22* 23   BUN 39* 30*   CREATININE 1.0 0.8   CALCIUM 9.8 9.6     Recent Labs   Lab 11/17/23  0338 11/18/23  0301   WBC 9.28 9.77   HGB 7.9* 7.5*   HCT 28.6* 27.7*   PLT 77* 70*     Recent Labs   Lab 11/18/23  0301   INR 1.1     Microbiology Results (last 7 days)       ** No results found for the last 168 hours. **          All pertinent labs from the last 24 hours have been reviewed.    Significant Diagnostics:  I have reviewed all pertinent imaging results/findings within the past 24 hours.  I have reviewed and interpreted all pertinent imaging results/findings within the past 24 hours.  MRI Brain W WO Contrast    Result Date: 11/18/2023  Scattered enhancing lesions brain parenchyma remain concerning for sequela metastatic disease please note foci in the cerebellum extends to the folia as well as probable dural based lesion along the right frontal lobe.  There is no new lesion or significant increased size lesion Ventricles relatively similar without hydrocephalus Continued ill-defined enhancement right frontal periventricular white matter Clinical correlation and continued follow-up advised.  No definite new lesion or is increased size lesion to suggest significant disease progression. Clinical correlation and continued follow-up advised. Electronically signed by: Chang Lopez DO Date:    11/18/2023 Time:    16:24     Assessment/Plan:     Metastatic renal cell carcinoma  73M PMH metastatic RCC s/p nephrectomy with multiple opulm, sacroiliac, brain mets presenting with increased confusion last two weeks. Patient recently had gamma knife surgery with Dr. Ragland 11/10/23. Was on Dex 4mg BID last month when glucose spiked into 800s, tapered down subsequently. Back on Dex 4q8 right now with 15u NPH Q8h and SSI.     Imaging:  MRI brain 11/18: scattered mets, no new lesions since 10/31    Plan:  --all labs and significant diagnostics reviewed  --admitted HM; q4h nc/vs  --recommend  dex 4q6 for now with close glucose monitoring  --Will discuss maintaining dex 4q6 upon discharge vs taper to smaller dose              Romie Angel MD  Neurosurgery  Julio Thao - Neurosurgery ([facility])

## 2023-11-19 NOTE — ASSESSMENT & PLAN NOTE
A/  Not hypertensive during this admission  On no BP meds    P/  Continue to follow as outpatient  BP meds if needed per guidelines

## 2023-11-19 NOTE — HOSPITAL COURSE
11/19: D/w Dr. Ragland, options for patient are to either wean steroids slowly, neuro onc referral for avastin (would reduce swelling without glucose spikes), or to take the R frontal mass out. Will d/w family

## 2023-11-19 NOTE — ASSESSMENT & PLAN NOTE
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 Diagnosed initially in 2016, he has undergone various treatments, including nephrectomy and gamma knife surgery. Currently, his symptoms include tangential thoughts and a focus on non-medical topics, suggesting cortical involvement. His recent ED visit revealed thrombocytopenia and elevated calcium levels, although a CT scan showed no new cerebral lesions. Overall, Dr. Coppola's condition is complex, with neurological symptoms likely influenced by his oncological history, recent infections, and metabolic disturbances.    From a neurological standpoint, the neurology team assesses the cause of his symptoms as multifactorial, with contributions from the UTI, brain metastasis post-radiation, hyperglycemia,, and slight hyponatremia. Given the potential risk of seizures in this patient, a routine EEG is warranted to evaluate for a seizure focus or susceptibility. This evaluation will help determine the need for antiseizure medications. If the EEG is negative, no further seizure-related workup will be necessary. The neurology team plans to follow Dr. Coppola's case peripherally, considering the complex interplay of his multiple medical conditions.    Recommendations  -- Routine EEG, pending  -- If negative, no further inpatient neurologic workup needed, otherwise, will consider AEDs  -- Thank you for the consult. Neurology will follow up peripherally

## 2023-11-19 NOTE — ASSESSMENT & PLAN NOTE
A/  AMS likely multifactorial including hypercalcemia, hyperglycemia, frontal lobe tumor (metastasis) s/p gamma knife with edema (?),     Mental status improved and possibly back to recent baseline but still forgetful  Occasional tangential and nonsense speech.   MRI brain today unchanged from prior.   Hypercalcemia improved w IVF and hyperglycemia improved with increase in insulin.   Dexamethasone increased yesterday from 2 mg bid to 4 mg tid    P/  Neurology follow up  EEG  Reduce dexamethasone??

## 2023-11-19 NOTE — PROGRESS NOTES
Julio Thao - Neurosurgery ([facility])  Neurosurgery  Progress Note    Subjective:     History of Present Illness: 73M PMH metastatic RCC s/p nephrectomy with multiple opulm, sacroiliac, brain mets presenting with increased confusion last two weeks. Patient recently had gamma knife surgery with Dr. Ragland 11/10/23. Was on Dex 4mg BID last month when glucose spiked into 800s, tapered down subsequently. Back on Dex 4q8 right now with 15u NPH Q8h and SSI. Aox1 on examination with higher order confusion, no other deficits. Recommend Dex 4q6 with close glucose monitoring until outpatient clinic follow up    Post-Op Info:  * No surgery found *       Interval History: 11/19: D/w Dr. Ragland, options for patient are to either wean steroids slowly, neuro onc referral for avastin (would reduce swelling without glucose spikes), or to take the R frontal mass out. Will d/w family    Medications:  Continuous Infusions:  Scheduled Meds:   ciprofloxacin HCl  500 mg Oral BID    dexAMETHasone  4 mg Oral Q8H    insulin aspart U-100  0-15 Units Subcutaneous AC + HS + 0200    insulin NPH  20 Units Subcutaneous Q8H    levothyroxine  0.2 mg Oral Before breakfast    pantoprazole  40 mg Oral BID AC    senna-docusate 8.6-50 mg  1 tablet Oral BID     PRN Meds:acetaminophen, dextrose 10%, dextrose 10%, dextrose 10%, dextrose 10%, glucagon (human recombinant), glucose, glucose, HYDROcodone-acetaminophen, melatonin, sodium chloride 0.9%     Review of Systems  Objective:     Weight: 75.5 kg (166 lb 7.2 oz)  Body mass index is 27.7 kg/m².  Vital Signs (Most Recent):  Temp: 98.5 °F (36.9 °C) (11/19/23 0728)  Pulse: 71 (11/19/23 0728)  Resp: 16 (11/19/23 0728)  BP: (!) 123/59 (11/19/23 0728)  SpO2: 97 % (11/19/23 0728) Vital Signs (24h Range):  Temp:  [97.6 °F (36.4 °C)-98.9 °F (37.2 °C)] 98.5 °F (36.9 °C)  Pulse:  [63-71] 71  Resp:  [16-24] 16  SpO2:  [90 %-97 %] 97 %  BP: (120-137)/(55-64) 123/59                                 Physical Exam        Show:Clear all  [x]Written[x]Templated[x]Copied    Added by:  [x]Romie Angel MD    [x]Loan for details  Julio Thao - Neurosurgery ([facility])  Neurosurgery  Consult Note     Consults  Subjective:      Chief Complaint/Reason for Admission: confusion     History of Present Illness: 73M PMH metastatic RCC s/p nephrectomy with multiple opulm, sacroiliac, brain mets presenting with increased confusion last two weeks. Patient recently had gamma knife surgery with Dr. Ragland 11/10/23. Was on Dex 4mg BID last month when glucose spiked into 800s, tapered down subsequently. Back on Dex 4q8 right now with 15u NPH Q8h and SSI. Aox1 on examination with higher order confusion, no other deficits. Recommend Dex 4q6 with close glucose monitoring until outpatient clinic follow up             Medications Prior to Admission   Medication Sig Dispense Refill Last Dose    ciprofloxacin HCl (CIPRO) 500 MG tablet Take 1 tablet (500 mg total) by mouth 2 (two) times daily. for 10 days 20 tablet 0 Unknown    dexAMETHasone (DECADRON) 2 MG tablet Take 2 mg by mouth 2 times daily with meals.     Unknown    esomeprazole (NEXIUM) 40 MG capsule Take 40 mg by mouth 2 (two) times daily.     Unknown    famotidine-calcium carbonate-magnesium hydroxide (PEPCID COMPLETE) chewable tablet Take 1 tablet by mouth nightly as needed.     Unknown    levothyroxine (SYNTHROID) 200 MCG tablet Take 200 mcg by mouth before breakfast.   0 Unknown    ondansetron (ZOFRAN-ODT) 4 MG TbDL DISSOLVE 1 TABLET ON THE TONGUE EVERY 4 HOURS AS NEEDED FOR NAUSEA     Unknown               Review of patient's allergies indicates:   Allergen Reactions    Iodinated contrast media Rash       Delayed non itching rash after CT scan.    Per Nsg Notes 4/30/19 - premed with Benadryl 25mg tab, and no reaction noted post CT.   8/6/19 did well with Benadryl 25 mg PO x1         No past medical history on file.        Past Surgical History:   Procedure Laterality Date    ENDOSCOPY OF PROXIMAL  SMALL INTESTINE N/A 8/18/2022     Procedure: Upper DBE with general anesthesia;  Surgeon: Michael Silveira MD;  Location: [facility];  Service: Endoscopy;  Laterality: N/A;    RADICAL NEPHRECTOMY Right 08/18/2016    THYROIDECTOMY          Family History    None              Tobacco Use    Smoking status: Never    Smokeless tobacco: Not on file   Substance and Sexual Activity    Alcohol use: Not on file    Drug use: Not on file    Sexual activity: Not on file      Review of Systems   Constitutional:  Negative for activity change, chills and fever.   HENT:  Negative for hearing loss and trouble swallowing.    Eyes:  Negative for visual disturbance.   Respiratory:  Negative for chest tightness.    Gastrointestinal:  Negative for abdominal distention.   Endocrine: Negative for cold intolerance and heat intolerance.   Genitourinary:  Negative for difficulty urinating.   Musculoskeletal:  Negative for arthralgias.   Allergic/Immunologic: Negative for immunocompromised state.   Neurological:  Negative for weakness, numbness and headaches.   Psychiatric/Behavioral:  Positive for confusion.       Objective:      Weight: 75.5 kg (166 lb 7.2 oz)  Body mass index is 27.7 kg/m².  Vital Signs (Most Recent):  Temp: 97.8 °F (36.6 °C) (11/18/23 1905)  Pulse: 68 (11/18/23 1905)  Resp: 18 (11/18/23 1905)  BP: (!) 122/58 (11/18/23 1905)  SpO2: 95 % (11/18/23 1905) Vital Signs (24h Range):  Temp:  [97.5 °F (36.4 °C)-97.9 °F (36.6 °C)] 97.8 °F (36.6 °C)  Pulse:  [66-73] 68  Resp:  [18-24] 18  SpO2:  [90 %-98 %] 95 %  BP: (122-138)/(58-64) 122/58                                        Physical Exam  Vitals and nursing note reviewed.   HENT:      Head: Normocephalic.   Eyes:      Extraocular Movements: Extraocular movements intact.   Cardiovascular:      Rate and Rhythm: Normal rate.   Pulmonary:      Effort: Pulmonary effort is normal.   Abdominal:      Palpations: Abdomen is soft.   Neurological:      Mental Status: He is alert.                E4V4M6  Aox1  Higher order confusion  Cni, PERRL  FC x4 AG  SILT            Neurosurgery Physical Exam    Significant Labs:  Recent Labs   Lab 11/18/23  0301 11/19/23  0344   * 203*   * 137   K 4.3 4.1    104   CO2 23 23   BUN 30* 25*   CREATININE 0.8 0.8   CALCIUM 9.6 9.4     Recent Labs   Lab 11/18/23  0301 11/19/23  0344   WBC 9.77 9.92   HGB 7.5* 7.5*   HCT 27.7* 28.3*   PLT 70* 79*     Recent Labs   Lab 11/18/23  0301   INR 1.1     Microbiology Results (last 7 days)       ** No results found for the last 168 hours. **          All pertinent labs from the last 24 hours have been reviewed.    Significant Diagnostics:  I have reviewed all pertinent imaging results/findings within the past 24 hours.  I have reviewed and interpreted all pertinent imaging results/findings within the past 24 hours.  MRI Brain W WO Contrast    Result Date: 11/18/2023  Scattered enhancing lesions brain parenchyma remain concerning for sequela metastatic disease please note foci in the cerebellum extends to the folia as well as probable dural based lesion along the right frontal lobe.  There is no new lesion or significant increased size lesion Ventricles relatively similar without hydrocephalus Continued ill-defined enhancement right frontal periventricular white matter Clinical correlation and continued follow-up advised.  No definite new lesion or is increased size lesion to suggest significant disease progression. Clinical correlation and continued follow-up advised. Electronically signed by: Chang Lopez DO Date:    11/18/2023 Time:    16:24     Assessment/Plan:     Metastatic renal cell carcinoma  73M PMH metastatic RCC s/p nephrectomy with multiple opulm, sacroiliac, brain mets presenting with increased confusion last two weeks. Patient recently had gamma knife surgery with Dr. Ragland 11/10/23. Was on Dex 4mg BID last month when glucose spiked into 800s, tapered down subsequently. Back on Dex 4q8 right now  with 15u NPH Q8h and SSI.     Imaging:  MRI brain 11/18: scattered mets, no new lesions since 10/31    Plan:  --all labs and significant diagnostics reviewed  --admitted HM; q4h nc/vs  --D/w Dr. Ragland, options for patient are to either wean steroids slowly, neuro onc referral for avastin (would reduce swelling without glucose spikes), or to take the R frontal mass out. Will d/w family            Romie Angel MD  Neurosurgery  [facility] - Neurosurgery ([facility])

## 2023-11-19 NOTE — ASSESSMENT & PLAN NOTE
Patient's anemia is currently controlled. Has not received any PRBCs to date. Etiology likely d/t chronic blood loss  Current CBC reviewed-   Lab Results   Component Value Date    HGB 7.5 (L) 11/18/2023    HCT 27.7 (L) 11/18/2023       Lab Results   Component Value Date    FERRITIN 446 (H) 11/17/2023     Iron studies reviewed, suspect anemia of chronic disease but has had GIB in the past.   Drop in H/H likely dilution. - no indication for transfusion.

## 2023-11-19 NOTE — HPI
73M PMH metastatic RCC s/p nephrectomy with multiple opulm, sacroiliac, brain mets presenting with increased confusion last two weeks. Patient recently had gamma knife surgery with Dr. Ragland 11/10/23. Was on Dex 4mg BID last month when glucose spiked into 800s, tapered down subsequently. Back on Dex 4q8 right now with 15u NPH Q8h and SSI. Aox1 on examination with higher order confusion, no other deficits. Recommend Dex 4q6 with close glucose monitoring until outpatient clinic follow up

## 2023-11-19 NOTE — ASSESSMENT & PLAN NOTE
A/  Xgeva (Denosumab) at [facility] prior to admission  Improved with IVF  Ca 10.7 (11/14) > 10.3 > 9.8 > 9.6 (11/18)  Ca (corrected) 12.5 (11/14) > 10.3 (11/16) > 9.6(11/18). Ref 8.7-10.5  Ionized Ca 1.34 (11/16)    P/   Zometa 4 mg IV today (rec by oncology)   Oncology follow up next week (outpatient)

## 2023-11-20 ENCOUNTER — TELEPHONE (OUTPATIENT)
Dept: HEMATOLOGY/ONCOLOGY | Facility: CLINIC | Age: 73
End: 2023-11-20
Payer: COMMERCIAL

## 2023-11-20 ENCOUNTER — PATIENT OUTREACH (OUTPATIENT)
Dept: ADMINISTRATIVE | Facility: CLINIC | Age: 73
End: 2023-11-20
Payer: COMMERCIAL

## 2023-11-20 LAB — PATH REV BLD -IMP: NORMAL

## 2023-11-27 NOTE — PROGRESS NOTES
Subjective     Patient ID: Mark Coppola is a 73 y.o. male.    Chief Complaint: Metastatic cancer to brain    HPI    Here for follow up:    Currently reports the following:  Steroid side effects- weight gain, food cravings, BG issues  Currently on Decadron 4 mg 3 x per day  Was told by Dr. Ragland to decrease to 2 mg QD today  Son concerned to drop quickly for adrenal insufficiency concerns    BG running at home tends to be 200s- 300s - reports insulin has been changed from the hospitalization    Noting rash on abdomen    Notes fatigue is worsening  Since the prior admission he is mostly in bed even to eat    He reports no pain  Son notes if he is moved he will have pain- legs  Trouble moving him to get into the car    - 11/18/2023 MRI Brain:  FINDINGS:  Generalized cerebral volume loss with compensatory enlargement ventricles sulci and cisterns similar to prior without evidence for hydrocephalus.  There is continued probable dural-based enhancing lesion along the right frontal lobe inferiorly measuring 0.9 x 1.0 by 1.0 in AP by TV by CC dimensions respectively.  Separate enhancing lesions in the cerebellar hemispheres inferiorly again identified left being significantly larger than the right with straight configuration concerning for involvement of the cerebellar folia left cerebellar lesion measuring 1.9 x 1.6 cm previously with right cerebellar focus measuring 0.6 x 0.5 cm in AP by TV dimensions overall relatively similar to prior.  Continued somewhat bandlike ill-defined enhancement right frontal periventricular white matter measuring 0.6 cm greatest dimension relatively similar.  No definite new enhancing lesion.  There is continued but slightly reduced edema signal abnormality associated with the enhancing lesions most pronounced in the right frontal and left cerebellar foci.  No significant increased mass effect or current midline shift.  There is susceptibility associated with the cerebellar foci suggestive  for associated hemorrhage similar to prior.  Punctate susceptibility left medial temporal lobe and left davian suggestive for remote microhemorrhages unchanged  Impression:  Scattered enhancing lesions brain parenchyma remain concerning for sequela metastatic disease please note foci in the cerebellum extends to the folia as well as probable dural based lesion along the right frontal lobe.  There is no new lesion or significant increased size lesion  Ventricles relatively similar without hydrocephalus  Continued ill-defined enhancement right frontal periventricular white matter  Clinical correlation and continued follow-up advised.  No definite new lesion or is increased size lesion to suggest significant disease progression.  Clinical correlation and continued follow-up advised.      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 He was diagnosed in about 7/16, underwent left nephrectomy and was in remission until about 8/20 when a pleural based nodule appeared. Progression to sacroiliac lytic lesion about 5/21, developed hypoercalcemia and GIB, now on chronic transfusion needs. Recently found to have multiple scattered intracerebral lesions. Started on Dexamethasone and recently underwent gamma knife surgery of a frontal cerebral lesion (Dr. Ragland / Dr. Velazquez).      He is following with Dr. ISIDRO Michael at [facility] as a 2nd opinion for further cancer directed care. He has had most of his care at [facility] (Dr. Corado, Dr. Hinson) until now.      Most recently he was noted to have increased confusion. A UTI was suspected and Ciprofloxacin started empirically. He has known Hypercalcemia of malignancy and had received Xgeva (Denosumab) at [facility] with initial hypocalcemia, now rising Ca 2+ again.      Additionally, profound hyperglycemia had been noted since  initiation of Dexamethasone with BG as high as 800mg/dl prompting dose reduction.      The pt. Has been reluctant to present to the ED for confusion but his wife had insisted and he agree to present today.      Evaluation in the ED shows new thrombocytopenia (98 from 298), [place]. Calcium of 11.8. CT Head with previously identified lesions but no new findings. Given a dose of IV Ciprofloxacin and 1L LR and admitted.      On my visit, the pt. Daughter and wife give most of the history. The pt. Is alert and awake and conversational but has tangential thoughts and goes off topic frequently.      Case discussed with Dr. Morales, family friend, who has helped manage Dr. Coppola's condition outpatient.      Hospital Course      Patient w metastatic RCC folowed by Dr Michael admitted 11/16 for AMS likley mulitfactorial with hypercalcemia (Ca 10.7 corrected 12.5), hyperglycemia 2/2 steroid administration and recent gamma knife for brain metastasis on steroids. Ca improved on IVF . Zometa (4 mg) given as well.. Glucose improved with increase in insulin. Platelets continued to drift down. Mental status improved initially but mild confusion and tangential thinking persisted. MRI brain unchanged from prior. Dexamethasone increased from 2 mg bid to 4 mg tid per request of Neurosurgery with taper anticipated when the patient is seen in follow-up on 11/28.. EEG neg for sz focus. Patient was normotensive during admission on no BP medication.  Patient treated for E coli urinary tract infection with ciprofloxacin x5 days. Endocrinology, Oncology, Hematology, and Neurosurgery consultation services provided guidance during this admission. Close follow up with these services planned. Detailed instructions as to medication and follow up plan provided by me to patient and his wife at the bedside. All questions were  answered    Presents for medical oncology opinion:  Metastatic renal cell cancer  [facility]- [facility]  Currently off therapy- multiple  transfusions with disease related bleed     Primary Medical Oncologist: Dr. Gael Corado  Primary Radiation Oncologist: Dr. Baylee Hinson     Reports the following:  - stereotactic XRT CNS approx 6 months ago and told most recent MRI - Friday MRI  Left cerebellar, right cerebellar, right frontal progression     6 weeks ago stopped working- rhinovirus  Got very ill     Chronic nausea  No headaches  Undergoing PT-- generalized weakness  LE swelling  Weight decreased over last 2-3 years -- eating better with steroid and now having blood glucose elevations (> 200)  Treating hypercalcemia     Prior consults at [facility], [place], [facility]       Oncology History:     -- visiting friends at [facility]  [place] like he would die climbing there and when he returned home and sought out work up  Developed hematuria and hydronephrosis     - 7/1/2016 Left nephrectomy at [facility]  Pathology:  Clear cell RCC, Grade 3, pT3, negative margins     - surveillance imaging     - 10/2/2018 recurrence on surveillance imaging at right paratracheal LN  Tried Sutent x 1-2 months but discontinued with side effects (hand foot symptoms)     - end of 2018- 2/2020: Ipi/Nivo followed by Nivo single agent  Plaquenil  Otezela  Developed lichen planus and discontinued     - 3/19/2020 Bone scan negative     - 5/8/2020 Brain MRI negative     - 5/8/2020 CT C/A/P revealed a 1.5 cm LLL pleural nodule     - 8/19/2020 CT C/A/P revealed the 1.5 cm LLL pleural nodule and a 0.5 cm LLL noduke     - 2/26/2021 CT C/A/P: revealed increased increased pleural and pulmonary nodules     - 5/18/2021 CT C/A/P Sacroiliac lytic lesion, other areas stable     - he reports on Keytruda at same point     - XRT to SI joint     6 weeks ago rhinovirus  -dexamethasone 4 mg bid  Xgeva (touro) to reduce calcium Dr. Sotelo     Last Keytruda dose  Told to discontinue        - GI Bleed  Intervention 9/2022- cauterization   Intermittently requires transfusion - last transfusion was last  Saturday  Can be 1 x per week  Recent scopes - Dr. 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 Negative for chills and fever.   HENT:  Negative for trouble swallowing.    Eyes:  Negative for visual disturbance.   Respiratory:  Negative for cough and shortness of breath.    Cardiovascular:  Positive for leg swelling. Negative for chest pain and palpitations.   Gastrointestinal:  Negative for abdominal distention, abdominal pain, change in bowel habit, constipation and nausea.   Genitourinary:  Negative for decreased urine volume, difficulty urinating and dysuria.   Musculoskeletal:  Negative for arthralgias.   Neurological:  Negative for dizziness, weakness, light-headedness and numbness.   Hematological:  Negative for adenopathy. Does not bruise/bleed easily.   Psychiatric/Behavioral:  Negative for dysphoric mood. The patient is not nervous/anxious.           Objective     Physical Exam  Vitals and nursing note reviewed.   Constitutional:       General: He is not in acute distress.     Appearance: He is ill-appearing.      Comments: Presents with son  ECOG= 2   HENT:      Head: Normocephalic and atraumatic.   Eyes:      General: No scleral icterus.     Extraocular Movements: Extraocular movements intact.      Pupils: Pupils are equal, round, and reactive to light.   Cardiovascular:      Rate and Rhythm: Normal rate and regular rhythm.      Heart sounds: Murmur heard.      No  friction rub. No gallop.   Pulmonary:      Effort: Pulmonary effort is normal. No respiratory distress.      Breath sounds: Normal breath sounds. No wheezing, rhonchi or rales.   Abdominal:      General: Abdomen is flat. Bowel sounds are normal. There is no distension.      Palpations: Abdomen is soft. There is no mass.      Tenderness: There is no abdominal tenderness. There is no rebound.   Musculoskeletal:         General: No swelling. Normal range of motion.      Cervical back: Normal range of motion and neck supple.      Right lower leg: No edema.      Left lower leg: No edema.   Skin:     General: Skin is warm and dry.      Coloration: Skin is pale. Skin is not jaundiced.      Findings: Rash present.   Neurological:      General: No focal deficit present.      Mental Status: He is alert and oriented to person, place, and time.      Sensory: No sensory deficit.      Motor: Weakness (generalized) present.      Gait: Gait abnormal.   Psychiatric:         Mood and Affect: Mood normal.         Behavior: Behavior normal.         Thought Content: Thought content normal.         Judgment: Judgment normal.            Assessment and Plan     1. Metastatic renal cell carcinoma, unspecified laterality    2. Metastatic renal cell carcinoma to brain    3. Type 2 diabetes mellitus without complication, with long-term current use of insulin    4. Declining performance status      Palliative/Hospice recommended  He has not had further bleeding but after Keytruda stopped he has not needed transfusions- would like to receive if needed- will discuss with SW  No further treatment options available and would be limited with performance status and tosicity risk regardless

## 2023-11-28 ENCOUNTER — OFFICE VISIT (OUTPATIENT)
Dept: HEMATOLOGY/ONCOLOGY | Facility: CLINIC | Age: 73
End: 2023-11-28
Payer: COMMERCIAL

## 2023-11-28 ENCOUNTER — OFFICE VISIT (OUTPATIENT)
Dept: NEUROSURGERY | Facility: CLINIC | Age: 73
End: 2023-11-28
Payer: COMMERCIAL

## 2023-11-28 ENCOUNTER — LAB VISIT (OUTPATIENT)
Dept: LAB | Facility: HOSPITAL | Age: 73
End: 2023-11-28
Attending: INTERNAL MEDICINE
Payer: COMMERCIAL

## 2023-11-28 VITALS
DIASTOLIC BLOOD PRESSURE: 74 MMHG | WEIGHT: 166.44 LBS | HEIGHT: 65 IN | SYSTOLIC BLOOD PRESSURE: 139 MMHG | BODY MASS INDEX: 27.73 KG/M2 | HEART RATE: 75 BPM

## 2023-11-28 VITALS
BODY MASS INDEX: 27.73 KG/M2 | WEIGHT: 166.44 LBS | SYSTOLIC BLOOD PRESSURE: 139 MMHG | DIASTOLIC BLOOD PRESSURE: 64 MMHG | HEART RATE: 75 BPM | HEIGHT: 65 IN

## 2023-11-28 DIAGNOSIS — C64.9 METASTATIC RENAL CELL CARCINOMA TO BRAIN: Primary | ICD-10-CM

## 2023-11-28 DIAGNOSIS — C79.31 METASTATIC RENAL CELL CARCINOMA TO BRAIN: Primary | ICD-10-CM

## 2023-11-28 DIAGNOSIS — C64.9 METASTATIC RENAL CELL CARCINOMA TO BRAIN: ICD-10-CM

## 2023-11-28 DIAGNOSIS — C79.31 METASTATIC RENAL CELL CARCINOMA TO BRAIN: ICD-10-CM

## 2023-11-28 DIAGNOSIS — E11.9 TYPE 2 DIABETES MELLITUS WITHOUT COMPLICATION, WITH LONG-TERM CURRENT USE OF INSULIN: ICD-10-CM

## 2023-11-28 DIAGNOSIS — C64.9 METASTATIC RENAL CELL CARCINOMA, UNSPECIFIED LATERALITY: Primary | ICD-10-CM

## 2023-11-28 DIAGNOSIS — Z79.4 TYPE 2 DIABETES MELLITUS WITHOUT COMPLICATION, WITH LONG-TERM CURRENT USE OF INSULIN: ICD-10-CM

## 2023-11-28 DIAGNOSIS — C79.31 METASTATIC CANCER TO BRAIN: ICD-10-CM

## 2023-11-28 DIAGNOSIS — R53.81 DECLINING PERFORMANCE STATUS: ICD-10-CM

## 2023-11-28 LAB
ALBUMIN SERPL BCP-MCNC: 2.1 G/DL (ref 3.5–5.2)
ALP SERPL-CCNC: 171 U/L (ref 55–135)
ALT SERPL W/O P-5'-P-CCNC: 71 U/L (ref 10–44)
ANION GAP SERPL CALC-SCNC: 8 MMOL/L (ref 8–16)
ANISOCYTOSIS BLD QL SMEAR: SLIGHT
AST SERPL-CCNC: 20 U/L (ref 10–40)
BASOPHILS NFR BLD: 0 % (ref 0–1.9)
BILIRUB SERPL-MCNC: 0.6 MG/DL (ref 0.1–1)
BUN SERPL-MCNC: 29 MG/DL (ref 8–23)
CALCIUM SERPL-MCNC: 9.7 MG/DL (ref 8.7–10.5)
CHLORIDE SERPL-SCNC: 103 MMOL/L (ref 95–110)
CO2 SERPL-SCNC: 25 MMOL/L (ref 23–29)
CREAT SERPL-MCNC: 0.8 MG/DL (ref 0.5–1.4)
DACRYOCYTES BLD QL SMEAR: ABNORMAL
DIFFERENTIAL METHOD: ABNORMAL
DOHLE BOD BLD QL SMEAR: PRESENT
EOSINOPHIL NFR BLD: 1 % (ref 0–8)
ERYTHROCYTE [DISTWIDTH] IN BLOOD BY AUTOMATED COUNT: 19.5 % (ref 11.5–14.5)
EST. GFR  (NO RACE VARIABLE): >60 ML/MIN/1.73 M^2
GLUCOSE SERPL-MCNC: 172 MG/DL (ref 70–110)
HCT VFR BLD AUTO: 29.4 % (ref 40–54)
HGB BLD-MCNC: 7.9 G/DL (ref 14–18)
HYPOCHROMIA BLD QL SMEAR: ABNORMAL
IMM GRANULOCYTES # BLD AUTO: ABNORMAL K/UL (ref 0–0.04)
IMM GRANULOCYTES NFR BLD AUTO: ABNORMAL % (ref 0–0.5)
LYMPHOCYTES NFR BLD: 2 % (ref 18–48)
MCH RBC QN AUTO: 24.5 PG (ref 27–31)
MCHC RBC AUTO-ENTMCNC: 26.9 G/DL (ref 32–36)
MCV RBC AUTO: 91 FL (ref 82–98)
METAMYELOCYTES NFR BLD MANUAL: 1 %
MONOCYTES NFR BLD: 4 % (ref 4–15)
MYELOCYTES NFR BLD MANUAL: 2 %
NEUTROPHILS NFR BLD: 90 % (ref 38–73)
NRBC BLD-RTO: 2 /100 WBC
OVALOCYTES BLD QL SMEAR: ABNORMAL
PLATELET # BLD AUTO: 174 K/UL (ref 150–450)
PLATELET BLD QL SMEAR: ABNORMAL
PMV BLD AUTO: 9.3 FL (ref 9.2–12.9)
POIKILOCYTOSIS BLD QL SMEAR: SLIGHT
POLYCHROMASIA BLD QL SMEAR: ABNORMAL
POTASSIUM SERPL-SCNC: 4.8 MMOL/L (ref 3.5–5.1)
PROT SERPL-MCNC: 6 G/DL (ref 6–8.4)
RBC # BLD AUTO: 3.23 M/UL (ref 4.6–6.2)
SODIUM SERPL-SCNC: 136 MMOL/L (ref 136–145)
TARGETS BLD QL SMEAR: ABNORMAL
TOXIC GRANULES BLD QL SMEAR: PRESENT
WBC # BLD AUTO: 10.34 K/UL (ref 3.9–12.7)

## 2023-11-28 PROCEDURE — 1160F PR REVIEW ALL MEDS BY PRESCRIBER/CLIN PHARMACIST DOCUMENTED: ICD-10-PCS | Mod: CPTII,S$GLB,, | Performed by: INTERNAL MEDICINE

## 2023-11-28 PROCEDURE — 3075F SYST BP GE 130 - 139MM HG: CPT | Mod: CPTII,S$GLB,, | Performed by: NEUROLOGICAL SURGERY

## 2023-11-28 PROCEDURE — 99999 PR PBB SHADOW E&M-EST. PATIENT-LVL III: ICD-10-PCS | Mod: PBBFAC,,, | Performed by: INTERNAL MEDICINE

## 2023-11-28 PROCEDURE — 36415 COLL VENOUS BLD VENIPUNCTURE: CPT | Performed by: INTERNAL MEDICINE

## 2023-11-28 PROCEDURE — 3288F PR FALLS RISK ASSESSMENT DOCUMENTED: ICD-10-PCS | Mod: CPTII,S$GLB,, | Performed by: NEUROLOGICAL SURGERY

## 2023-11-28 PROCEDURE — 3078F PR MOST RECENT DIASTOLIC BLOOD PRESSURE < 80 MM HG: ICD-10-PCS | Mod: CPTII,S$GLB,, | Performed by: INTERNAL MEDICINE

## 2023-11-28 PROCEDURE — 99214 PR OFFICE/OUTPT VISIT, EST, LEVL IV, 30-39 MIN: ICD-10-PCS | Mod: S$GLB,,, | Performed by: INTERNAL MEDICINE

## 2023-11-28 PROCEDURE — 1159F MED LIST DOCD IN RCRD: CPT | Mod: CPTII,S$GLB,, | Performed by: INTERNAL MEDICINE

## 2023-11-28 PROCEDURE — 3046F PR MOST RECENT HEMOGLOBIN A1C LEVEL > 9.0%: ICD-10-PCS | Mod: CPTII,S$GLB,, | Performed by: INTERNAL MEDICINE

## 2023-11-28 PROCEDURE — 99999 PR PBB SHADOW E&M-EST. PATIENT-LVL III: CPT | Mod: PBBFAC,,, | Performed by: INTERNAL MEDICINE

## 2023-11-28 PROCEDURE — 99999 PR PBB SHADOW E&M-EST. PATIENT-LVL III: CPT | Mod: PBBFAC,,, | Performed by: NEUROLOGICAL SURGERY

## 2023-11-28 PROCEDURE — 85027 COMPLETE CBC AUTOMATED: CPT | Performed by: INTERNAL MEDICINE

## 2023-11-28 PROCEDURE — 80053 COMPREHEN METABOLIC PANEL: CPT | Performed by: INTERNAL MEDICINE

## 2023-11-28 PROCEDURE — 1159F MED LIST DOCD IN RCRD: CPT | Mod: CPTII,S$GLB,, | Performed by: NEUROLOGICAL SURGERY

## 2023-11-28 PROCEDURE — 1159F PR MEDICATION LIST DOCUMENTED IN MEDICAL RECORD: ICD-10-PCS | Mod: CPTII,S$GLB,, | Performed by: INTERNAL MEDICINE

## 2023-11-28 PROCEDURE — 1160F RVW MEDS BY RX/DR IN RCRD: CPT | Mod: CPTII,S$GLB,, | Performed by: INTERNAL MEDICINE

## 2023-11-28 PROCEDURE — 1160F RVW MEDS BY RX/DR IN RCRD: CPT | Mod: CPTII,S$GLB,, | Performed by: NEUROLOGICAL SURGERY

## 2023-11-28 PROCEDURE — 3075F PR MOST RECENT SYSTOLIC BLOOD PRESS GE 130-139MM HG: ICD-10-PCS | Mod: CPTII,S$GLB,, | Performed by: INTERNAL MEDICINE

## 2023-11-28 PROCEDURE — 3075F PR MOST RECENT SYSTOLIC BLOOD PRESS GE 130-139MM HG: ICD-10-PCS | Mod: CPTII,S$GLB,, | Performed by: NEUROLOGICAL SURGERY

## 2023-11-28 PROCEDURE — 3046F PR MOST RECENT HEMOGLOBIN A1C LEVEL > 9.0%: ICD-10-PCS | Mod: CPTII,S$GLB,, | Performed by: NEUROLOGICAL SURGERY

## 2023-11-28 PROCEDURE — 3078F DIAST BP <80 MM HG: CPT | Mod: CPTII,S$GLB,, | Performed by: INTERNAL MEDICINE

## 2023-11-28 PROCEDURE — 99024 PR POST-OP FOLLOW-UP VISIT: ICD-10-PCS | Mod: S$GLB,,, | Performed by: NEUROLOGICAL SURGERY

## 2023-11-28 PROCEDURE — 1101F PT FALLS ASSESS-DOCD LE1/YR: CPT | Mod: CPTII,S$GLB,, | Performed by: NEUROLOGICAL SURGERY

## 2023-11-28 PROCEDURE — 3075F SYST BP GE 130 - 139MM HG: CPT | Mod: CPTII,S$GLB,, | Performed by: INTERNAL MEDICINE

## 2023-11-28 PROCEDURE — 1126F PR PAIN SEVERITY QUANTIFIED, NO PAIN PRESENT: ICD-10-PCS | Mod: CPTII,S$GLB,, | Performed by: NEUROLOGICAL SURGERY

## 2023-11-28 PROCEDURE — 3046F HEMOGLOBIN A1C LEVEL >9.0%: CPT | Mod: CPTII,S$GLB,, | Performed by: NEUROLOGICAL SURGERY

## 2023-11-28 PROCEDURE — 3288F FALL RISK ASSESSMENT DOCD: CPT | Mod: CPTII,S$GLB,, | Performed by: NEUROLOGICAL SURGERY

## 2023-11-28 PROCEDURE — 1159F PR MEDICATION LIST DOCUMENTED IN MEDICAL RECORD: ICD-10-PCS | Mod: CPTII,S$GLB,, | Performed by: NEUROLOGICAL SURGERY

## 2023-11-28 PROCEDURE — 3078F DIAST BP <80 MM HG: CPT | Mod: CPTII,S$GLB,, | Performed by: NEUROLOGICAL SURGERY

## 2023-11-28 PROCEDURE — 1101F PR PT FALLS ASSESS DOC 0-1 FALLS W/OUT INJ PAST YR: ICD-10-PCS | Mod: CPTII,S$GLB,, | Performed by: NEUROLOGICAL SURGERY

## 2023-11-28 PROCEDURE — 3046F HEMOGLOBIN A1C LEVEL >9.0%: CPT | Mod: CPTII,S$GLB,, | Performed by: INTERNAL MEDICINE

## 2023-11-28 PROCEDURE — 1160F PR REVIEW ALL MEDS BY PRESCRIBER/CLIN PHARMACIST DOCUMENTED: ICD-10-PCS | Mod: CPTII,S$GLB,, | Performed by: NEUROLOGICAL SURGERY

## 2023-11-28 PROCEDURE — 1126F AMNT PAIN NOTED NONE PRSNT: CPT | Mod: CPTII,S$GLB,, | Performed by: NEUROLOGICAL SURGERY

## 2023-11-28 PROCEDURE — 99999 PR PBB SHADOW E&M-EST. PATIENT-LVL III: ICD-10-PCS | Mod: PBBFAC,,, | Performed by: NEUROLOGICAL SURGERY

## 2023-11-28 PROCEDURE — 99214 OFFICE O/P EST MOD 30 MIN: CPT | Mod: S$GLB,,, | Performed by: INTERNAL MEDICINE

## 2023-11-28 PROCEDURE — 99024 POSTOP FOLLOW-UP VISIT: CPT | Mod: S$GLB,,, | Performed by: NEUROLOGICAL SURGERY

## 2023-11-28 PROCEDURE — 85007 BL SMEAR W/DIFF WBC COUNT: CPT | Performed by: INTERNAL MEDICINE

## 2023-11-28 PROCEDURE — 3078F PR MOST RECENT DIASTOLIC BLOOD PRESSURE < 80 MM HG: ICD-10-PCS | Mod: CPTII,S$GLB,, | Performed by: NEUROLOGICAL SURGERY

## 2023-11-28 RX ORDER — DEXAMETHASONE 2 MG/1
2 TABLET ORAL DAILY
Qty: 10 TABLET | Refills: 0 | Status: SHIPPED | OUTPATIENT
Start: 2023-11-28 | End: 2023-12-08

## 2023-11-28 NOTE — PROGRESS NOTES
Subjective:   I, Josie Mejia, attest that this documentation has been prepared under the direction and in the presence of Bartolo Ragland MD.     Patient ID: Mark Coppola is a 73 y.o. male     Chief Complaint: No chief complaint on file.      HPI  Mr. Mark Coppola is a 73 y.o. gentleman with Stage IV RCC s/p Left nephrectomy 7/2016 with development of metastatic disease in 2018 s/p GSRS (24 Gy to the 52% isodose line) done on 11/10/2023. Pt presents today for follow up. Pt has had multiple systemic therapies and courses of radiation including SRS to 4 metastases (2 Right cerebellar, 1 Left cerebellar, 1 Right anterior frontal periventricular) 7/23/23 at [facility] with Dr. Baylee Hinson. Most recent MRI Brain 9/29/23 with 3 enhancing lesions, 2 which were previously treated. There is interval development of the Right anterior frontal dural based enhancing lesion, consistent with new metastasis.  He is referred to me for treatment planning of his new lesion. Denies headaches, changes in vision, or any other neurologic deficits at this time.  Since his previous treatment, he has developed a solitary new right frontal metastasis.  I have recommend GK SRS for brain mets and patient wish to proceed.    Today the pt is accompanied by his family. They report acute bilateral lower extremity edema (R>L) onset 2-3 days ago. Associated symptoms include pallor to the BLE. His family has also noted acute onset of rash to his abdomen, likely attributed to his change in medication. Since starting steroids, the pt has had a significant increase in appetite. He is currently tapering of 4 mg decadron TID.     Review of Systems   Constitutional:  Negative for activity change, appetite change, fatigue, fever and unexpected weight change.   HENT:  Negative for facial swelling.    Eyes: Negative.    Respiratory: Negative.     Cardiovascular:  Positive for leg swelling (R>L).   Gastrointestinal:  Negative for diarrhea, nausea and  vomiting.   Endocrine: Positive for polyphagia.   Genitourinary: Negative.    Musculoskeletal:  Negative for back pain, joint swelling, myalgias and neck pain.   Skin:  Positive for pallor.   Neurological:  Negative for dizziness, seizures, weakness, numbness and headaches.   Psychiatric/Behavioral: Negative.          History reviewed. No pertinent past medical history.    Objective:      Vitals:    11/28/23 1328   BP: 139/64   Pulse: 75      Physical Exam  Constitutional:       General: He is not in acute distress.     Appearance: Normal appearance.   HENT:      Head: Normocephalic and atraumatic.   Pulmonary:      Effort: Pulmonary effort is normal.   Musculoskeletal:      Cervical back: Neck supple.      Right lower leg: Pitting Edema present.      Left lower leg: Pitting Edema present.      Comments: BLE swelling (R>L)   Neurological:      Mental Status: He is alert and oriented to person, place, and time.      GCS: GCS eye subscore is 4. GCS verbal subscore is 5. GCS motor subscore is 6.      Cranial Nerves: No cranial nerve deficit.         IMAGING:  MRI Brain W WO Contrast (11/18/2023):  Scattered enhancing lesions brain parenchyma remain concerning for sequela metastatic disease please note foci in the cerebellum extends to the folia as well as probable dural based lesion along the right frontal lobe.  There is no new lesion or significant increased size lesion. Ventricles relatively similar without hydrocephalus. Continued ill-defined enhancement right frontal periventricular white matter. No definite new lesion or is increased size lesion to suggest significant disease progression.    I have personally reviewed the images with the pt.      I, Dr. Bartolo Ragland, personally performed the services described in this documentation. All medical record entries made by the scribe, Josie Mejia, were at my direction and in my presence.  I have reviewed the chart and agree that the record reflects my personal  performance and is accurate and complete. Bartolo Ragland MD. 11/28/2023    Assessment:       Renal cell carcinoma.  Brain metastasis.      Plan:   I have personally reviewed the MRI brain with the pt which shows scattered enhancing lesions brain parenchyma remain concerning for sequela metastatic disease please note foci in the cerebellum extends to the folia as well as probable dural based lesion along the right frontal lobe.  There is no new lesion or significant increased size lesion. Ventricles relatively similar without hydrocephalus. Continued ill-defined enhancement right frontal periventricular white matter. No definite new lesion or is increased size lesion to suggest significant disease progression.    Decrease steroids to 2 mg dexamethasone qAM. I suggested the pt elevate his legs at night or utilize compression stockings. I will schedule the patient for 2 month follow up with MRI brain.    Plan to work up the pt's NPH after all metastatic workup has been completed.

## 2023-11-28 NOTE — PATIENT INSTRUCTIONS
I have personally reviewed the MRI brain with the pt which shows scattered enhancing lesions brain parenchyma remain concerning for sequela metastatic disease please note foci in the cerebellum extends to the folia as well as probable dural based lesion along the right frontal lobe.  There is no new lesion or significant increased size lesion. Ventricles relatively similar without hydrocephalus. Continued ill-defined enhancement right frontal periventricular white matter. No definite new lesion or is increased size lesion to suggest significant disease progression.    Decrease steroids to 2 mg dexamethasone qAM. I suggested the pt elevate his legs at night or utilize compression stockings. I will schedule the patient for 2 month follow up with MRI brain.    Plan to work up the pt's NPH after all metastatic workup has been completed.

## 2023-11-30 DIAGNOSIS — C79.31 METASTATIC RENAL CELL CARCINOMA TO BRAIN: ICD-10-CM

## 2023-11-30 DIAGNOSIS — C64.9 METASTATIC RENAL CELL CARCINOMA, UNSPECIFIED LATERALITY: Primary | ICD-10-CM

## 2023-11-30 DIAGNOSIS — R53.81 DECLINING PERFORMANCE STATUS: ICD-10-CM

## 2023-11-30 DIAGNOSIS — C64.9 METASTATIC RENAL CELL CARCINOMA TO BRAIN: ICD-10-CM

## 2023-12-07 ENCOUNTER — DOCUMENT SCAN (OUTPATIENT)
Dept: HOME HEALTH SERVICES | Facility: HOSPITAL | Age: 73
End: 2023-12-07
Payer: COMMERCIAL

## 2023-12-10 ENCOUNTER — DOCUMENT SCAN (OUTPATIENT)
Dept: HOME HEALTH SERVICES | Facility: HOSPITAL | Age: 73
End: 2023-12-10
Payer: COMMERCIAL

## 2023-12-24 ENCOUNTER — DOCUMENT SCAN (OUTPATIENT)
Dept: HOME HEALTH SERVICES | Facility: HOSPITAL | Age: 73
End: 2023-12-24
Payer: COMMERCIAL

## 2024-03-14 ENCOUNTER — EXTERNAL HOME HEALTH (OUTPATIENT)
Dept: HOME HEALTH SERVICES | Facility: HOSPITAL | Age: 74
End: 2024-03-14
Payer: COMMERCIAL

## 2024-10-02 ENCOUNTER — PATIENT MESSAGE (OUTPATIENT)
Dept: RESEARCH | Facility: CLINIC | Age: 74
End: 2024-10-02
Payer: COMMERCIAL